# Patient Record
Sex: MALE | Race: WHITE | NOT HISPANIC OR LATINO | Employment: OTHER | URBAN - METROPOLITAN AREA
[De-identification: names, ages, dates, MRNs, and addresses within clinical notes are randomized per-mention and may not be internally consistent; named-entity substitution may affect disease eponyms.]

---

## 2017-01-12 ENCOUNTER — GENERIC CONVERSION - ENCOUNTER (OUTPATIENT)
Dept: OTHER | Facility: OTHER | Age: 72
End: 2017-01-12

## 2017-02-02 ENCOUNTER — GENERIC CONVERSION - ENCOUNTER (OUTPATIENT)
Dept: OTHER | Facility: OTHER | Age: 72
End: 2017-02-02

## 2017-03-23 ENCOUNTER — GENERIC CONVERSION - ENCOUNTER (OUTPATIENT)
Dept: OTHER | Facility: OTHER | Age: 72
End: 2017-03-23

## 2017-04-13 ENCOUNTER — GENERIC CONVERSION - ENCOUNTER (OUTPATIENT)
Dept: OTHER | Facility: OTHER | Age: 72
End: 2017-04-13

## 2017-05-04 ENCOUNTER — GENERIC CONVERSION - ENCOUNTER (OUTPATIENT)
Dept: OTHER | Facility: OTHER | Age: 72
End: 2017-05-04

## 2017-05-08 ENCOUNTER — GENERIC CONVERSION - ENCOUNTER (OUTPATIENT)
Dept: OTHER | Facility: OTHER | Age: 72
End: 2017-05-08

## 2017-06-06 ENCOUNTER — ALLSCRIPTS OFFICE VISIT (OUTPATIENT)
Dept: OTHER | Facility: OTHER | Age: 72
End: 2017-06-06

## 2017-07-12 ENCOUNTER — GENERIC CONVERSION - ENCOUNTER (OUTPATIENT)
Dept: OTHER | Facility: OTHER | Age: 72
End: 2017-07-12

## 2017-07-12 LAB — HM COLONOSCOPY: NORMAL

## 2017-08-03 ENCOUNTER — GENERIC CONVERSION - ENCOUNTER (OUTPATIENT)
Dept: OTHER | Facility: OTHER | Age: 72
End: 2017-08-03

## 2017-09-06 ENCOUNTER — GENERIC CONVERSION - ENCOUNTER (OUTPATIENT)
Dept: OTHER | Facility: OTHER | Age: 72
End: 2017-09-06

## 2017-11-24 ENCOUNTER — GENERIC CONVERSION - ENCOUNTER (OUTPATIENT)
Dept: OTHER | Facility: OTHER | Age: 72
End: 2017-11-24

## 2017-12-04 ENCOUNTER — ALLSCRIPTS OFFICE VISIT (OUTPATIENT)
Dept: OTHER | Facility: OTHER | Age: 72
End: 2017-12-04

## 2017-12-11 NOTE — PROGRESS NOTES
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)   3  Prostate cancer (185) (C61)   4  Acute respiratory infection (519 8) (J22)   5  Erectile dysfunction of non-organic origin (302 72) (F52 21)   6  Flu vaccine need (V04 81) (Z23)    Plan  Acute respiratory infection    · Amoxicillin 500 MG Oral Capsule; Take 2 capsules twice a day  Erectile dysfunction of non-organic origin    · Viagra 100 MG Oral Tablet; Take 1/2 or one tablet an hour before activity  Flu vaccine need    · Fluzone High-Dose 0 5 ML Intramuscular Suspension Prefilled Syringe    Discussion/Summary  Possible side effects of new medications were reviewed with the patient/guardian today  The treatment plan was reviewed with the patient/guardian  The patient/guardian understands and agrees with the treatment plan     Self Referrals: No      Chief Complaint  Patient is here today for follow up of chronic conditions described in HPI  History of Present Illness  6 month followupsore throat which went into the head since 11/21/17  No chest congestioncolonoscopy in June and doing well with diet change and on Linzess and Miralax      Review of Systems   Constitutional: not feeling tired  Cardiovascular: no chest pain-- and-- no palpitations  Gastrointestinal: no abdominal pain,-- no nausea-- and-- no diarrhea  Genitourinary: no dysuria  Musculoskeletal: no arthralgias  Psychiatric: anxiety-- and-- depression  Active Problems  1  Posadas esophagus (530 85) (K22 70)   2  Blood pressure elevated without history of HTN (796 2) (R03 0)   3  Constipation (564 00) (K59 00)   4  Degeneration, intervertebral disc, thoracic (722 51) (M51 34)   5  Erectile dysfunction of non-organic origin (302 72) (F52 21)   6  Esophagitis, reflux (530 11) (K21 0)   7  Hyperlipidemia (272 4) (E78 5)   8  Lumbar radiculopathy (724 4) (M54 16)   9   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)   10  Osteopenia (733 90) (M85 80)   11  Phlebectasis (228 01)   12  Prostate cancer (185) (C61)   13  Urinary frequency (788 41) (R35 0)   14  Venous stasis dermatitis of left lower extremity (454 1) (I87 2)    Past Medical History  1  Denied: History of Alcohol abuse   2  Denied: History of substance abuse    The active problems and past medical history were reviewed and updated today  Family History  Mother    1  Family history of Hypertension (V17 49)  Family History    2  Denied: Family history of Alcohol abuse   3  Denied: Family history of substance abuse   4  Denied: Family history of Mental health problem    The family history was reviewed and updated today  Social History     · Denied: History of Always uses seat belt   · Former smoker (V15 82) (I51 875)   · Marital History - Single   · Never Drank Alcohol  The social history was reviewed and updated today  The social history was reviewed and is unchanged  Current Meds   1  Casodex 50 MG Oral Tablet; TAKE 1 TABLET DAILY AT DINNER; Therapy: 81Cxs0967 to Recorded   2  Famotidine 20 MG Oral Tablet; take 1 tablet by mouth once daily for ACID REFLUX; Therapy: 28MDM5262 to (Evaluate:06Mar2017) Recorded   3  FLUoxetine HCl - 20 MG Oral Capsule; TAKE (1) CAPSULE DAILY; Therapy: 27Nov2012 to Recorded   4  Imipramine HCl - 50 MG Oral Tablet; TAKE 1 TABLET DAILY AS DIRECTED; Therapy: 27Nov2012 to Recorded   5  Perphenazine 4 MG Oral Tablet; TAKE 1 TABLET AT BEDTIME; Therapy: 41Dyr5645 to Recorded   6  TraZODone HCl - 150 MG Oral Tablet; TAKE 1 TABLET AT BEDTIME; Therapy: 71HII9690 to (Evaluate:47Iuh1123) Recorded   7  Viagra 100 MG Oral Tablet; TAKE AS DIRECTED; Therapy: 96HNJ7658 to (Last Rx:49Mny9181)  Requested for: 37Cew9376 Ordered   8  Zolpidem Tartrate 10 MG Oral Tablet; TAKE 1/2 TO 1 TABLET AT BEDTIME AS NEEDED FOR SLEEP;  Therapy: 62CFL4908 to (Last Rx:27Nov2012) Ordered    The medication list was reviewed and updated today  Allergies  1  No Known Drug Allergies    Vitals  Vital Signs    Recorded: 62KNU6394 10:22AM   Temperature 98 5 F, Tympanic   Heart Rate 100, L Radial   Pulse Quality Regular, L Radial   Systolic 627, LUE, Sitting   Diastolic 76, LUE, Sitting   Height 5 ft 9 in   Weight 154 lb 9 6 oz   BMI Calculated 22 83   BSA Calculated 1 85       Physical Exam   Constitutional  General appearance: No acute distress, well appearing and well nourished  Ears, Nose, Mouth, and Throat  Otoscopic examination: Tympanic membrance translucent with normal light reflex  Canals patent without erythema  Nasal mucosa, septum, and turbinates: Abnormal  -- congested nares  Oropharynx: Normal with no erythema, edema, exudate or lesions  Pulmonary  Respiratory effort: No increased work of breathing or signs of respiratory distress  Auscultation of lungs: Abnormal  -- scattered rhonchi  Cardiovascular  Auscultation of heart: Normal rate and rhythm, normal S1 and S2, without murmurs  Examination of extremities for edema and/or varicosities: Normal    Carotid pulses: Normal    Musculoskeletal  Gait and station: Normal    Skin  Skin and subcutaneous tissue: Normal without rashes or lesions     Psychiatric  Orientation to person, place and time: Normal    Mood and affect: Normal          Future Appointments    Date/Time Provider Specialty Site   06/04/2018 09:40 AM Jarod Somers  [facility] MD       Signatures   Electronically signed by : Jeremiah Dockery MD; Dec 10 2017  1:38PM EST                       (Author)

## 2018-01-11 NOTE — PROGRESS NOTES
Assessment    1  Encounter for preventive health examination (V70 0) (Z00 00)   2  Hyperlipidemia (272 4) (E78 5)   3  Elevated prostate specific antigen (PSA) (790 93) (R97 2)   4  Osteopenia (733 90) (M85 80)   5  Venous stasis dermatitis of left lower extremity (454 1) (I83 12)   6  Elevated blood pressure reading without diagnosis of hypertension (796 2) (R03 0)    Plan  Health Maintenance    · *VB - Fall Risk Assessment  (Dx V80 09 Screen for Neurologic Disorder);  Status:Complete;   Done: 86JNP3535   · *VB-Urinary Incontinence Screen (Dx V81 6 Screen for UI); Status:Complete;   Done:  66AEQ6351   · Eat a low fat and low cholesterol diet ; Status:Complete;   Done: 81QSI6226   · There ways to avoid falling ; Status:Complete;   Done: 02OAL9373   · These are things you can do to prevent falls in and around the home ; Status:Complete;    Done: 34EIQ9187  Hyperlipidemia    · (1) CBC/PLT/DIFF; Status:Hold For - Exact Date; Requested for:Approx 71ZKE8767;    · (1) COMPREHENSIVE METABOLIC PANEL; Status:Hold For - Exact Date; Requested  for:Approx 67IXF3223;    · (1) LIPID PANEL, FASTING; Status:Hold For - Exact Date; Requested for:Approx  35YXV5737;    · (1) TSH WITH FT4 REFLEX; Status:Hold For - Exact Date; Requested for:Approx  30LJF6775;   Osteopenia    · (1) VITAMIN D 25-HYDROXY; Status:Hold For - Exact Date; Requested for:Approx  43KTK0565;   Venous stasis dermatitis of left lower extremity    · Follow-up visit in 3 months Evaluation and Treatment  Follow-up  Status: Complete   Done: 71ERA7280    Discussion/Summary  Impression: Subsequent Annual Wellness Visit, with preventive exam as well as age and risk appropriate counseling completed  Cardiovascular screening and counseling: screening is current  Diabetes screening and counseling: screening is current  Colorectal cancer screening and counseling: screening is current  Prostate cancer screening and counseling: screening is current     Osteoporosis screening and counseling: screening is current  Abdominal aortic aneurysm screening and counseling: screening not indicated  Glaucoma screening and counseling: screening is current  HIV screening and counseling: screening not indicated  Immunizations: influenza vaccine is due today, the lifetime pneumococcal vaccine has been completed, hepatitis B vaccination series is not indicated at this time due to the patient's low risk of sofia the disease, Zostavax vaccination up to date, the patient declines the Td vaccine and the patient declines the Tdap vaccine  Advance Directive Planning: not complete, paperwork and instructions were given to the patient, he was encouraged to follow-up with me to discuss his questions and/or decisions  Advice and education were given regarding fall risk reduction  He was referred to none  Medical Equipment/Suppliers: none  Patient Discussion: plan discussed with the patient, follow-up visit needed in 6 months  Chief Complaint  HM      History of Present Illness  HPI: Seeing Dr Jones Kennedy for abnormal prostate and had biopsy end of May  To see him today for followup  Follows with [facility] 3 times a month  Has open area on calf that he was scratching at  Welcome to [employer] and Wellness Visits: The patient is being seen for the subsequent annual wellness visit  Medicare Screening and Risk Factors   Hospitalizations: no previous hospitalizations  Once per lifetime medicare screening tests: ECG has not been done and AAA screening US has not yet been done  Medicare Screening Tests Risk Questions   Abdominal aortic aneurysm risk assessment: none indicated  Osteoporosis risk assessment: , over 48years of age and past medical history of fracture(s), but none indicated  HIV risk assessment: none indicated  Drug and Alcohol Use:  The patient is a former cigarette smoker, quit smoking 1970's and has never used smokeless tobacco  The patient reports never drinking alcohol  He has never used illicit drugs  Diet and Physical Activity: Current diet includes well balanced meals, 2 servings of fruit per day, 1 servings of vegetables per day, 1 servings of meat per day, 1 servings of whole grains per day, 3 servings of dairy products per day and 3 cups of tea per day  He exercises infrequently  Exercise: walking  Mood Disorder and Cognitive Impairment Screening: PHQ-9 Depression Scale   Depression screening  no significant symptoms  He reports feeling down, depressed, or hopeless over the past two weeks  He reports feeling little interest or pleasure in doing things over the past two weeks  Further Evaluation: Patients mother recently passed away  Cognitive impairment screening: denies difficulty learning/retaining new information, denies difficulty handling complex tasks, denies difficulty with reasoning, denies difficulty with spatial ability and orientation, denies difficulty with language and denies difficulty with behavior  Functional Ability/Level of Safety: Hearing is normal bilaterally, normal in the right ear, normal in the left ear and a hearing aid is not used  The patient is currently able to do activities of daily living with limitations, unable to participate in social activities and able to drive with limitations, but able to do instrumental activities of daily living without limitations  Activities of daily living details: does not need help using the phone, no transportation help needed, does not need help shopping, no meal preparation help needed, does not need help doing housework, does not need help doing laundry, does not need help managing medications and does not need help managing money  Fall risk factors:  antidepressant use and follows with [facility], but no deconditioning and no postural hypotension  Home safety risk factors:  household clutter, but no unfamiliar surroundings, no loose rugs and no poor household lighting  Advance Directives: Advance directives: no living will, no durable power of  for health care directives and no advance directives  end of life decisions were not reviewed with the patient  Co-Managers and Medical Equipment/Suppliers: See Patient Care Team   Reviewed Updated [facility]:   Last Medicare Wellness Visit Information was reviewed, patient interviewed and updates made to the record today  Preventive Quality Program 65 and Older: Falls Risk: The patient fell 0 times in the past 12 months  Associated symptoms:  No associated symptoms are reported  The patient currently has no urinary incontinence symptoms  Patient Care Team    Care Team Member Role Specialty Office Number   Lawsonlucas Humberto DIXON  Family Medicine [phone]   Becky Ferrara MD  Urology [phone]     Review of Systems    Constitutional: no malaise  ENT: sore throat, nasal congestion and nasal discharge  Cardiovascular: no chest pain  Respiratory: no cough and no dry cough  Over the past 2 weeks, how often have you been bothered by the following problems? 1 ) Little interest or pleasure in doing things? Half the days or more  2 ) Feeling down, depressed or hopeless? Half the days or more  3 ) Trouble falling asleep or sleeping too much? Not at all    4 ) Feeling tired or having little energy? Not at all    5 ) Poor appetite or overeating? Not at all    6 ) Feeling bad about yourself, or that you are a failure, or have let yourself or your family down? Several days  7 ) Trouble concentrating on things, such as reading a newspaper or watching television? Half the days or more  8 ) Moving or speaking so slowly that other people could have noticed, or the opposite, moving or speaking faster than usual? Not at all  How difficult have these problems made it for you to do your work, take care of things at home, or get along with people? Somewhat difficult  Score 7      Active Problems    1  Ankle edema (782 3) (M25 473)   2  Posadas esophagus (530 85) (K22 70)   3  Constipation (564 00) (K59 00)   4  Degeneration, intervertebral disc, thoracic (722 51) (M51 34)   5  Elevated blood pressure reading without diagnosis of hypertension (796 2) (R03 0)   6  Elevated prostate specific antigen (PSA) (790 93) (R97 2)   7  Encounter for screening colonoscopy (V76 51) (Z12 11)   8  Erectile dysfunction of non-organic origin (302 72) (F52 21)   9  Esophagitis, reflux (530 11) (K21 0)   10  Flu vaccine need (V04 81) (Z23)   11  Hyperlipidemia (272 4) (E78 5)   12  Lumbar radiculopathy (724 4) (M54 16)   13  Need for vaccination with 13-polyvalent pneumococcal conjugate vaccine (V03 82) (Z23)   14  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)   15  Osteopenia (733 90) (M85 80)   16  Phlebectasis (228 01)   17  Special screening examination for neoplasm of prostate (V76 44) (Z12 5)    Family History  Mother    · Family history of Hypertension (V17 49)    The family history was reviewed and updated today  Social History    · Marital History - Single   · Never A Smoker   · Never Drank Alcohol   · Non-smoker (V49 89) (Z78 9)  The social history was reviewed and updated today  The social history was reviewed and is unchanged  Current Meds   1  Alendronate Sodium 70 MG Oral Tablet; TAKE 1 TABLET ONCE A WEEK (THE SAME   DAY OF WEEK) ON EMPTY STOMACH-BIGGWEQI40 -60MIN BEFORE LYING DOWN;   Therapy: 41SYS3874 to (Evaluate:97Siy6232)  Requested for: 22BSO3318; Last   Rx:30Nov2015 Ordered   2  FLUoxetine HCl - 20 MG Oral Capsule; TAKE (1) CAPSULE DAILY; Therapy: 27Nov2012 to Recorded   3  Imipramine HCl - 50 MG Oral Tablet; TAKE 1 TABLET DAILY AS DIRECTED; Therapy: 27Nov2012 to Recorded   4  Perphenazine 4 MG Oral Tablet; TAKE 1 TABLET AT BEDTIME; Therapy: 48Tgx2209 to Recorded   5  TraZODone HCl - 150 MG Oral Tablet; TAKE 1 TABLET AT BEDTIME;    Therapy: 94KTO8749 to (Evaluate:84Uwg1863) Recorded 6  Viagra 100 MG Oral Tablet; TAKE AS DIRECTED; Therapy: 89IIE1170 to (Last Rx:02Dec2013)  Requested for: 92Xxq1790 Ordered   7  Zolpidem Tartrate 10 MG Oral Tablet; TAKE 1/2 TO 1 TABLET AT BEDTIME AS NEEDED   FOR SLEEP; Therapy: 32SCK9420 to (Last Rx:27Nov2012) Ordered    The medication list was reviewed and updated today  Allergies    1  No Known Drug Allergies    Immunizations   1 2    Fluzone High-Dose 0 5 ML Intramuscular Suspension Prefilled Syringe  66QMT9427     Influenza  00VEF5540 34LBQ5673    PNEUMO (POLY)  04HSN3951     Prevnar 13 Intramuscular Suspension  43NGD7970     Zoster  60OPO5590      Vitals  Signs [Data Includes: Current Encounter]    Systolic: 055  Diastolic: 88   Temperature: 97 5 F, Tympanic  Heart Rate: 72, L Radial  Pulse Quality: Regular  Systolic: 524, LUE, Sitting  Diastolic: 80, LUE, Sitting  Height: 5 ft 9 in  Weight: 157 lb 6 4 oz  BMI Calculated: 23 24  BSA Calculated: 1 86    Physical Exam    Constitutional   General appearance: No acute distress, well appearing and well nourished  Ears, Nose, Mouth, and Throat   External inspection of ears and nose: Normal     Otoscopic examination: Tympanic membranes translucent with normal light reflex  Canals patent without erythema  Hearing: Normal     Nasal mucosa, septum, and turbinates: Normal without edema or erythema  Neck   Neck: Supple, symmetric, trachea midline, no masses  Thyroid: Normal, no thyromegaly  Pulmonary   Respiratory effort: No increased work of breathing or signs of respiratory distress  Auscultation of lungs: Clear to auscultation  Cardiovascular   Auscultation of heart: Normal rate and rhythm, normal S1 and S2, no murmurs  Peripheral vascular exam: Normal     Examination of extremities for edema and/or varicosities: Normal     Lymphatic   Palpation of lymph nodes in neck: No lymphadenopathy  Skin   Skin and subcutaneous tissue: Normal without rashes or lesions      Psychiatric Judgment and insight: Normal     Orientation to person, place and time: Normal     Recent and remote memory: Intact  Mood and affect: Normal        Procedure    Procedure: Visual Acuity Test    Indication: routine screening  Inforrmation supplied by a Snellen chart     Results: 20/30 in the right eye without corrective device, 20/40 in the left eye without corrective device      Future Appointments    Date/Time Provider Specialty Site   09/07/2016 09:40 AM Lu Matos MD Family Medicine Ruben Wild MD   11/30/2016 09:00 AM Lu Matos MD [address] [place] [address]     Signatures   Electronically signed by : Edmund Erwin MD; Jun 23 2016  8:41PM EST                       (Author)

## 2018-01-13 VITALS
TEMPERATURE: 99 F | DIASTOLIC BLOOD PRESSURE: 82 MMHG | HEART RATE: 100 BPM | WEIGHT: 155 LBS | BODY MASS INDEX: 22.96 KG/M2 | HEIGHT: 69 IN | SYSTOLIC BLOOD PRESSURE: 164 MMHG

## 2018-01-16 NOTE — PROGRESS NOTES
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)   4  Prostate cancer (185) (C61)   5  Constipation (564 00) (K59 00)    Plan  Health Maintenance    · *VB - Fall Risk Assessment  (Dx Z13 89 Screen for Neurologic Disorder);  Status:Complete;   Done: 26LAW1131 07:43AM   · *VB - Urinary Incontinence Screen (Dx Z13 89 Screen for UI); Status:Complete;   Done:  83IQT8945 07:43AM   · Eat a low fat and low cholesterol diet ; Status:Complete;   Done: 49TQJ2067   · Stretch and warm up your muscles during the first 10 minutes , then cool down your  muscles for the last 10 minutes of exercise ; Status:Complete;   Done: 34MJA7528   · The plan of care for urinary incontinence is detailed in the plan and/or discussion section  of today's note ; Status:Complete;   Done: 72VFY9330   · These are things you can do to prevent falls in and around the home ; Status:Complete;    Done: 70VFN8721   · We recommend that you create an advance directive ; Status:Complete;   Done:  44SFP2933   · Follow-up visit in 1 year Evaluation and Treatment  Follow-up  Status: Complete  Done:  78NZH0902  Hyperlipidemia    · (1) CBC/PLT/DIFF; Status:Hold For - Exact Date; Requested for:Approx 92VCZ0062;    · (1) COMPREHENSIVE METABOLIC PANEL; Status:Hold For - Exact Date; Requested  for:Approx [id number];    · (1) LIPID PANEL, FASTING; Status:Hold For - Exact Date; Requested for:Approx  68ACP2760;    · (1) TSH WITH FT4 REFLEX; Status:Hold For - Exact Date; Requested for:Approx  [id number];    · Follow-up visit in 6 months Evaluation and Treatment  Follow-up  Status: Hold For -  Scheduling  Requested for: 64IYS0020    Discussion/Summary    Progressing well through prostate cancer management  Return in 6 months with regular fasting labs         Impression: Subsequent Annual Wellness Visit, with preventive exam as well as age and risk appropriate counseling completed  Cardiovascular screening and counseling: screening is current  Diabetes screening and counseling: screening is current  Colorectal cancer screening and counseling: screening is current  Prostate cancer screening and counseling: screening is current  Osteoporosis screening and counseling: screening is current  Abdominal aortic aneurysm screening and counseling: screening not indicated  Glaucoma screening and counseling: screening is current  HIV screening and counseling: screening not indicated  Immunizations: influenza vaccine is due today, the lifetime pneumococcal vaccine has been completed, hepatitis B vaccination series is not indicated at this time due to the patient's low risk of sofia the disease, Zostavax vaccination up to date, the patient declines the Td vaccine and the patient declines the Tdap vaccine  Advance Directive Planning: not complete, paperwork and instructions were given to the patient, he was encouraged to follow-up with me to discuss his questions and/or decisions  Advice and education were given regarding fall risk reduction  He was referred to none  Medical Equipment/Suppliers: none  Patient Discussion: plan discussed with the patient, follow-up visit needed in 6 months  Possible side effects of new medications were reviewed with the patient/guardian today  The treatment plan was reviewed with the patient/guardian  The patient/guardian understands and agrees with the treatment plan         Self Referrals: No      Chief Complaint  HM      Advance Directives  Advance Directive [facility]:   YES - Patient has an advance health care directive  The patient has a living will located  in patient's home  Capacity/Competence:  This patient has full decision making capacity for discussion of advance care planning and This patient has full decision making competency for discussion of advance care planning  History of Present Illness  HPI: completed chemotherapy for prostate cancer in May and is in process of hair regrowth  To have PORT removal next week  Seeing Dr Arnaldo Lopez for abdominal issues and constipation, using Linzess and MiraLax       Welcome to [employer] and Wellness Visits: The patient is being seen for the subsequent annual wellness visit  Medicare Screening and Risk Factors   Hospitalizations: no previous hospitalizations  Once per lifetime medicare screening tests: ECG has not been done and AAA screening US has not yet been done  Medicare Screening Tests Risk Questions   Abdominal aortic aneurysm risk assessment: none indicated  Osteoporosis risk assessment: , over 48years of age and past medical history of fracture(s), but none indicated  HIV risk assessment: none indicated  Drug and Alcohol Use: The patient is a former cigarette smoker, quit smoking 1970's and has never used smokeless tobacco  The patient reports never drinking alcohol  He has never used illicit drugs  Diet and Physical Activity: Current diet includes well balanced meals, 2 servings of fruit per day, 1 servings of vegetables per day, 1 servings of meat per day, 1 servings of whole grains per day, 3 servings of dairy products per day and 3 cups of tea per day  He exercises infrequently  Exercise: walking  Mood Disorder and Cognitive Impairment Screening: PHQ-9 Depression Scale   Depression screening  no significant symptoms  He reports feeling down, depressed, or hopeless over the past two weeks  He reports feeling little interest or pleasure in doing things over the past two weeks  Further Evaluation: Patients mother recently passed away     Cognitive impairment screening: denies difficulty learning/retaining new information, denies difficulty handling complex tasks, denies difficulty with reasoning, denies difficulty with spatial ability and orientation, denies difficulty with language and denies difficulty with behavior  Functional Ability/Level of Safety: Hearing is normal bilaterally, normal in the right ear, normal in the left ear and a hearing aid is not used  The patient is currently able to do activities of daily living with limitations, unable to participate in social activities and able to drive with limitations, but able to do instrumental activities of daily living without limitations  Activities of daily living details: does not need help using the phone, no transportation help needed, does not need help shopping, no meal preparation help needed, does not need help doing housework, does not need help doing laundry, does not need help managing medications and does not need help managing money  Fall risk factors:  antidepressant use and follows with [facility], but no deconditioning and no postural hypotension  Home safety risk factors:  household clutter, but no unfamiliar surroundings, no loose rugs and no poor household lighting  Advance Directives: Advance directives: no living will, no durable power of  for health care directives and no advance directives  end of life decisions were not reviewed with the patient  Co-Managers and Medical Equipment/Suppliers: See Patient Care Team       Reviewed Updated Gordo Galvez:   Last Medicare Wellness Visit Information was reviewed, patient interviewed and updates made to the record today  Preventive Quality Program 65 and Older: Falls Risk: The patient fell 0 times in the past 12 months  Associated symptoms:  No associated symptoms are reported  The patient currently has no urinary incontinence symptoms  Patient Care Team    Care Team Member Role Specialty Office Number   Brian Cohen MD  Family Medicine [phone]   Meme Navarrete MD  Urology [phone]     Review of Systems    Constitutional: no malaise  ENT: sore throat, nasal congestion and nasal discharge  Cardiovascular: no chest pain     Respiratory: no cough and no dry cough  Over the past 2 weeks, how often have you been bothered by the following problems? 1 ) Little interest or pleasure in doing things? Half the days or more  2 ) Feeling down, depressed or hopeless? Half the days or more  3 ) Trouble falling asleep or sleeping too much? Not at all    4 ) Feeling tired or having little energy? Not at all    5 ) Poor appetite or overeating? Not at all    6 ) Feeling bad about yourself, or that you are a failure, or have let yourself or your family down? Several days  7 ) Trouble concentrating on things, such as reading a newspaper or watching television? Half the days or more  8 ) Moving or speaking so slowly that other people could have noticed, or the opposite, moving or speaking faster than usual? Not at all  How difficult have these problems made it for you to do your work, take care of things at home, or get along with people? 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)   10  Osteopenia (733 90) (M85 80)   11  Phlebectasis (228 01)   12  Prostate cancer (185) (C61)   13  Urinary frequency (788 41) (R35 0)   14  Venous stasis dermatitis of left lower extremity (454 1) (I87 2)    Past Medical History    · Denied: History of Alcohol abuse   · Denied: History of substance abuse    The active problems and past medical history were reviewed and updated today        Family History  Mother    · Family history of Hypertension (V17 49)    The family history was reviewed and updated today  Social History    · Marital History - Single   · Never Drank Alcohol  The social history was reviewed and updated today  The social history was reviewed and is unchanged  Current Meds   1  Casodex 50 MG Oral Tablet; TAKE 1 TABLET DAILY AT DINNER; Therapy: 52Ifp1373 to Recorded   2  Famotidine 20 MG Oral Tablet; take 1 tablet by mouth once daily for ACID REFLUX; Therapy: 48UXX7337 to (Evaluate:06Mar2017) Recorded   3  FLUoxetine HCl - 20 MG Oral Capsule; TAKE (1) CAPSULE DAILY; Therapy: 27Nov2012 to Recorded   4  Imipramine HCl - 50 MG Oral Tablet; TAKE 1 TABLET DAILY AS DIRECTED; Therapy: 27Nov2012 to Recorded   5  Perphenazine 4 MG Oral Tablet; TAKE 1 TABLET AT BEDTIME; Therapy: 77Duv3322 to Recorded   6  TraZODone HCl - 150 MG Oral Tablet; TAKE 1 TABLET AT BEDTIME; Therapy: 05DHV1662 to (Evaluate:25Feb2013) Recorded   7  Viagra 100 MG Oral Tablet; TAKE AS DIRECTED; Therapy: 84UAM0941 to (Last Rx:35Crw2306)  Requested for: 73Ngi3233 Ordered   8  Zolpidem Tartrate 10 MG Oral Tablet; TAKE 1/2 TO 1 TABLET AT BEDTIME AS NEEDED   FOR SLEEP; Therapy: 62IPZ6329 to (Last Rx:27Nov2012) Ordered    The medication list was reviewed and updated today  Allergies    1  No Known Drug Allergies    Immunizations   1 2 3 4    Influenza  02-Dec-2013 01-Dec-2014 30-Nov-2015 07-Sep-2016    PCV  30-Nov-2015       PPSV  04-Jun-2014       Zoster  29-May-2013        Vitals  Signs    Systolic: 640  Diastolic: 82   Temperature: 99 F, Tympanic  Heart Rate: 100, L Radial  Pulse Quality: Regular, L Radial  Systolic: 618, LUE, Sitting  Diastolic: 88, LUE, Sitting  Height: 5 ft 9 in  Weight: 155 lb   BMI Calculated: 22 89  BSA Calculated: 1 85    Physical Exam    Constitutional   General appearance: No acute distress, well appearing and well nourished      Ears, Nose, Mouth, and Throat   External inspection of ears and nose: Normal     Otoscopic examination: Tympanic membranes translucent with normal light reflex  Canals patent without erythema  Hearing: Normal     Nasal mucosa, septum, and turbinates: Normal without edema or erythema  Neck   Neck: Supple, symmetric, trachea midline, no masses  Thyroid: Normal, no thyromegaly  Pulmonary   Respiratory effort: No increased work of breathing or signs of respiratory distress  Auscultation of lungs: Clear to auscultation  Cardiovascular   Auscultation of heart: Normal rate and rhythm, normal S1 and S2, no murmurs  Peripheral vascular exam: Normal     Examination of extremities for edema and/or varicosities: Normal     Lymphatic   Palpation of lymph nodes in neck: No lymphadenopathy  Skin   Skin and subcutaneous tissue: Normal without rashes or lesions  Psychiatric   Judgment and insight: Normal     Orientation to person, place and time: Normal     Recent and remote memory: Intact  Mood and affect: Normal        Results/Data  *VB - Fall Risk Assessment  (Dx Z13 89 Screen for Neurologic Disorder) 60SXR5681 07:43AM [employer]     Test Name Result Flag Reference   Falls Risk      No falls in the past year     *VB - Urinary Incontinence Screen (Dx Z13 89 Screen for UI) 07KII3698 07:43AM [employer]     Test Name Result Flag Reference   Urinary Incontinence Assessment 55GMB6638         Procedure    Procedure: Visual Acuity Test    Indication: routine screening  Inforrmation supplied by a Snellen chart     Results: 20/30 in the right eye without corrective device, 20/40 in the left eye without corrective device      Signatures   Electronically signed by : Jose C Aaron MD; Jun 6 2017  9:42AM EST                       (Author)

## 2018-01-22 VITALS
TEMPERATURE: 98.5 F | BODY MASS INDEX: 22.9 KG/M2 | SYSTOLIC BLOOD PRESSURE: 134 MMHG | HEART RATE: 100 BPM | WEIGHT: 154.6 LBS | HEIGHT: 69 IN | DIASTOLIC BLOOD PRESSURE: 76 MMHG

## 2018-04-05 ENCOUNTER — TELEPHONE (OUTPATIENT)
Dept: FAMILY MEDICINE CLINIC | Facility: HOSPITAL | Age: 73
End: 2018-04-05

## 2018-04-05 RX ORDER — LINACLOTIDE 290 UG/1
CAPSULE, GELATIN COATED ORAL
COMMUNITY
Start: 2018-02-27 | End: 2019-02-20 | Stop reason: SDUPTHER

## 2018-04-05 RX ORDER — ZOLPIDEM TARTRATE 10 MG/1
TABLET ORAL
COMMUNITY
Start: 2012-11-27

## 2018-04-05 RX ORDER — SILDENAFIL 100 MG/1
TABLET, FILM COATED ORAL
COMMUNITY
Start: 2013-01-02 | End: 2022-02-07 | Stop reason: ALTCHOICE

## 2018-04-05 RX ORDER — PERPHENAZINE 4 MG/1
4 TABLET, FILM COATED ORAL
Refills: 4 | COMMUNITY
Start: 2018-03-20

## 2018-04-05 RX ORDER — IMIPRAMINE HCL 50 MG
50 TABLET ORAL
Refills: 4 | COMMUNITY
Start: 2018-03-20

## 2018-04-05 RX ORDER — FAMOTIDINE 20 MG/1
TABLET, FILM COATED ORAL
COMMUNITY
Start: 2016-09-07 | End: 2022-02-07 | Stop reason: ALTCHOICE

## 2018-04-05 RX ORDER — TRAZODONE HYDROCHLORIDE 150 MG/1
150 TABLET ORAL
Refills: 4 | COMMUNITY
Start: 2018-03-20

## 2018-04-05 RX ORDER — BICALUTAMIDE 50 MG/1
1 TABLET ORAL DAILY
COMMUNITY
Start: 2016-09-07 | End: 2021-12-12 | Stop reason: ALTCHOICE

## 2018-04-05 RX ORDER — FLUOXETINE HYDROCHLORIDE 20 MG/1
20 CAPSULE ORAL DAILY
Refills: 4 | COMMUNITY
Start: 2018-03-20

## 2018-04-05 NOTE — TELEPHONE ENCOUNTER
Patient called from [address] [place] [address]  The nurse at that office told him to call and sched an appointment with Dr Angie Sanders for tomorrow due to his high Bp, 231/136  He was advised he should be seen today, the nurse told him tomorrow would be fine   Dr Berto Torre felt he should go to the ER

## 2018-04-06 ENCOUNTER — OFFICE VISIT (OUTPATIENT)
Dept: FAMILY MEDICINE CLINIC | Facility: HOSPITAL | Age: 73
End: 2018-04-06
Payer: MEDICARE

## 2018-04-06 VITALS
HEIGHT: 68 IN | WEIGHT: 149.6 LBS | BODY MASS INDEX: 22.67 KG/M2 | DIASTOLIC BLOOD PRESSURE: 62 MMHG | TEMPERATURE: 97.6 F | HEART RATE: 84 BPM | SYSTOLIC BLOOD PRESSURE: 114 MMHG

## 2018-04-06 DIAGNOSIS — I10 ESSENTIAL HYPERTENSION: Primary | ICD-10-CM

## 2018-04-06 DIAGNOSIS — C61 PROSTATE CANCER (HCC): ICD-10-CM

## 2018-04-06 DIAGNOSIS — F60.5 OBSESSIVE COMPULSIVE PERSONALITY DISORDER (HCC): ICD-10-CM

## 2018-04-06 PROCEDURE — 99214 OFFICE O/P EST MOD 30 MIN: CPT | Performed by: FAMILY MEDICINE

## 2018-04-06 RX ORDER — AMLODIPINE BESYLATE 5 MG/1
5 TABLET ORAL DAILY
Refills: 0
Start: 2018-04-06 | End: 2018-05-01 | Stop reason: SDUPTHER

## 2018-04-06 NOTE — PROGRESS NOTES
Assessment/Plan:         Diagnoses and all orders for this visit:    Essential hypertension  Comments:  Accelerated hypertension stabilized with Clonidine  Hopefully can achieve consistent control of BP with samples of Amlodipine 5 mg daily  Try to get a home BP cuff to monitor self  Orders:  -     AmLODIPine (NORVASC) 5 mg tablet; Take 1 tablet (5 mg total) by mouth daily    Prostate cancer ([facility] [address] )  Comments:  Stable, Lupron completed  Follow up with Dr Gagandeep Martin compulsive personality disorder  Comments:  Clinically stable on current medication combination          Subjective:      Patient ID: Eliseo Cortez is a 71 y o  male  Had event of very elevated BP in the office of Dr Patricia Stanley for Lupron injection  BP up to 198/111 and after injection was up to 231/136 with pulse 106  Sent to [facility] and was given Clonidine 0 1 mg twice during the stay  Feeling well this morning  No recent illness or injury  His Lupron therapy is completed as of yesterday  Had EKG done which was reported to be normal         The following portions of the patient's history were reviewed and updated as appropriate: allergies, current medications, past family history, past medical history, past social history, past surgical history and problem list     Review of Systems   Constitutional: Negative for diaphoresis, fatigue and unexpected weight change  HENT: Negative for congestion, facial swelling, sinus pressure and sore throat  Eyes: Negative for pain  Respiratory: Negative for cough, chest tightness, shortness of breath and wheezing  Cardiovascular: Negative  Gastrointestinal: Negative for abdominal pain  Genitourinary: Positive for frequency  Negative for difficulty urinating  Musculoskeletal: Negative for arthralgias  Neurological: Negative for dizziness, weakness, numbness and headaches  Hematological: Negative      Psychiatric/Behavioral: Negative for confusion, decreased concentration and sleep disturbance  The patient is not nervous/anxious  Objective:      /62 (BP Location: Left arm, Patient Position: Sitting, Cuff Size: Standard)   Pulse 84   Temp 97 6 °F (36 4 °C) (Tympanic)   Ht 5' 8" (1 727 m)   Wt 67 9 kg (149 lb 9 6 oz)   BMI 22 75 kg/m²          Physical Exam   Constitutional: He is oriented to person, place, and time  He appears well-developed and well-nourished  Cardiovascular: Normal rate, regular rhythm, normal heart sounds and intact distal pulses  Neurological: He is oriented to person, place, and time  Psychiatric: He has a normal mood and affect  His behavior is normal  Judgment and thought content normal    Nursing note and vitals reviewed

## 2018-05-01 ENCOUNTER — OFFICE VISIT (OUTPATIENT)
Dept: FAMILY MEDICINE CLINIC | Facility: HOSPITAL | Age: 73
End: 2018-05-01
Payer: MEDICARE

## 2018-05-01 VITALS
TEMPERATURE: 98.9 F | SYSTOLIC BLOOD PRESSURE: 150 MMHG | DIASTOLIC BLOOD PRESSURE: 88 MMHG | HEIGHT: 68 IN | BODY MASS INDEX: 23.46 KG/M2 | HEART RATE: 88 BPM | WEIGHT: 154.8 LBS

## 2018-05-01 DIAGNOSIS — I10 ESSENTIAL HYPERTENSION: Primary | ICD-10-CM

## 2018-05-01 PROCEDURE — 99213 OFFICE O/P EST LOW 20 MIN: CPT | Performed by: NURSE PRACTITIONER

## 2018-05-01 RX ORDER — AMLODIPINE BESYLATE 5 MG/1
5 TABLET ORAL DAILY
Refills: 0
Start: 2018-05-01 | End: 2018-05-28 | Stop reason: SDUPTHER

## 2018-05-01 NOTE — ASSESSMENT & PLAN NOTE
BP elevated initially but improving to 148/86 through visit  Will continue same amlodipine and return in 1 month for routine OV scheduled w/Dr Alma Delia Gross  May need titration if not further improved by that time  Advise he consider purchase of home BP cuff (Omron), monitor few times/week and bring readings to next appt

## 2018-05-01 NOTE — PROGRESS NOTES
Assessment/Plan:    Essential hypertension  BP elevated initially but improving to 148/86 through visit  Will continue same amlodipine and return in 1 month for routine OV scheduled w/Dr Jonathan Burkett  May need titration if not further improved by that time  Advise he consider purchase of home BP cuff (Omron), monitor few times/week and bring readings to next appt  Diagnoses and all orders for this visit:    Essential hypertension  Comments:  Accelerated hypertension stabilized with Clonidine  Hopefully can achieve consistent control of BP with samples of Amlodipine 5 mg daily  Try to get a home BP  Orders:  -     amLODIPine (NORVASC) 5 mg tablet; Take 1 tablet (5 mg total) by mouth daily          Subjective:      Patient ID: Sallie  is a 71 y o  male here for BP follow up  Was started on amlodipine about a month ago after being in [facility] ER night before for high BP  BP was high that day at time of chemo infusion  On April 12th he had felt a fluttering in heart when he was reading in bed  Was on/off for a few hours  Did not feel dizzy, lightheaded or SOB at the time  Hasn't happened since  Does not follow BP at home  The following portions of the patient's history were reviewed and updated as appropriate: allergies, current medications, past medical history, past social history and problem list     Review of Systems   Respiratory: Negative for shortness of breath  Cardiovascular: Negative for chest pain and palpitations  Neurological: Negative for dizziness and light-headedness  Objective:      /88 (Patient Position: Sitting, Cuff Size: Standard)   Pulse 88   Temp 98 9 °F (37 2 °C) (Tympanic)   Ht 5' 8" (1 727 m)   Wt 70 2 kg (154 lb 12 8 oz)   BMI 23 54 kg/m²          Physical Exam   Constitutional: He appears well-developed and well-nourished  No distress  Eyes: Conjunctivae are normal    Neck: No thyromegaly present     Cardiovascular: Normal rate and regular rhythm  No murmur heard  Pulmonary/Chest: Breath sounds normal  No respiratory distress  Lymphadenopathy:     He has no cervical adenopathy  Vitals reviewed

## 2018-05-28 DIAGNOSIS — I10 ESSENTIAL HYPERTENSION: ICD-10-CM

## 2018-05-28 RX ORDER — AMLODIPINE BESYLATE 5 MG/1
TABLET ORAL
Qty: 30 TABLET | Refills: 0 | Status: SHIPPED | OUTPATIENT
Start: 2018-05-28 | End: 2018-06-04 | Stop reason: SDUPTHER

## 2018-06-04 ENCOUNTER — OFFICE VISIT (OUTPATIENT)
Dept: FAMILY MEDICINE CLINIC | Facility: HOSPITAL | Age: 73
End: 2018-06-04
Payer: MEDICARE

## 2018-06-04 VITALS
SYSTOLIC BLOOD PRESSURE: 132 MMHG | DIASTOLIC BLOOD PRESSURE: 72 MMHG | BODY MASS INDEX: 22.91 KG/M2 | TEMPERATURE: 98.5 F | HEART RATE: 88 BPM | HEIGHT: 68 IN | WEIGHT: 151.2 LBS

## 2018-06-04 DIAGNOSIS — E78.2 MIXED HYPERLIPIDEMIA: ICD-10-CM

## 2018-06-04 DIAGNOSIS — R03.0 BLOOD PRESSURE ELEVATED WITHOUT HISTORY OF HTN: ICD-10-CM

## 2018-06-04 DIAGNOSIS — C61 PROSTATE CANCER (HCC): ICD-10-CM

## 2018-06-04 DIAGNOSIS — Z00.00 MEDICARE ANNUAL WELLNESS VISIT, SUBSEQUENT: Primary | ICD-10-CM

## 2018-06-04 DIAGNOSIS — I10 ESSENTIAL HYPERTENSION: ICD-10-CM

## 2018-06-04 PROCEDURE — G0439 PPPS, SUBSEQ VISIT: HCPCS | Performed by: FAMILY MEDICINE

## 2018-06-04 RX ORDER — AMLODIPINE BESYLATE 5 MG/1
5 TABLET ORAL DAILY
Qty: 30 TABLET | Refills: 5 | Status: SHIPPED | OUTPATIENT
Start: 2018-06-04 | End: 2018-10-11 | Stop reason: SDUPTHER

## 2018-06-04 NOTE — PROGRESS NOTES
Assessment/Plan:         Diagnoses and all orders for this visit:    Medicare annual wellness visit, subsequent    Essential hypertension  Comments:  Accelerated hypertension stabilized with Clonidine  Hopefully can achieve consistent control of BP with samples of Amlodipine 5 mg daily  Try to get a home BP  Orders:  -     amLODIPine (NORVASC) 5 mg tablet; Take 1 tablet (5 mg total) by mouth daily    Blood pressure elevated without history of HTN  Comments:  Excellent BP control on Amlodipine  Good idea to get home BP monitor  Orders:  -     CBC and differential; Future  -     Comprehensive metabolic panel; Future  -     TSH, 3rd generation with T4 reflex; Future    Prostate cancer [facility])  Comments:  Continue follow and PSA management through Dr Shazia Warner    Mixed hyperlipidemia  -     Lipid Panel with Direct LDL reflex; Future          Subjective:      Patient ID: Joie Castle is a 71 y o  male  No new illness or injury    Had very elevated BP in April  Completed Lupron and will see Dr Shazia Warner in October with PSA  Seeing Dr Sapna Pal in September for GI followup  Doing well on Linzess and Miralax        The following portions of the patient's history were reviewed and updated as appropriate: allergies, current medications, past family history, past medical history, past social history, past surgical history and problem list     Review of Systems   Constitutional: Negative  HENT: Negative for congestion and tinnitus  Eyes: Negative for visual disturbance  Respiratory: Negative  Cardiovascular: Negative  Gastrointestinal: Positive for constipation  Endocrine: Negative  Genitourinary: Negative for testicular pain  Musculoskeletal: Negative  Neurological: Negative for dizziness  Hematological: Negative  Psychiatric/Behavioral: Positive for dysphoric mood  The patient is nervous/anxious          AWV Clinical     ISAR:   Previous hospitalizations?:  No       Once in a Lifetime Medicare Screening:   EKG performed?:  Yes    AAA screening performed? (if performed, please add date to Health Maintenance):  No       Medicare Screening Tests and Risk Assessment:   AAA Risk Assessment    Age over 72 (males only):  Yes    Osteoporosis Risk Assessment    :  Yes    Age over 48:  Yes    HIV Risk Assessment    None indicated:  Yes        Drug and Alcohol Use:   Tobacco use    Cigarettes:  former smoker    Smokeless:  never used smokeless tobacco    Tobacco use duration    Tobacco Cessation Readiness    Alcohol use    Alcohol use:  rare use    Alcohol Treatment Readiness   Illicit Drug Use    Drug use:  never        Diet & Exercise:   Diet   What is your diet?:  Regular   How many servings a day of the following:   Fruits and Vegetables:  3-4 Meat:  1-2   Whole Grains:  0 Simple Carbs:  1   Dairy:  3 Soda:  0   Coffee:  0 Tea:  4   Exercise    Do you currently exercise?:  currently not exercising       Cognitive Impairment Screening:   Cognitive Impairment Screening    Do you have difficulty learning or retaining new information?:  No Do you have difficulty handling new tasks?:  No   Do you have difficulty with reasoning?:  No Do you have difficulty with spatial ability and orientation?:  No   Do you have difficulty with language?:  No Do you have difficulty with behavior?:  No       Functional Ability/Level of Safety:   Hearing    Hearing difficulties:  No Bilateral:  normal   Left:  normal Right:  normal   Hearing aid:  No    Hearing Impairment Assessment    Hearing status:  No impairment   Current Activities    Status:  unlimited ADL's, unlimited driving, unlimited IADL's, unlimited social activities   Help needed with the folllowing:    Using the phone:  No Transportation:  No   Shopping:  No Preparing Meals:  No   Doing Housework:  No Doing Laundry:  No   Managing Medications:  No Managing Money:  No   ADL    Feeding:  Independant   Oral hygiene and Facial grooming:  Independant   Bathing: Independant   Upper Body Dressing:  Independant   Lower Body Dressing:  Independant   Toileting:  Independant   Bed Mobility:  Independant   Fall Risk   Have you fallen in the last 12 months?:  Yes    How many times?:  1    Injury History       Home Safety:   Home Safety Risk Factors   Unfamilar with surroundings:  No Uneven floors:  No   Stairs or handrail saftey risk:  No Loose rugs:  No   Household clutter:  No Poor household lighting:  No   No grab bars in bathroom:  No Further evaluation needed:  No       Advanced Directives:   Advanced Directives    Living Will:  No Durable POA for healthcare:  No   Advanced directive:  No    Patient's End of Life Decisions        Urinary Incontinence:       Glaucoma:           Provider Screening     Preventative Screening/Counseling:   Cardiovascular Screening/Counseling:   (Labs Q5 years, EKG optional one-time)   General:  Screening Current           Diabetes Screening/Counseling:   (2 tests/year if Pre-Diabetes or 1 test/year if no Diabetes)   General:  Screening Current           Colorectal Cancer Screening/Counseling:   (FOBT Q1 yr; Flex Sig Q4 yrs or Q10 yrs after Screening Colonoscopy; Screening Colonoscpy Q2 yrs High Risk or Q10 yrs Low Risk; Barium Enema Q2 yrs High Risk or Q4 yrs Low Risk)   General:  Screening Current           Prostate Cancer Screening/Counseling:   (Annual)    General:  Screening Current          Breast Cancer Screening/Counseling:   (Baseline Age 28 - 43; Annual Age 36+)         Cervical Cancer Screening/Counseling:   (Annual for High Risk or Childbearing Age with Abnormal Pap in Last 3 yrs;  Every 2 all others)         Osteoporosis Screening/Counseling:   (Every 2 Yrs if at risk or more if medically necessary)   General:  Screening Current           AAA Screening/Counseling:   (Once per Lifetime with risk factors)     Age over 72 (males only):  Yes    General:  Screening Not Indicated           Glaucoma Screening/Counseling:   (Annual)   General: Screening Current          HIV Screening/Counseling:   (Voluntary; Once annually for high risk OR 3 times for Pregnancy at diagnosis of IUP; 3rd trimester; and at Labor   General:  Screening Not Indicated           Hepatitis C Screening:             Immunizations:   Influenza (annual): Influenza UTD This Year   Pneumococcal (Once in a Lifetime):  Lifetime Vaccine Completed   Hepatitis B Series (low risk patients):  Series Not Indicated   Zostavax (Medicare D Coverage, Pt >66 yo):  Zostavax Vaccine UTD   TD (Non-Medicare Wellness  Visit required):  Patient Declines   Tdap (Non-Medicare Wellness Visit required):  Patient Declines       Other Preventative Couseling (Non-Medicare Wellness Visit Required):   fall prevention education provided       Referrals (Non-Medicare Wellness Visit Required):       Medical Equipment/Suppliers:   none           Objective:      /72 (BP Location: Left arm, Patient Position: Sitting, Cuff Size: Standard)   Pulse 88   Temp 98 5 °F (36 9 °C) (Tympanic)   Ht 5' 8" (1 727 m)   Wt 68 6 kg (151 lb 3 2 oz)   BMI 22 99 kg/m²          Physical Exam   Constitutional: He is oriented to person, place, and time  He appears well-developed and well-nourished  Eyes: Conjunctivae are normal    Cardiovascular: Normal rate, regular rhythm, normal heart sounds and intact distal pulses  Musculoskeletal: Normal range of motion  Neurological: He is alert and oriented to person, place, and time  Skin: No rash noted  Psychiatric: He has a normal mood and affect  His behavior is normal  Judgment and thought content normal    Nursing note and vitals reviewed

## 2018-06-04 NOTE — PATIENT INSTRUCTIONS

## 2018-10-08 ENCOUNTER — TELEPHONE (OUTPATIENT)
Dept: FAMILY MEDICINE CLINIC | Facility: HOSPITAL | Age: 73
End: 2018-10-08

## 2018-10-08 NOTE — TELEPHONE ENCOUNTER
He saw Dr Cherelle Dsouza last week and his blood pressure was 177/91  He was wondering if he should increase his Amlodipine?   PCB

## 2018-10-08 NOTE — TELEPHONE ENCOUNTER
Patient aware  Can we send an updated prescription to [facility] in [place] [address] please?

## 2018-10-11 DIAGNOSIS — I10 ESSENTIAL HYPERTENSION: ICD-10-CM

## 2018-10-11 RX ORDER — AMLODIPINE BESYLATE 5 MG/1
5 TABLET ORAL 2 TIMES DAILY
Qty: 60 TABLET | Refills: 3 | Status: SHIPPED | OUTPATIENT
Start: 2018-10-11 | End: 2019-02-20 | Stop reason: SDUPTHER

## 2018-10-23 ENCOUNTER — TELEPHONE (OUTPATIENT)
Dept: FAMILY MEDICINE CLINIC | Facility: HOSPITAL | Age: 73
End: 2018-10-23

## 2018-10-23 NOTE — TELEPHONE ENCOUNTER
Patient's BP med has been recently increased - he is noticing facial flushing in the evening after he eats dinner - is this related to the increase of meds or might this be elevated BP?   Patient does not have a BP monitor for home use - please call back

## 2018-10-23 NOTE — TELEPHONE ENCOUNTER
Pt called back again, wants Dr Lanny Hayes to know that this has happened 3 times total in the past 2 weeks  Pt feels it is when he is stressed or worried about something  Has no other symptoms, just the feeling of a rush to his head and facial flushing  No dizzyness or anything else  His Amlodipine was recently increased as a result of a telephone call that his BP was up at an appt with Dr Nicolasa Goldstein  He was offered an appointment for tomorrow morning, he says he doesn't feel an appointment is necessary

## 2018-10-24 NOTE — TELEPHONE ENCOUNTER
It sounds like a symptom relating to anxiety and as long as his BP is fairly stable I dont think it is a worrisome issue  He is welcome to make a sooner appointment than the December visit if it is getting more frequent

## 2018-11-02 ENCOUNTER — OFFICE VISIT (OUTPATIENT)
Dept: FAMILY MEDICINE CLINIC | Facility: HOSPITAL | Age: 73
End: 2018-11-02
Payer: MEDICARE

## 2018-11-02 ENCOUNTER — APPOINTMENT (OUTPATIENT)
Dept: LAB | Facility: HOSPITAL | Age: 73
End: 2018-11-02
Payer: MEDICARE

## 2018-11-02 VITALS
DIASTOLIC BLOOD PRESSURE: 72 MMHG | SYSTOLIC BLOOD PRESSURE: 132 MMHG | TEMPERATURE: 97.7 F | HEIGHT: 68 IN | BODY MASS INDEX: 22.85 KG/M2 | WEIGHT: 150.8 LBS | HEART RATE: 80 BPM

## 2018-11-02 DIAGNOSIS — E78.2 MIXED HYPERLIPIDEMIA: ICD-10-CM

## 2018-11-02 DIAGNOSIS — H60.329: ICD-10-CM

## 2018-11-02 DIAGNOSIS — R03.0 BLOOD PRESSURE ELEVATED WITHOUT HISTORY OF HTN: ICD-10-CM

## 2018-11-02 DIAGNOSIS — I10 ESSENTIAL HYPERTENSION: Primary | ICD-10-CM

## 2018-11-02 DIAGNOSIS — Z23 NEED FOR INFLUENZA VACCINATION: ICD-10-CM

## 2018-11-02 LAB
ALBUMIN SERPL BCP-MCNC: 3.8 G/DL (ref 3.5–5)
ALP SERPL-CCNC: 116 U/L (ref 46–116)
ALT SERPL W P-5'-P-CCNC: 20 U/L (ref 12–78)
ANION GAP SERPL CALCULATED.3IONS-SCNC: 9 MMOL/L (ref 4–13)
AST SERPL W P-5'-P-CCNC: 14 U/L (ref 5–45)
BASOPHILS # BLD AUTO: 0.03 THOUSANDS/ΜL (ref 0–0.1)
BASOPHILS NFR BLD AUTO: 1 % (ref 0–1)
BILIRUB SERPL-MCNC: 0.5 MG/DL (ref 0.2–1)
BUN SERPL-MCNC: 15 MG/DL (ref 5–25)
CALCIUM SERPL-MCNC: 8.9 MG/DL (ref 8.3–10.1)
CHLORIDE SERPL-SCNC: 103 MMOL/L (ref 100–108)
CHOLEST SERPL-MCNC: 189 MG/DL (ref 50–200)
CO2 SERPL-SCNC: 29 MMOL/L (ref 21–32)
CREAT SERPL-MCNC: 1.14 MG/DL (ref 0.6–1.3)
EOSINOPHIL # BLD AUTO: 0.32 THOUSAND/ΜL (ref 0–0.61)
EOSINOPHIL NFR BLD AUTO: 5 % (ref 0–6)
ERYTHROCYTE [DISTWIDTH] IN BLOOD BY AUTOMATED COUNT: 12.3 % (ref 11.6–15.1)
GFR SERPL CREATININE-BSD FRML MDRD: 64 ML/MIN/1.73SQ M
GLUCOSE P FAST SERPL-MCNC: 106 MG/DL (ref 65–99)
HCT VFR BLD AUTO: 37.9 % (ref 36.5–49.3)
HDLC SERPL-MCNC: 60 MG/DL (ref 40–60)
HGB BLD-MCNC: 12.9 G/DL (ref 12–17)
IMM GRANULOCYTES # BLD AUTO: 0.02 THOUSAND/UL (ref 0–0.2)
IMM GRANULOCYTES NFR BLD AUTO: 0 % (ref 0–2)
LDLC SERPL CALC-MCNC: 120 MG/DL (ref 0–100)
LYMPHOCYTES # BLD AUTO: 0.8 THOUSANDS/ΜL (ref 0.6–4.47)
LYMPHOCYTES NFR BLD AUTO: 12 % (ref 14–44)
MCH RBC QN AUTO: 31.2 PG (ref 26.8–34.3)
MCHC RBC AUTO-ENTMCNC: 34 G/DL (ref 31.4–37.4)
MCV RBC AUTO: 92 FL (ref 82–98)
MONOCYTES # BLD AUTO: 0.44 THOUSAND/ΜL (ref 0.17–1.22)
MONOCYTES NFR BLD AUTO: 7 % (ref 4–12)
NEUTROPHILS # BLD AUTO: 5.02 THOUSANDS/ΜL (ref 1.85–7.62)
NEUTS SEG NFR BLD AUTO: 75 % (ref 43–75)
NRBC BLD AUTO-RTO: 0 /100 WBCS
PLATELET # BLD AUTO: 215 THOUSANDS/UL (ref 149–390)
PMV BLD AUTO: 9.9 FL (ref 8.9–12.7)
POTASSIUM SERPL-SCNC: 4.1 MMOL/L (ref 3.5–5.3)
PROT SERPL-MCNC: 7.3 G/DL (ref 6.4–8.2)
RBC # BLD AUTO: 4.13 MILLION/UL (ref 3.88–5.62)
SODIUM SERPL-SCNC: 141 MMOL/L (ref 136–145)
TRIGL SERPL-MCNC: 45 MG/DL
TSH SERPL DL<=0.05 MIU/L-ACNC: 0.93 UIU/ML (ref 0.36–3.74)
WBC # BLD AUTO: 6.63 THOUSAND/UL (ref 4.31–10.16)

## 2018-11-02 PROCEDURE — 99213 OFFICE O/P EST LOW 20 MIN: CPT | Performed by: FAMILY MEDICINE

## 2018-11-02 PROCEDURE — 84443 ASSAY THYROID STIM HORMONE: CPT

## 2018-11-02 PROCEDURE — 36415 COLL VENOUS BLD VENIPUNCTURE: CPT

## 2018-11-02 PROCEDURE — G0008 ADMIN INFLUENZA VIRUS VAC: HCPCS | Performed by: FAMILY MEDICINE

## 2018-11-02 PROCEDURE — 80061 LIPID PANEL: CPT

## 2018-11-02 PROCEDURE — 90662 IIV NO PRSV INCREASED AG IM: CPT | Performed by: FAMILY MEDICINE

## 2018-11-02 PROCEDURE — 85025 COMPLETE CBC W/AUTO DIFF WBC: CPT

## 2018-11-02 PROCEDURE — 80053 COMPREHEN METABOLIC PANEL: CPT

## 2018-11-02 NOTE — PROGRESS NOTES
Assessment/Plan:         Diagnoses and all orders for this visit:    Essential hypertension  Comments:  Very good control of BP on increased dose  Flushing symptoms likely adrenergic  If worse, needs metanephrines    Acute hemorrhagic otitis externa, unspecified laterality  -     neomycin-polymyxin-hydrocortisone (CORTISPORIN) otic solution; Administer 4 drops into the left ear every 6 (six) hours    Need for influenza vaccination  -     influenza vaccine, [id number], high-dose, PF 0 5 mL, for patients 65 yr+ (FLUZONE HIGH-DOSE)          Subjective:      Patient ID: Todd Chairez is a 68 y o  male  Having elevation of BP's with facial flushing when seeing Dr Knia Frazier for his prostate cancer    Increased his BP med Amlodipine 5mg to 10mg on our recommendation    Noted bleeding from his L ear for a day or two  He does use Qtips for cleaning  No purulence and no hearing loss        The following portions of the patient's history were reviewed and updated as appropriate: allergies, current medications, past family history, past medical history, past social history, past surgical history and problem list     Review of Systems   Constitutional: Negative for fatigue, fever and unexpected weight change  HENT: Positive for ear discharge and ear pain  Negative for congestion and sinus pressure  Respiratory: Negative  Cardiovascular: Negative  Musculoskeletal: Negative  Neurological: Negative for headaches  Hematological: Negative  Psychiatric/Behavioral: Positive for dysphoric mood  The patient is nervous/anxious  Objective:      /72   Pulse 80   Temp 97 7 °F (36 5 °C)   Ht 5' 8" (1 727 m)   Wt 68 4 kg (150 lb 12 8 oz)   BMI 22 93 kg/m²          Physical Exam   Constitutional: He appears well-nourished  HENT:   Dried blood at outlet from ear canal   Some granulomatous tissue in the area   Cardiovascular: Normal rate, regular rhythm, normal heart sounds and intact distal pulses  Pulmonary/Chest: Effort normal and breath sounds normal    Psychiatric: He has a normal mood and affect  His behavior is normal  Judgment and thought content normal    Nursing note and vitals reviewed

## 2018-11-12 DIAGNOSIS — H60.329: ICD-10-CM

## 2018-12-03 DIAGNOSIS — H60.329: ICD-10-CM

## 2018-12-04 ENCOUNTER — OFFICE VISIT (OUTPATIENT)
Dept: FAMILY MEDICINE CLINIC | Facility: HOSPITAL | Age: 73
End: 2018-12-04
Payer: MEDICARE

## 2018-12-04 VITALS
HEART RATE: 84 BPM | WEIGHT: 152 LBS | BODY MASS INDEX: 23.04 KG/M2 | SYSTOLIC BLOOD PRESSURE: 132 MMHG | DIASTOLIC BLOOD PRESSURE: 72 MMHG | HEIGHT: 68 IN | TEMPERATURE: 97.2 F

## 2018-12-04 DIAGNOSIS — I10 ESSENTIAL HYPERTENSION: Primary | ICD-10-CM

## 2018-12-04 DIAGNOSIS — H60.552: ICD-10-CM

## 2018-12-04 DIAGNOSIS — B00.9 HERPES SIMPLEX: ICD-10-CM

## 2018-12-04 DIAGNOSIS — E78.2 MIXED HYPERLIPIDEMIA: ICD-10-CM

## 2018-12-04 DIAGNOSIS — F60.5 OBSESSIVE COMPULSIVE PERSONALITY DISORDER (HCC): ICD-10-CM

## 2018-12-04 PROCEDURE — 99213 OFFICE O/P EST LOW 20 MIN: CPT | Performed by: FAMILY MEDICINE

## 2018-12-04 RX ORDER — VALACYCLOVIR HYDROCHLORIDE 500 MG/1
500 TABLET, FILM COATED ORAL 2 TIMES DAILY
Qty: 10 TABLET | Refills: 0 | Status: SHIPPED | OUTPATIENT
Start: 2018-12-04 | End: 2020-08-25

## 2018-12-04 NOTE — PROGRESS NOTES
Assessment/Plan:         Diagnoses and all orders for this visit:    Essential hypertension  Comments:  Excellent BP control    Mixed hyperlipidemia    Herpes simplex  Comments:  Rx Valtrex for subsequent event  Orders:  -     valACYclovir (VALTREX) 500 mg tablet; Take 1 tablet (500 mg total) by mouth 2 (two) times a day for 5 days    Reactive otitis externa of left ear, unspecified chronicity  Comments:  Resolving episode    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HCC)          Subjective:      Patient ID: Zane Moody is a 68 y o  male  Follow up    Developed a cold sore on upper lip 2 weeks ago- started with Abreva  Is wondering about AS infection from last visit    Good tolerance of current medications        The following portions of the patient's history were reviewed and updated as appropriate: allergies, current medications, past family history, past medical history, past social history, past surgical history and problem list     Review of Systems   Constitutional: Negative  Respiratory: Negative  Cardiovascular: Negative  Genitourinary: Negative  Musculoskeletal: Negative  Neurological: Negative  Hematological: Negative  Psychiatric/Behavioral: Positive for dysphoric mood  The patient is nervous/anxious  All other systems reviewed and are negative  Objective:      /72   Pulse 84   Temp (!) 97 2 °F (36 2 °C)   Ht 5' 8" (1 727 m)   Wt 68 9 kg (152 lb)   BMI 23 11 kg/m²          Physical Exam   Constitutional: He is oriented to person, place, and time  He appears well-developed and well-nourished  HENT:   Left Ear: External ear normal    Musculoskeletal: He exhibits no edema  Neurological: He is oriented to person, place, and time  Psychiatric: He has a normal mood and affect  His behavior is normal  Judgment and thought content normal    Nursing note and vitals reviewed

## 2019-02-20 DIAGNOSIS — K58.9 IRRITABLE BOWEL SYNDROME, UNSPECIFIED TYPE: Primary | ICD-10-CM

## 2019-02-20 DIAGNOSIS — I10 ESSENTIAL HYPERTENSION: ICD-10-CM

## 2019-02-20 RX ORDER — AMLODIPINE BESYLATE 5 MG/1
TABLET ORAL
Qty: 60 TABLET | Refills: 3 | Status: SHIPPED | OUTPATIENT
Start: 2019-02-20 | End: 2019-06-11 | Stop reason: SDUPTHER

## 2019-02-20 RX ORDER — LINACLOTIDE 290 UG/1
CAPSULE, GELATIN COATED ORAL
Qty: 30 CAPSULE | Refills: 2 | Status: SHIPPED | OUTPATIENT
Start: 2019-02-20 | End: 2019-05-14 | Stop reason: SDUPTHER

## 2019-05-14 DIAGNOSIS — K58.9 IRRITABLE BOWEL SYNDROME, UNSPECIFIED TYPE: ICD-10-CM

## 2019-05-14 RX ORDER — LINACLOTIDE 290 UG/1
CAPSULE, GELATIN COATED ORAL
Qty: 30 CAPSULE | Refills: 2 | Status: SHIPPED | OUTPATIENT
Start: 2019-05-14 | End: 2019-09-02 | Stop reason: SDUPTHER

## 2019-06-03 ENCOUNTER — OFFICE VISIT (OUTPATIENT)
Dept: FAMILY MEDICINE CLINIC | Facility: HOSPITAL | Age: 74
End: 2019-06-03
Payer: MEDICARE

## 2019-06-03 VITALS
WEIGHT: 160 LBS | BODY MASS INDEX: 22.4 KG/M2 | HEART RATE: 82 BPM | SYSTOLIC BLOOD PRESSURE: 140 MMHG | DIASTOLIC BLOOD PRESSURE: 70 MMHG | TEMPERATURE: 96.7 F | HEIGHT: 71 IN

## 2019-06-03 DIAGNOSIS — M85.80 SAPHO SYNDROME (HCC): ICD-10-CM

## 2019-06-03 DIAGNOSIS — C61 PROSTATE CANCER (HCC): ICD-10-CM

## 2019-06-03 DIAGNOSIS — L40.3 SAPHO SYNDROME (HCC): ICD-10-CM

## 2019-06-03 DIAGNOSIS — Z00.00 MEDICARE ANNUAL WELLNESS VISIT, SUBSEQUENT: Primary | ICD-10-CM

## 2019-06-03 DIAGNOSIS — K59.01 SLOW TRANSIT CONSTIPATION: ICD-10-CM

## 2019-06-03 DIAGNOSIS — L70.9 SAPHO SYNDROME (HCC): ICD-10-CM

## 2019-06-03 DIAGNOSIS — M86.9 SAPHO SYNDROME (HCC): ICD-10-CM

## 2019-06-03 DIAGNOSIS — E78.2 MIXED HYPERLIPIDEMIA: ICD-10-CM

## 2019-06-03 DIAGNOSIS — F60.5 OBSESSIVE COMPULSIVE PERSONALITY DISORDER (HCC): ICD-10-CM

## 2019-06-03 DIAGNOSIS — I10 ESSENTIAL HYPERTENSION: ICD-10-CM

## 2019-06-03 DIAGNOSIS — F52.21 ERECTILE DYSFUNCTION OF NON-ORGANIC ORIGIN: ICD-10-CM

## 2019-06-03 DIAGNOSIS — M65.9 SAPHO SYNDROME (HCC): ICD-10-CM

## 2019-06-03 PROBLEM — M65.90 SAPHO SYNDROME (HCC): Status: ACTIVE | Noted: 2019-06-03

## 2019-06-03 PROCEDURE — G0439 PPPS, SUBSEQ VISIT: HCPCS | Performed by: FAMILY MEDICINE

## 2019-06-03 PROCEDURE — 99214 OFFICE O/P EST MOD 30 MIN: CPT | Performed by: FAMILY MEDICINE

## 2019-06-11 DIAGNOSIS — I10 ESSENTIAL HYPERTENSION: ICD-10-CM

## 2019-06-11 RX ORDER — AMLODIPINE BESYLATE 5 MG/1
TABLET ORAL
Qty: 60 TABLET | Refills: 3 | Status: SHIPPED | OUTPATIENT
Start: 2019-06-11 | End: 2019-10-09 | Stop reason: SDUPTHER

## 2019-09-02 DIAGNOSIS — K58.9 IRRITABLE BOWEL SYNDROME, UNSPECIFIED TYPE: ICD-10-CM

## 2019-09-03 RX ORDER — LINACLOTIDE 290 UG/1
CAPSULE, GELATIN COATED ORAL
Qty: 30 CAPSULE | Refills: 2 | Status: SHIPPED | OUTPATIENT
Start: 2019-09-03 | End: 2019-12-02 | Stop reason: SDUPTHER

## 2019-09-10 ENCOUNTER — TRANSITIONAL CARE MANAGEMENT (OUTPATIENT)
Dept: FAMILY MEDICINE CLINIC | Facility: HOSPITAL | Age: 74
End: 2019-09-10

## 2019-10-09 DIAGNOSIS — I10 ESSENTIAL HYPERTENSION: ICD-10-CM

## 2019-10-09 RX ORDER — AMLODIPINE BESYLATE 5 MG/1
TABLET ORAL
Qty: 60 TABLET | Refills: 3 | Status: SHIPPED | OUTPATIENT
Start: 2019-10-09 | End: 2020-02-08

## 2019-10-26 DIAGNOSIS — H60.329: ICD-10-CM

## 2019-12-02 DIAGNOSIS — K58.9 IRRITABLE BOWEL SYNDROME, UNSPECIFIED TYPE: ICD-10-CM

## 2019-12-02 RX ORDER — LINACLOTIDE 290 UG/1
CAPSULE, GELATIN COATED ORAL
Qty: 30 CAPSULE | Refills: 2 | Status: SHIPPED | OUTPATIENT
Start: 2019-12-02 | End: 2020-04-06 | Stop reason: SDUPTHER

## 2019-12-03 ENCOUNTER — OFFICE VISIT (OUTPATIENT)
Dept: FAMILY MEDICINE CLINIC | Facility: HOSPITAL | Age: 74
End: 2019-12-03
Payer: MEDICARE

## 2019-12-03 VITALS
WEIGHT: 158 LBS | HEART RATE: 80 BPM | SYSTOLIC BLOOD PRESSURE: 146 MMHG | DIASTOLIC BLOOD PRESSURE: 72 MMHG | TEMPERATURE: 96.6 F | BODY MASS INDEX: 22.12 KG/M2 | HEIGHT: 71 IN

## 2019-12-03 DIAGNOSIS — E78.2 MIXED HYPERLIPIDEMIA: ICD-10-CM

## 2019-12-03 DIAGNOSIS — I10 ESSENTIAL HYPERTENSION: Primary | ICD-10-CM

## 2019-12-03 DIAGNOSIS — F60.5 OBSESSIVE COMPULSIVE PERSONALITY DISORDER (HCC): ICD-10-CM

## 2019-12-03 DIAGNOSIS — C61 PROSTATE CANCER (HCC): ICD-10-CM

## 2019-12-03 DIAGNOSIS — H60.8X2 CHRONIC REACTIVE OTITIS EXTERNA OF LEFT EAR: ICD-10-CM

## 2019-12-03 DIAGNOSIS — Z23 ENCOUNTER FOR IMMUNIZATION: ICD-10-CM

## 2019-12-03 PROCEDURE — 90662 IIV NO PRSV INCREASED AG IM: CPT | Performed by: FAMILY MEDICINE

## 2019-12-03 PROCEDURE — G0008 ADMIN INFLUENZA VIRUS VAC: HCPCS | Performed by: FAMILY MEDICINE

## 2019-12-03 PROCEDURE — 99214 OFFICE O/P EST MOD 30 MIN: CPT | Performed by: FAMILY MEDICINE

## 2019-12-03 NOTE — PROGRESS NOTES
Assessment/Plan:         Diagnoses and all orders for this visit:    Essential hypertension  Comments:  Excellent control  Orders:  -     CBC and differential; Future  -     Comprehensive metabolic panel; Future  -     TSH, 3rd generation with Free T4 reflex; Future    Prostate cancer [facility])  Comments:  ELIOT  Orders:  -     PSA Total, Diagnostic; Future    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[facility] [address] )  Comments:  Continue current medications through [facility]    Mixed hyperlipidemia  -     Lipid Panel with Direct LDL reflex; Future    Chronic reactive otitis externa of left ear    Encounter for immunization  -     influenza vaccine, [id number], high-dose, PF 0 5 mL (FLUZONE HIGH-DOSE)          Subjective:      Patient ID: Mateo Castano is a 69 y o  male  6 month follow up  Feeling well overall  Recently got a new apartment, in process of moving  It is assisted living  Seen this year by Dr Lauren Mckenzie and is doing well with PSA monitoring every 6 months  Also seen by Dr Phillip Johnson are bothersome, using Cortisporin drops with benefit    OCD under adequate control    Bowel function stable obstipation      The following portions of the patient's history were reviewed and updated as appropriate: allergies, current medications, past family history, past medical history, past social history, past surgical history and problem list     Review of Systems   Constitutional: Negative for unexpected weight change  HENT: Negative  Respiratory: Negative  Cardiovascular: Negative  Gastrointestinal: Negative  Genitourinary: Negative  Musculoskeletal: Negative  Neurological: Negative  Hematological: Negative  Psychiatric/Behavioral: Negative  All other systems reviewed and are negative          Objective:      /72   Pulse 80   Temp (!) 96 6 °F (35 9 °C)   Ht 5' 10 75" (1 797 m)   Wt 71 7 kg (158 lb)   BMI 22 19 kg/m²          Physical Exam   Constitutional: He is oriented to person, place, and time  He appears well-developed and well-nourished  HENT:   Head: Normocephalic  Right Ear: Hearing normal  There is swelling  Left Ear: Hearing normal  No swelling  Eyes: Pupils are equal, round, and reactive to light  EOM are normal    Neck: No thyromegaly present  Cardiovascular: Normal rate, regular rhythm, normal heart sounds and intact distal pulses  Pulmonary/Chest: Effort normal and breath sounds normal    Musculoskeletal: Normal range of motion  Neurological: He is alert and oriented to person, place, and time  Psychiatric: He has a normal mood and affect  His behavior is normal  Judgment and thought content normal    Nursing note and vitals reviewed

## 2020-02-08 DIAGNOSIS — I10 ESSENTIAL HYPERTENSION: ICD-10-CM

## 2020-02-08 RX ORDER — AMLODIPINE BESYLATE 5 MG/1
TABLET ORAL
Qty: 60 TABLET | Refills: 3 | Status: SHIPPED | OUTPATIENT
Start: 2020-02-08 | End: 2020-06-09

## 2020-04-06 DIAGNOSIS — K58.9 IRRITABLE BOWEL SYNDROME, UNSPECIFIED TYPE: ICD-10-CM

## 2020-05-26 ENCOUNTER — APPOINTMENT (OUTPATIENT)
Dept: LAB | Facility: HOSPITAL | Age: 75
End: 2020-05-26
Payer: MEDICARE

## 2020-05-26 DIAGNOSIS — C61 PROSTATE CANCER (HCC): ICD-10-CM

## 2020-05-26 DIAGNOSIS — E78.2 MIXED HYPERLIPIDEMIA: ICD-10-CM

## 2020-05-26 DIAGNOSIS — I10 ESSENTIAL HYPERTENSION: ICD-10-CM

## 2020-05-26 LAB
ALBUMIN SERPL BCP-MCNC: 3.9 G/DL (ref 3.5–5)
ALP SERPL-CCNC: 114 U/L (ref 46–116)
ALT SERPL W P-5'-P-CCNC: 23 U/L (ref 12–78)
ANION GAP SERPL CALCULATED.3IONS-SCNC: 6 MMOL/L (ref 4–13)
AST SERPL W P-5'-P-CCNC: 14 U/L (ref 5–45)
BASOPHILS # BLD AUTO: 0.05 THOUSANDS/ΜL (ref 0–0.1)
BASOPHILS NFR BLD AUTO: 1 % (ref 0–1)
BILIRUB SERPL-MCNC: 0.32 MG/DL (ref 0.2–1)
BUN SERPL-MCNC: 16 MG/DL (ref 5–25)
CALCIUM SERPL-MCNC: 8.5 MG/DL (ref 8.3–10.1)
CHLORIDE SERPL-SCNC: 104 MMOL/L (ref 100–108)
CHOLEST SERPL-MCNC: 186 MG/DL (ref 50–200)
CO2 SERPL-SCNC: 27 MMOL/L (ref 21–32)
CREAT SERPL-MCNC: 1.23 MG/DL (ref 0.6–1.3)
EOSINOPHIL # BLD AUTO: 0.49 THOUSAND/ΜL (ref 0–0.61)
EOSINOPHIL NFR BLD AUTO: 6 % (ref 0–6)
ERYTHROCYTE [DISTWIDTH] IN BLOOD BY AUTOMATED COUNT: 14 % (ref 11.6–15.1)
GFR SERPL CREATININE-BSD FRML MDRD: 58 ML/MIN/1.73SQ M
GLUCOSE P FAST SERPL-MCNC: 89 MG/DL (ref 65–99)
HCT VFR BLD AUTO: 33.9 % (ref 36.5–49.3)
HDLC SERPL-MCNC: 65 MG/DL
HGB BLD-MCNC: 10.2 G/DL (ref 12–17)
IMM GRANULOCYTES # BLD AUTO: 0.02 THOUSAND/UL (ref 0–0.2)
IMM GRANULOCYTES NFR BLD AUTO: 0 % (ref 0–2)
LDLC SERPL CALC-MCNC: 107 MG/DL (ref 0–100)
LYMPHOCYTES # BLD AUTO: 1.35 THOUSANDS/ΜL (ref 0.6–4.47)
LYMPHOCYTES NFR BLD AUTO: 17 % (ref 14–44)
MCH RBC QN AUTO: 25.7 PG (ref 26.8–34.3)
MCHC RBC AUTO-ENTMCNC: 30.1 G/DL (ref 31.4–37.4)
MCV RBC AUTO: 85 FL (ref 82–98)
MONOCYTES # BLD AUTO: 0.7 THOUSAND/ΜL (ref 0.17–1.22)
MONOCYTES NFR BLD AUTO: 9 % (ref 4–12)
NEUTROPHILS # BLD AUTO: 5.56 THOUSANDS/ΜL (ref 1.85–7.62)
NEUTS SEG NFR BLD AUTO: 67 % (ref 43–75)
NRBC BLD AUTO-RTO: 0 /100 WBCS
PLATELET # BLD AUTO: 262 THOUSANDS/UL (ref 149–390)
PMV BLD AUTO: 12 FL (ref 8.9–12.7)
POTASSIUM SERPL-SCNC: 4 MMOL/L (ref 3.5–5.3)
PROT SERPL-MCNC: 7.8 G/DL (ref 6.4–8.2)
PSA SERPL-MCNC: <0.1 NG/ML (ref 0–4)
RBC # BLD AUTO: 3.97 MILLION/UL (ref 3.88–5.62)
SODIUM SERPL-SCNC: 137 MMOL/L (ref 136–145)
TRIGL SERPL-MCNC: 72 MG/DL
TSH SERPL DL<=0.05 MIU/L-ACNC: 2.14 UIU/ML (ref 0.36–3.74)
WBC # BLD AUTO: 8.17 THOUSAND/UL (ref 4.31–10.16)

## 2020-05-26 PROCEDURE — 85025 COMPLETE CBC W/AUTO DIFF WBC: CPT

## 2020-05-26 PROCEDURE — 80053 COMPREHEN METABOLIC PANEL: CPT

## 2020-05-26 PROCEDURE — 84443 ASSAY THYROID STIM HORMONE: CPT

## 2020-05-26 PROCEDURE — 84153 ASSAY OF PSA TOTAL: CPT

## 2020-05-26 PROCEDURE — 36415 COLL VENOUS BLD VENIPUNCTURE: CPT

## 2020-05-26 PROCEDURE — 80061 LIPID PANEL: CPT

## 2020-06-02 ENCOUNTER — OFFICE VISIT (OUTPATIENT)
Dept: FAMILY MEDICINE CLINIC | Facility: HOSPITAL | Age: 75
End: 2020-06-02
Payer: MEDICARE

## 2020-06-02 VITALS
BODY MASS INDEX: 23.99 KG/M2 | TEMPERATURE: 97.7 F | SYSTOLIC BLOOD PRESSURE: 134 MMHG | HEIGHT: 69 IN | DIASTOLIC BLOOD PRESSURE: 70 MMHG | HEART RATE: 83 BPM | WEIGHT: 162 LBS

## 2020-06-02 DIAGNOSIS — F60.5 OBSESSIVE COMPULSIVE PERSONALITY DISORDER (HCC): ICD-10-CM

## 2020-06-02 DIAGNOSIS — Z00.00 MEDICARE ANNUAL WELLNESS VISIT, SUBSEQUENT: Primary | ICD-10-CM

## 2020-06-02 DIAGNOSIS — K21.00 ESOPHAGITIS, REFLUX: ICD-10-CM

## 2020-06-02 DIAGNOSIS — D50.0 IRON DEFICIENCY ANEMIA DUE TO CHRONIC BLOOD LOSS: ICD-10-CM

## 2020-06-02 DIAGNOSIS — I10 ESSENTIAL HYPERTENSION: ICD-10-CM

## 2020-06-02 DIAGNOSIS — C61 PROSTATE CANCER (HCC): ICD-10-CM

## 2020-06-02 PROCEDURE — G0439 PPPS, SUBSEQ VISIT: HCPCS | Performed by: FAMILY MEDICINE

## 2020-06-02 PROCEDURE — 4040F PNEUMOC VAC/ADMIN/RCVD: CPT | Performed by: FAMILY MEDICINE

## 2020-06-02 PROCEDURE — 3075F SYST BP GE 130 - 139MM HG: CPT | Performed by: FAMILY MEDICINE

## 2020-06-02 PROCEDURE — 3008F BODY MASS INDEX DOCD: CPT | Performed by: FAMILY MEDICINE

## 2020-06-02 PROCEDURE — 1125F AMNT PAIN NOTED PAIN PRSNT: CPT | Performed by: FAMILY MEDICINE

## 2020-06-02 PROCEDURE — 1160F RVW MEDS BY RX/DR IN RCRD: CPT | Performed by: FAMILY MEDICINE

## 2020-06-02 PROCEDURE — 1036F TOBACCO NON-USER: CPT | Performed by: FAMILY MEDICINE

## 2020-06-02 PROCEDURE — 1170F FXNL STATUS ASSESSED: CPT | Performed by: FAMILY MEDICINE

## 2020-06-02 PROCEDURE — 3078F DIAST BP <80 MM HG: CPT | Performed by: FAMILY MEDICINE

## 2020-06-02 PROCEDURE — 99214 OFFICE O/P EST MOD 30 MIN: CPT | Performed by: FAMILY MEDICINE

## 2020-06-02 RX ORDER — FERROUS SULFATE TAB EC 324 MG (65 MG FE EQUIVALENT) 324 (65 FE) MG
324 TABLET DELAYED RESPONSE ORAL
Start: 2020-06-02 | End: 2020-06-24 | Stop reason: SDUPTHER

## 2020-06-09 DIAGNOSIS — I10 ESSENTIAL HYPERTENSION: ICD-10-CM

## 2020-06-09 RX ORDER — AMLODIPINE BESYLATE 5 MG/1
TABLET ORAL
Qty: 60 TABLET | Refills: 5 | Status: SHIPPED | OUTPATIENT
Start: 2020-06-09 | End: 2020-12-02

## 2020-06-24 DIAGNOSIS — D50.0 IRON DEFICIENCY ANEMIA DUE TO CHRONIC BLOOD LOSS: ICD-10-CM

## 2020-06-24 RX ORDER — FERROUS SULFATE TAB EC 324 MG (65 MG FE EQUIVALENT) 324 (65 FE) MG
324 TABLET DELAYED RESPONSE ORAL
Qty: 60 TABLET | Refills: 5 | Status: SHIPPED | OUTPATIENT
Start: 2020-06-24 | End: 2021-01-03

## 2020-07-02 DIAGNOSIS — K58.9 IRRITABLE BOWEL SYNDROME, UNSPECIFIED TYPE: ICD-10-CM

## 2020-07-06 RX ORDER — LINACLOTIDE 290 UG/1
CAPSULE, GELATIN COATED ORAL
Qty: 30 CAPSULE | Refills: 2 | Status: SHIPPED | OUTPATIENT
Start: 2020-07-06 | End: 2020-08-25 | Stop reason: SDUPTHER

## 2020-08-03 ENCOUNTER — APPOINTMENT (OUTPATIENT)
Dept: LAB | Facility: HOSPITAL | Age: 75
End: 2020-08-03
Payer: MEDICARE

## 2020-08-03 ENCOUNTER — TELEPHONE (OUTPATIENT)
Dept: GASTROENTEROLOGY | Facility: CLINIC | Age: 75
End: 2020-08-03

## 2020-08-03 DIAGNOSIS — D50.0 IRON DEFICIENCY ANEMIA DUE TO CHRONIC BLOOD LOSS: ICD-10-CM

## 2020-08-03 LAB
BASOPHILS # BLD AUTO: 0.04 THOUSANDS/ΜL (ref 0–0.1)
BASOPHILS NFR BLD AUTO: 1 % (ref 0–1)
EOSINOPHIL # BLD AUTO: 0.48 THOUSAND/ΜL (ref 0–0.61)
EOSINOPHIL NFR BLD AUTO: 7 % (ref 0–6)
ERYTHROCYTE [DISTWIDTH] IN BLOOD BY AUTOMATED COUNT: 15.8 % (ref 11.6–15.1)
FERRITIN SERPL-MCNC: 13 NG/ML (ref 8–388)
HCT VFR BLD AUTO: 39.8 % (ref 36.5–49.3)
HGB BLD-MCNC: 12.6 G/DL (ref 12–17)
IMM GRANULOCYTES # BLD AUTO: 0.02 THOUSAND/UL (ref 0–0.2)
IMM GRANULOCYTES NFR BLD AUTO: 0 % (ref 0–2)
LYMPHOCYTES # BLD AUTO: 1.43 THOUSANDS/ΜL (ref 0.6–4.47)
LYMPHOCYTES NFR BLD AUTO: 19 % (ref 14–44)
MCH RBC QN AUTO: 27.8 PG (ref 26.8–34.3)
MCHC RBC AUTO-ENTMCNC: 31.7 G/DL (ref 31.4–37.4)
MCV RBC AUTO: 88 FL (ref 82–98)
MONOCYTES # BLD AUTO: 0.58 THOUSAND/ΜL (ref 0.17–1.22)
MONOCYTES NFR BLD AUTO: 8 % (ref 4–12)
NEUTROPHILS # BLD AUTO: 4.87 THOUSANDS/ΜL (ref 1.85–7.62)
NEUTS SEG NFR BLD AUTO: 65 % (ref 43–75)
NRBC BLD AUTO-RTO: 0 /100 WBCS
PLATELET # BLD AUTO: 225 THOUSANDS/UL (ref 149–390)
PMV BLD AUTO: 11.5 FL (ref 8.9–12.7)
RBC # BLD AUTO: 4.53 MILLION/UL (ref 3.88–5.62)
VIT B12 SERPL-MCNC: 570 PG/ML (ref 100–900)
WBC # BLD AUTO: 7.42 THOUSAND/UL (ref 4.31–10.16)

## 2020-08-03 PROCEDURE — 36415 COLL VENOUS BLD VENIPUNCTURE: CPT

## 2020-08-03 PROCEDURE — 85025 COMPLETE CBC W/AUTO DIFF WBC: CPT

## 2020-08-03 PROCEDURE — 82728 ASSAY OF FERRITIN: CPT

## 2020-08-03 PROCEDURE — 82607 VITAMIN B-12: CPT

## 2020-08-03 NOTE — TELEPHONE ENCOUNTER
[facility] faxed form requesting formulary drug therapy change from [address]  I left message for patient to call in  He has not been seen since 2017 so we cannot do a prior authorization without current visit  Formulary covers Amitiza or Lactulose  Also confirm pt address, [facility] [id number]

## 2020-08-12 ENCOUNTER — TELEPHONE (OUTPATIENT)
Dept: FAMILY MEDICINE CLINIC | Facility: HOSPITAL | Age: 75
End: 2020-08-12

## 2020-08-12 NOTE — TELEPHONE ENCOUNTER
His anemia is gone and iron is back to normal   I would suggest continuing iron twice a week ongoing

## 2020-08-18 NOTE — TELEPHONE ENCOUNTER
Called third time requesting patient call in to discuss medications and scheduling a follow up appointment  Office phone number left

## 2020-08-25 ENCOUNTER — OFFICE VISIT (OUTPATIENT)
Dept: GASTROENTEROLOGY | Facility: CLINIC | Age: 75
End: 2020-08-25
Payer: MEDICARE

## 2020-08-25 VITALS
WEIGHT: 162 LBS | DIASTOLIC BLOOD PRESSURE: 68 MMHG | SYSTOLIC BLOOD PRESSURE: 144 MMHG | BODY MASS INDEX: 23.99 KG/M2 | TEMPERATURE: 97.5 F | HEART RATE: 89 BPM | HEIGHT: 69 IN

## 2020-08-25 DIAGNOSIS — K59.01 SLOW TRANSIT CONSTIPATION: Primary | ICD-10-CM

## 2020-08-25 DIAGNOSIS — K58.9 IRRITABLE BOWEL SYNDROME, UNSPECIFIED TYPE: ICD-10-CM

## 2020-08-25 DIAGNOSIS — Z12.11 ENCOUNTER FOR SCREENING COLONOSCOPY: ICD-10-CM

## 2020-08-25 PROCEDURE — 3008F BODY MASS INDEX DOCD: CPT | Performed by: NURSE PRACTITIONER

## 2020-08-25 PROCEDURE — 4040F PNEUMOC VAC/ADMIN/RCVD: CPT | Performed by: NURSE PRACTITIONER

## 2020-08-25 PROCEDURE — 1160F RVW MEDS BY RX/DR IN RCRD: CPT | Performed by: NURSE PRACTITIONER

## 2020-08-25 PROCEDURE — 3077F SYST BP >= 140 MM HG: CPT | Performed by: NURSE PRACTITIONER

## 2020-08-25 PROCEDURE — 3078F DIAST BP <80 MM HG: CPT | Performed by: NURSE PRACTITIONER

## 2020-08-25 PROCEDURE — 1036F TOBACCO NON-USER: CPT | Performed by: NURSE PRACTITIONER

## 2020-08-25 PROCEDURE — 99214 OFFICE O/P EST MOD 30 MIN: CPT | Performed by: NURSE PRACTITIONER

## 2020-08-25 RX ORDER — LINACLOTIDE 290 UG/1
1 CAPSULE, GELATIN COATED ORAL DAILY
Qty: 30 CAPSULE | Refills: 11 | Status: SHIPPED | OUTPATIENT
Start: 2020-08-25 | End: 2021-03-08

## 2020-11-04 ENCOUNTER — TELEPHONE (OUTPATIENT)
Dept: FAMILY MEDICINE CLINIC | Facility: HOSPITAL | Age: 75
End: 2020-11-04

## 2020-11-22 DIAGNOSIS — Z11.59 ENCOUNTER FOR HEPATITIS C SCREENING TEST FOR LOW RISK PATIENT: ICD-10-CM

## 2020-11-22 DIAGNOSIS — I10 ESSENTIAL HYPERTENSION: Primary | ICD-10-CM

## 2020-11-22 DIAGNOSIS — E78.2 MIXED HYPERLIPIDEMIA: ICD-10-CM

## 2020-11-22 DIAGNOSIS — C61 PROSTATE CANCER (HCC): ICD-10-CM

## 2020-11-22 DIAGNOSIS — D50.0 IRON DEFICIENCY ANEMIA DUE TO CHRONIC BLOOD LOSS: ICD-10-CM

## 2020-11-23 ENCOUNTER — LAB (OUTPATIENT)
Dept: LAB | Facility: HOSPITAL | Age: 75
End: 2020-11-23
Payer: MEDICARE

## 2020-11-23 DIAGNOSIS — I10 ESSENTIAL HYPERTENSION: ICD-10-CM

## 2020-11-23 DIAGNOSIS — Z11.59 ENCOUNTER FOR HEPATITIS C SCREENING TEST FOR LOW RISK PATIENT: ICD-10-CM

## 2020-11-23 DIAGNOSIS — C61 PROSTATE CANCER (HCC): ICD-10-CM

## 2020-11-23 DIAGNOSIS — E78.2 MIXED HYPERLIPIDEMIA: ICD-10-CM

## 2020-11-23 DIAGNOSIS — D50.0 IRON DEFICIENCY ANEMIA DUE TO CHRONIC BLOOD LOSS: ICD-10-CM

## 2020-11-23 LAB
ALBUMIN SERPL BCP-MCNC: 3.8 G/DL (ref 3.5–5)
ALP SERPL-CCNC: 131 U/L (ref 46–116)
ALT SERPL W P-5'-P-CCNC: 17 U/L (ref 12–78)
ANION GAP SERPL CALCULATED.3IONS-SCNC: 3 MMOL/L (ref 4–13)
AST SERPL W P-5'-P-CCNC: 10 U/L (ref 5–45)
BILIRUB SERPL-MCNC: 0.42 MG/DL (ref 0.2–1)
BUN SERPL-MCNC: 18 MG/DL (ref 5–25)
CALCIUM SERPL-MCNC: 8.6 MG/DL (ref 8.3–10.1)
CHLORIDE SERPL-SCNC: 109 MMOL/L (ref 100–108)
CHOLEST SERPL-MCNC: 193 MG/DL (ref 50–200)
CO2 SERPL-SCNC: 29 MMOL/L (ref 21–32)
CREAT SERPL-MCNC: 1.24 MG/DL (ref 0.6–1.3)
ERYTHROCYTE [DISTWIDTH] IN BLOOD BY AUTOMATED COUNT: 12.9 % (ref 11.6–15.1)
GFR SERPL CREATININE-BSD FRML MDRD: 57 ML/MIN/1.73SQ M
GLUCOSE P FAST SERPL-MCNC: 91 MG/DL (ref 65–99)
HCT VFR BLD AUTO: 39.5 % (ref 36.5–49.3)
HCV AB SER QL: NORMAL
HDLC SERPL-MCNC: 58 MG/DL
HGB BLD-MCNC: 12.9 G/DL (ref 12–17)
LDLC SERPL CALC-MCNC: 115 MG/DL (ref 0–100)
MCH RBC QN AUTO: 30.6 PG (ref 26.8–34.3)
MCHC RBC AUTO-ENTMCNC: 32.7 G/DL (ref 31.4–37.4)
MCV RBC AUTO: 94 FL (ref 82–98)
PLATELET # BLD AUTO: 215 THOUSANDS/UL (ref 149–390)
PMV BLD AUTO: 11.4 FL (ref 8.9–12.7)
POTASSIUM SERPL-SCNC: 4 MMOL/L (ref 3.5–5.3)
PROT SERPL-MCNC: 7.2 G/DL (ref 6.4–8.2)
PSA SERPL-MCNC: <0.1 NG/ML (ref 0–4)
RBC # BLD AUTO: 4.22 MILLION/UL (ref 3.88–5.62)
SODIUM SERPL-SCNC: 141 MMOL/L (ref 136–145)
TRIGL SERPL-MCNC: 98 MG/DL
TSH SERPL DL<=0.05 MIU/L-ACNC: 2.05 UIU/ML (ref 0.36–3.74)
WBC # BLD AUTO: 7.68 THOUSAND/UL (ref 4.31–10.16)

## 2020-11-23 PROCEDURE — 85027 COMPLETE CBC AUTOMATED: CPT

## 2020-11-23 PROCEDURE — 84443 ASSAY THYROID STIM HORMONE: CPT

## 2020-11-23 PROCEDURE — 80061 LIPID PANEL: CPT

## 2020-11-23 PROCEDURE — 86803 HEPATITIS C AB TEST: CPT

## 2020-11-23 PROCEDURE — 80053 COMPREHEN METABOLIC PANEL: CPT

## 2020-11-23 PROCEDURE — 84153 ASSAY OF PSA TOTAL: CPT

## 2020-11-23 PROCEDURE — 36415 COLL VENOUS BLD VENIPUNCTURE: CPT

## 2020-12-01 ENCOUNTER — OFFICE VISIT (OUTPATIENT)
Dept: FAMILY MEDICINE CLINIC | Facility: HOSPITAL | Age: 75
End: 2020-12-01
Payer: MEDICARE

## 2020-12-01 VITALS
TEMPERATURE: 97.5 F | HEART RATE: 79 BPM | WEIGHT: 167 LBS | HEIGHT: 69 IN | DIASTOLIC BLOOD PRESSURE: 80 MMHG | SYSTOLIC BLOOD PRESSURE: 150 MMHG | BODY MASS INDEX: 24.73 KG/M2

## 2020-12-01 DIAGNOSIS — C61 PROSTATE CANCER (HCC): ICD-10-CM

## 2020-12-01 DIAGNOSIS — I83.023 STASIS ULCER OF ANKLE, LEFT (HCC): ICD-10-CM

## 2020-12-01 DIAGNOSIS — F60.5 OBSESSIVE COMPULSIVE PERSONALITY DISORDER (HCC): ICD-10-CM

## 2020-12-01 DIAGNOSIS — I10 ESSENTIAL HYPERTENSION: Primary | ICD-10-CM

## 2020-12-01 DIAGNOSIS — E78.2 MIXED HYPERLIPIDEMIA: ICD-10-CM

## 2020-12-01 DIAGNOSIS — Z23 ENCOUNTER FOR IMMUNIZATION: ICD-10-CM

## 2020-12-01 DIAGNOSIS — H60.329: ICD-10-CM

## 2020-12-01 DIAGNOSIS — L97.329 STASIS ULCER OF ANKLE, LEFT (HCC): ICD-10-CM

## 2020-12-01 PROCEDURE — 90662 IIV NO PRSV INCREASED AG IM: CPT | Performed by: FAMILY MEDICINE

## 2020-12-01 PROCEDURE — 99214 OFFICE O/P EST MOD 30 MIN: CPT | Performed by: FAMILY MEDICINE

## 2020-12-01 PROCEDURE — G0008 ADMIN INFLUENZA VIRUS VAC: HCPCS | Performed by: FAMILY MEDICINE

## 2020-12-02 DIAGNOSIS — I10 ESSENTIAL HYPERTENSION: ICD-10-CM

## 2020-12-02 RX ORDER — AMLODIPINE BESYLATE 5 MG/1
TABLET ORAL
Qty: 60 TABLET | Refills: 5 | Status: SHIPPED | OUTPATIENT
Start: 2020-12-02 | End: 2021-05-05

## 2021-01-02 DIAGNOSIS — D50.0 IRON DEFICIENCY ANEMIA DUE TO CHRONIC BLOOD LOSS: ICD-10-CM

## 2021-01-03 RX ORDER — FERROUS SULFATE TAB EC 324 MG (65 MG FE EQUIVALENT) 324 (65 FE) MG
TABLET DELAYED RESPONSE ORAL
Qty: 60 TABLET | Refills: 5 | Status: SHIPPED | OUTPATIENT
Start: 2021-01-03 | End: 2021-06-01 | Stop reason: ALTCHOICE

## 2021-03-06 DIAGNOSIS — K58.9 IRRITABLE BOWEL SYNDROME, UNSPECIFIED TYPE: ICD-10-CM

## 2021-03-08 RX ORDER — LINACLOTIDE 290 UG/1
CAPSULE, GELATIN COATED ORAL
Qty: 30 CAPSULE | Refills: 11 | Status: SHIPPED | OUTPATIENT
Start: 2021-03-08 | End: 2021-09-13

## 2021-05-05 DIAGNOSIS — I10 ESSENTIAL HYPERTENSION: ICD-10-CM

## 2021-05-05 RX ORDER — AMLODIPINE BESYLATE 5 MG/1
TABLET ORAL
Qty: 180 TABLET | Refills: 1 | Status: SHIPPED | OUTPATIENT
Start: 2021-05-05 | End: 2021-11-10

## 2021-05-27 ENCOUNTER — LAB (OUTPATIENT)
Dept: LAB | Facility: HOSPITAL | Age: 76
End: 2021-05-27
Payer: MEDICARE

## 2021-05-27 DIAGNOSIS — C61 PROSTATE CANCER (HCC): ICD-10-CM

## 2021-05-27 LAB — PSA SERPL-MCNC: 0.1 NG/ML (ref 0–4)

## 2021-05-27 PROCEDURE — 84153 ASSAY OF PSA TOTAL: CPT

## 2021-06-01 ENCOUNTER — OFFICE VISIT (OUTPATIENT)
Dept: FAMILY MEDICINE CLINIC | Facility: HOSPITAL | Age: 76
End: 2021-06-01
Payer: MEDICARE

## 2021-06-01 VITALS
HEART RATE: 70 BPM | TEMPERATURE: 96.9 F | DIASTOLIC BLOOD PRESSURE: 72 MMHG | WEIGHT: 171 LBS | BODY MASS INDEX: 24.48 KG/M2 | HEIGHT: 70 IN | SYSTOLIC BLOOD PRESSURE: 136 MMHG

## 2021-06-01 DIAGNOSIS — I10 ESSENTIAL HYPERTENSION: ICD-10-CM

## 2021-06-01 DIAGNOSIS — F60.5 OBSESSIVE COMPULSIVE PERSONALITY DISORDER (HCC): ICD-10-CM

## 2021-06-01 DIAGNOSIS — G21.19 PARKINSONISM DUE TO DRUGS (HCC): ICD-10-CM

## 2021-06-01 DIAGNOSIS — E78.2 MIXED HYPERLIPIDEMIA: ICD-10-CM

## 2021-06-01 DIAGNOSIS — K22.70 BARRETT'S ESOPHAGUS WITHOUT DYSPLASIA: ICD-10-CM

## 2021-06-01 DIAGNOSIS — C61 PROSTATE CANCER (HCC): ICD-10-CM

## 2021-06-01 DIAGNOSIS — Z00.00 MEDICARE ANNUAL WELLNESS VISIT, SUBSEQUENT: Primary | ICD-10-CM

## 2021-06-01 PROCEDURE — G0439 PPPS, SUBSEQ VISIT: HCPCS | Performed by: FAMILY MEDICINE

## 2021-06-01 PROCEDURE — 1123F ACP DISCUSS/DSCN MKR DOCD: CPT | Performed by: FAMILY MEDICINE

## 2021-06-01 PROCEDURE — 99214 OFFICE O/P EST MOD 30 MIN: CPT | Performed by: FAMILY MEDICINE

## 2021-06-01 RX ORDER — FLUOXETINE HYDROCHLORIDE 40 MG/1
CAPSULE ORAL
COMMUNITY
Start: 2021-05-27 | End: 2022-02-07 | Stop reason: DRUGHIGH

## 2021-06-01 NOTE — PATIENT INSTRUCTIONS
Infant discharged to home with parents per MD orders. Discharge instructions reviewed with parents. Questions encouraged and answered. mother verbalizes understanding. Infant identification band removed and verified with identification sheet and mother. HUGS band discharged and removed from infant ankle. Mother to call out when ready to be escorted out Medicare Preventive Visit Patient Instructions  Thank you for completing your Welcome to Medicare Visit or Medicare Annual Wellness Visit today  Your next wellness visit will be due in one year (6/2/2022)  The screening/preventive services that you may require over the next 5-10 years are detailed below  Some tests may not apply to you based off risk factors and/or age  Screening tests ordered at today's visit but not completed yet may show as past due  Also, please note that scanned in results may not display below  Preventive Screenings:  Service Recommendations Previous Testing/Comments   Colorectal Cancer Screening  · Colonoscopy    · Fecal Occult Blood Test (FOBT)/Fecal Immunochemical Test (FIT)  · Fecal DNA/Cologuard Test  · Flexible Sigmoidoscopy Age: 54-65 years old   Colonoscopy: every 10 years (May be performed more frequently if at higher risk)  OR  FOBT/FIT: every 1 year  OR  Cologuard: every 3 years  OR  Sigmoidoscopy: every 5 years  Screening may be recommended earlier than age 48 if at higher risk for colorectal cancer  Also, an individualized decision between you and your healthcare provider will decide whether screening between the ages of 74-80 would be appropriate   Colonoscopy: 07/12/2017  FOBT/FIT: Not on file  Cologuard: Not on file  Sigmoidoscopy: Not on file    Screening Current     Prostate Cancer Screening Individualized decision between patient and health care provider in men between ages of 53-78   Medicare will cover every 12 months beginning on the day after your 50th birthday PSA: 0 1 ng/mL     History Prostate Cancer     Hepatitis C Screening Once for adults born between 1945 and 1965  More frequently in patients at high risk for Hepatitis C Hep C Antibody: 11/23/2020    Screening Current   Diabetes Screening 1-2 times per year if you're at risk for diabetes or have pre-diabetes Fasting glucose: 91 mg/dL   A1C: No results in last 5 years    Screening Current   Cholesterol Screening Once every 5 years if you don't have a lipid disorder  May order more often based on risk factors  Lipid panel: 11/23/2020    Screening Not Indicated  History Lipid Disorder      Other Preventive Screenings Covered by Medicare:  1  Abdominal Aortic Aneurysm (AAA) Screening: covered once if your at risk  You're considered to be at risk if you have a family history of AAA or a male between the age of 73-68 who smoking at least 100 cigarettes in your lifetime  2  Lung Cancer Screening: covers low dose CT scan once per year if you meet all of the following conditions: (1) Age 50-69; (2) No signs or symptoms of lung cancer; (3) Current smoker or have quit smoking within the last 15 years; (4) You have a tobacco smoking history of at least 30 pack years (packs per day x number of years you smoked); (5) You get a written order from a healthcare provider  3  Glaucoma Screening: covered annually if you're considered high risk: (1) You have diabetes OR (2) Family history of glaucoma OR (3)  aged 48 and older OR (3)  American aged 72 and older  3  Osteoporosis Screening: covered every 2 years if you meet one of the following conditions: (1) Have a vertebral abnormality; (2) On glucocorticoid therapy for more than 3 months; (3) Have primary hyperparathyroidism; (4) On osteoporosis medications and need to assess response to drug therapy  5  HIV Screening: covered annually if you're between the age of 12-76  Also covered annually if you are younger than 13 and older than 72 with risk factors for HIV infection  For pregnant patients, it is covered up to 3 times per pregnancy      Immunizations:  Immunization Recommendations   Influenza Vaccine Annual influenza vaccination during flu season is recommended for all persons aged >= 6 months who do not have contraindications   Pneumococcal Vaccine (Prevnar and Pneumovax)  * Prevnar = PCV13  * Pneumovax = PPSV23 Adults 25-60 years old: 1-3 doses may be recommended based on certain risk factors  Adults 72 years old: Prevnar (PCV13) vaccine recommended followed by Pneumovax (PPSV23) vaccine  If already received PPSV23 since turning 65, then PCV13 recommended at least one year after PPSV23 dose  Hepatitis B Vaccine 3 dose series if at intermediate or high risk (ex: diabetes, end stage renal disease, liver disease)   Tetanus (Td) Vaccine - COST NOT COVERED BY MEDICARE PART B Following completion of primary series, a booster dose should be given every 10 years to maintain immunity against tetanus  Td may also be given as tetanus wound prophylaxis  Tdap Vaccine - COST NOT COVERED BY MEDICARE PART B Recommended at least once for all adults  For pregnant patients, recommended with each pregnancy  Shingles Vaccine (Shingrix) - COST NOT COVERED BY MEDICARE PART B  2 shot series recommended in those aged 48 and above     Health Maintenance Due:      Topic Date Due    Colorectal Cancer Screening  07/12/2027    Hepatitis C Screening  Completed     Immunizations Due:      Topic Date Due    COVID-19 Vaccine (1) Never done     Advance Directives   What are advance directives? Advance directives are legal documents that state your wishes and plans for medical care  These plans are made ahead of time in case you lose your ability to make decisions for yourself  Advance directives can apply to any medical decision, such as the treatments you want, and if you want to donate organs  What are the types of advance directives? There are many types of advance directives, and each state has rules about how to use them  You may choose a combination of any of the following:  · Living will: This is a written record of the treatment you want  You can also choose which treatments you do not want, which to limit, and which to stop at a certain time  This includes surgery, medicine, IV fluid, and tube feedings  · Durable power of  for healthcare [facility]):   This is a written record that states who you want to make healthcare choices for you when you are unable to make them for yourself  This person, called a proxy, is usually a family member or a friend  You may choose more than 1 proxy  · Do not resuscitate (DNR) order:  A DNR order is used in case your heart stops beating or you stop breathing  It is a request not to have certain forms of treatment, such as CPR  A DNR order may be included in other types of advance directives  · Medical directive: This covers the care that you want if you are in a coma, near death, or unable to make decisions for yourself  You can list the treatments you want for each condition  Treatment may include pain medicine, surgery, blood transfusions, dialysis, IV or tube feedings, and a ventilator (breathing machine)  · Values history: This document has questions about your views, beliefs, and how you feel and think about life  This information can help others choose the care that you would choose  Why are advance directives important? An advance directive helps you control your care  Although spoken wishes may be used, it is better to have your wishes written down  Spoken wishes can be misunderstood, or not followed  Treatments may be given even if you do not want them  An advance directive may make it easier for your family to make difficult choices about your care  © Copyright [address] 2018 Information is for End User's use only and may not be sold, redistributed or otherwise used for commercial purposes  All illustrations and images included in CareNotes® are the copyrighted property of [employer]  or [address] [place] [address] for Adults   AMBULATORY CARE:   A wellness visit  is when you see your healthcare provider to get screened for health problems  Your healthcare provider will also give you advice on how to stay healthy  Write down your questions so you remember to ask them   Ask your healthcare provider how often you should have a wellness visit  What happens at a wellness visit:  Your healthcare provider will ask about your health, and your family history of health problems  This includes high blood pressure, heart disease, and cancer  He or she will ask if you have symptoms that concern you, if you smoke, and about your mood  You may also be asked about your intake of medicines, supplements, food, and alcohol  Any of the following may be done:  · Your weight  will be checked  Your height may also be checked so your body mass index (BMI) can be calculated  Your BMI shows if you are at a healthy weight  · Your blood pressure  and heart rate will be checked  Your temperature may also be checked  · Blood and urine tests  may be done  Blood tests may be done to check your cholesterol levels  Abnormal cholesterol levels increase your risk for heart disease and stroke  You may also need a blood or urine test to check for diabetes if you are at increased risk  Urine tests may be done to look for signs of an infection or kidney disease  · A physical exam  includes checking your heartbeat and lungs with a stethoscope  Your healthcare provider may also check your skin to look for sun damage  · Screening tests  may be recommended  A screening test is done to check for diseases that may not cause symptoms  The screening tests you may need depend on your age, gender, family history, and lifestyle habits  For example, colorectal screening may be recommended if you are 48years old or older  Screening tests you need if you are a woman:   · A Pap smear  is used to screen for cervical cancer  Pap smears are usually done every 3 to 5 years depending on your age  You may need them more often if you have had abnormal Pap smear test results in the past  Ask your healthcare provider how often you should have a Pap smear  · A mammogram  is an x-ray of your breasts to screen for breast cancer   Experts recommend mammograms every 2 years starting at age 48 years  You may need a mammogram at age 52 years or younger if you have an increased risk for breast cancer  Talk to your healthcare provider about when you should start having mammograms and how often you need them  Vaccines you may need:   · Get an influenza vaccine  every year  The influenza vaccine protects you from the flu  Several types of viruses cause the flu  The viruses change over time, so new vaccines are made each year  · Get a tetanus-diphtheria (Td) booster vaccine  every 10 years  This vaccine protects you against tetanus and diphtheria  Tetanus is a severe infection that may cause painful muscle spasms and lockjaw  Diphtheria is a severe bacterial infection that causes a thick covering in the back of your mouth and throat  · Get a human papillomavirus (HPV) vaccine  if you are female and aged 23 to 32 or male 23 to 24 and never received it  This vaccine protects you from HPV infection  HPV is the most common infection spread by sexual contact  HPV may also cause vaginal, penile, and anal cancers  · Get a pneumococcal vaccine  if you are aged 72 years or older  The pneumococcal vaccine is an injection given to protect you from pneumococcal disease  Pneumococcal disease is an infection caused by pneumococcal bacteria  The infection may cause pneumonia, meningitis, or an ear infection  · Get a shingles vaccine  if you are 60 or older, even if you have had shingles before  The shingles vaccine is an injection to protect you from the varicella-zoster virus  This is the same virus that causes chickenpox  Shingles is a painful rash that develops in people who had chickenpox or have been exposed to the virus  How to eat healthy:  My Plate is a model for planning healthy meals  It shows the types and amounts of foods that should go on your plate  Fruits and vegetables make up about half of your plate, and grains and protein make up the other half   A serving of dairy is included on the side of your plate  The amount of calories and serving sizes you need depends on your age, gender, weight, and height  Examples of healthy foods are listed below:  · Eat a variety of vegetables  such as dark green, red, and orange vegetables  You can also include canned vegetables low in sodium (salt) and frozen vegetables without added butter or sauces  · Eat a variety of fresh fruits , canned fruit in 100% juice, frozen fruit, and dried fruit  · Include whole grains  At least half of the grains you eat should be whole grains  Examples include whole-wheat bread, wheat pasta, brown rice, and whole-grain cereals such as oatmeal     · Eat a variety of protein foods such as seafood (fish and shellfish), lean meat, and poultry without skin (turkey and chicken)  Examples of lean meats include pork leg, shoulder, or tenderloin, and beef round, sirloin, tenderloin, and extra lean ground beef  Other protein foods include eggs and egg substitutes, beans, peas, soy products, nuts, and seeds  · Choose low-fat dairy products such as skim or 1% milk or low-fat yogurt, cheese, and cottage cheese  · Limit unhealthy fats  such as butter, hard margarine, and shortening  Exercise:  Exercise at least 30 minutes per day on most days of the week  Some examples of exercise include walking, biking, dancing, and swimming  You can also fit in more physical activity by taking the stairs instead of the elevator or parking farther away from stores  Include muscle strengthening activities 2 days each week  Regular exercise provides many health benefits  It helps you manage your weight, and decreases your risk for type 2 diabetes, heart disease, stroke, and high blood pressure  Exercise can also help improve your mood  Ask your healthcare provider about the best exercise plan for you  General health and safety guidelines:   · Do not smoke    Nicotine and other chemicals in cigarettes and cigars can cause lung damage  Ask your healthcare provider for information if you currently smoke and need help to quit  E-cigarettes or smokeless tobacco still contain nicotine  Talk to your healthcare provider before you use these products  · Limit alcohol  A drink of alcohol is 12 ounces of beer, 5 ounces of wine, or 1½ ounces of liquor  · Lose weight, if needed  Being overweight increases your risk of certain health conditions  These include heart disease, high blood pressure, type 2 diabetes, and certain types of cancer  · Protect your skin  Do not sunbathe or use tanning beds  Use sunscreen with a SPF 15 or higher  Apply sunscreen at least 15 minutes before you go outside  Reapply sunscreen every 2 hours  Wear protective clothing, hats, and sunglasses when you are outside  · Drive safely  Always wear your seatbelt  Make sure everyone in your car wears a seatbelt  A seatbelt can save your life if you are in an accident  Do not use your cell phone when you are driving  This could distract you and cause an accident  Pull over if you need to make a call or send a text message  · Practice safe sex  Use latex condoms if are sexually active and have more than one partner  Your healthcare provider may recommend screening tests for sexually transmitted infections (STIs)  · Wear helmets, lifejackets, and protective gear  Always wear a helmet when you ride a bike or motorcycle, go skiing, or play sports that could cause a head injury  Wear protective equipment when you play sports  Wear a lifejacket when you are on a boat or doing water sports  © Copyright [address] Information is for End User's use only and may not be sold, redistributed or otherwise used for commercial purposes  All illustrations and images included in CareNotes® are the copyrighted property of [employer]  or [facility] Kiesha Márquez   The above information is an  only   It is not intended as medical advice for individual conditions or treatments  Talk to your doctor, nurse or pharmacist before following any medical regimen to see if it is safe and effective for you

## 2021-06-01 NOTE — PROGRESS NOTES
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[facility] )    Relevant Medications    FLUoxetine (PROzac) 40 MG capsule    Medicare annual wellness visit, subsequent - Primary           Preventive health issues were discussed with patient, and age appropriate screening tests were ordered as noted in patient's After Visit Summary  Personalized health advice and appropriate referrals for health education or preventive services given if needed, as noted in patient's After Visit Summary       History of Present Illness:     Patient presents for Medicare Annual Wellness visit    Patient Care Team:  Gardenia Tadeo MD as PCP - General  Gardenia Tadeo MD     Problem List:     Patient Active Problem List   Diagnosis    Posadas esophagus    Constipation    Degeneration, intervertebral disc, thoracic    Erectile dysfunction of non-organic origin    Esophagitis, reflux    Mixed hyperlipidemia    Lumbar radiculopathy    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[facility])      Past Medical and Surgical History:     Past Medical History:   Diagnosis Date    Anxiety     Cancer ([address] )     Depression     ED (erectile dysfunction)     GERD (gastroesophageal reflux disease)     OCD (obsessive compulsive disorder)      Past Surgical History:   Procedure Laterality Date    COLONOSCOPY  10/12/2017     unremarkable  A 10 year recall advised   HERNIA REPAIR      TONSILECTOMY AND ADNOIDECTOMY        Family History:     Family History   Problem Relation Age of Onset   [employer] Depression Father     Hypertension Mother     Colon cancer Neg Hx     Colon polyps Neg Hx       Social History:     E-Cigarette/Vaping    E-Cigarette Use Never User      E-Cigarette/Vaping Substances    Nicotine No     THC No     CBD No     Flavoring No     Other No     Unknown No      Social History     Socioeconomic History    Marital status: Single     Spouse name: None    Number of children: None    Years of education: None    Highest education level: None   Occupational History    None   Tobacco Use    Smoking status: Former Smoker    Smokeless tobacco: Never Used   Vaping Use    Vaping Use: Never used   Substance and Sexual Activity    Alcohol use: Yes     Comment: Rarely    Drug use: No    Sexual activity: None   Other Topics Concern    None   Social History Narrative    Always uses seat belt - Denied     Social Determinants of Health     Financial Resource Strain:     Difficulty of Paying Living Expenses:    Food Insecurity:     Worried About Running Out of Food in the Last Year:     Ran Out of Food in the Last Year:    Transportation Needs:     Lack of Transportation (Medical):      Lack of Transportation (Non-Medical):    Physical Activity:     Days of Exercise per Week:     Minutes of Exercise per Session:    Stress:     Feeling of Stress :    Social Connections:     Frequency of Communication with Friends and Family:     Frequency of Social Gatherings with Friends and Family:     Attends Restoration[employer] Services:     Active Member of Clubs or Organizations:     Attends Club or Organization Meetings:     Marital Status:    Intimate Partner Violence:     Fear of Current or Ex-Partner:     Emotionally Abused:     Physically Abused:     Sexually Abused:       Medications and Allergies:     Current Outpatient Medications   Medication Sig Dispense Refill    amLODIPine (NORVASC) 5 mg tablet TAKE 1 TABLET BY MOUTH TWICE A  tablet 1    FLUoxetine (PROzac) 20 mg capsule Take 20 mg by mouth daily  4    imipramine (TOFRANIL) 50 mg tablet Take 50 mg by mouth daily at bedtime as needed  4    Linzess 290 MCG CAPS TAKE 1 CAPSULE BY MOUTH EVERY DAY 30 capsule 11    perphenazine 4 mg tablet Take 4 mg by mouth daily at bedtime  4    traZODone (DESYREL) 150 mg tablet Take 150 mg by mouth daily at bedtime as needed  4    zolpidem (AMBIEN) 10 mg tablet Take by mouth      bicalutamide (CASODEX) 50 mg tablet Take 1 tablet by mouth Daily       famotidine (PEPCID) 20 mg tablet Take by mouth      FLUoxetine (PROzac) 40 MG capsule       sildenafil (VIAGRA) 100 mg tablet Take by mouth      valACYclovir (VALTREX) 500 mg tablet Take 1 tablet (500 mg total) by mouth 2 (two) times a day for 5 days 10 tablet 0     No current facility-administered medications for this visit       No Known Allergies   Immunizations:     Immunization History   Administered Date(s) Administered    INFLUENZA 12/04/2017    Influenza Split High Dose Preservative Free IM 12/02/2013, 12/01/2014, 11/30/2015, 09/07/2016, 12/04/2017    Influenza, high dose seasonal 0 7 mL 11/02/2018, 12/03/2019, 12/01/2020    Pneumococcal Conjugate 13-Valent 11/30/2015    Pneumococcal Polysaccharide PPV23 06/04/2014    Zoster 05/29/2013      Health Maintenance:         Topic Date Due    Colorectal Cancer Screening  07/12/2027    Hepatitis C Screening  Completed         Topic Date Due    COVID-19 Vaccine (1) Never done Medicare Health Risk Assessment:     /72   Pulse 70   Temp (!) 96 9 °F (36 1 °C)   Ht 5' 9 5" (1 765 m)   Wt 77 6 kg (171 lb)   BMI 24 89 kg/m²      Blanca Gore is here for his Subsequent Wellness visit  Last Medicare Wellness visit information reviewed, patient interviewed and updates made to the record today  Health Risk Assessment:   Patient rates overall health as very good  Patient feels that their physical health rating is same  Patient is satisfied with their life  Eyesight was rated as same  Hearing was rated as same  Patient feels that their emotional and mental health rating is same  Patients states they are sometimes angry  Patient states they are sometimes unusually tired/fatigued  Pain experienced in the last 7 days has been none  Patient states that he has experienced no weight loss or gain in last 6 months  Depression Screening:   PHQ-2 Score: 0      Fall Risk Screening: In the past year, patient has experienced: no history of falling in past year      Home Safety:  Patient does not have trouble with stairs inside or outside of their home  Patient has working smoke alarms and has working carbon monoxide detector  Home safety hazards include: none  Nutrition:   Current diet is Regular  Medications:   Patient is currently taking over-the-counter supplements  OTC medications include: see medication list  Patient is able to manage medications  Activities of Daily Living (ADLs)/Instrumental Activities of Daily Living (IADLs):   Walk and transfer into and out of bed and chair?: Yes  Dress and groom yourself?: Yes    Bathe or shower yourself?: Yes    Feed yourself?  Yes  Do your laundry/housekeeping?: Yes  Manage your money, pay your bills and track your expenses?: Yes  Make your own meals?: Yes    Do your own shopping?: Yes    Previous Hospitalizations:   Any hospitalizations or ED visits within the last 12 months?: No      Advance Care Planning:   Living will: No    Durable POA for healthcare: No    Advanced directive: No    Advanced directive counseling given: No    Five wishes given: No    Patient declined ACP directive: No    End of Life Decisions reviewed with patient: Yes    Provider agrees with end of life decisions: Yes      Cognitive Screening:   Provider or family/friend/caregiver concerned regarding cognition?: No    PREVENTIVE SCREENINGS      Cardiovascular Screening:    General: Screening Not Indicated and History Lipid Disorder      Diabetes Screening:     General: Screening Current      Colorectal Cancer Screening:     General: Screening Current      Prostate Cancer Screening:    General: History Prostate Cancer      Osteoporosis Screening:    General: Screening Not Indicated      Abdominal Aortic Aneurysm (AAA) Screening:    Risk factors include: age between 73-67 yo and tobacco use        General: Screening Not Indicated      Lung Cancer Screening:     General: Screening Not Indicated      Hepatitis C Screening:    General: Screening Current    Screening, Brief Intervention, and Referral to Treatment (SBIRT)    Screening  Typical number of drinks in a day: 0  Typical number of drinks in a week: 0  Interpretation: Low risk drinking behavior      Single Item Drug Screening:  How often have you used an illegal drug (including marijuana) or a prescription medication for non-medical reasons in the past year? never    Single Item Drug Screen Score: 0  Interpretation: Negative screen for possible drug use disorder      Garry Cobian MD

## 2021-06-01 NOTE — PROGRESS NOTES
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[facility] )  Comments:  Stable per psychiatry medications    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[facility] )    Other orders  -     FLUoxetine (PROzac) 40 MG capsule          Subjective:      Patient ID: Jake Swenson is a 77 y o  male  6 month follow up  No recent illness or injury    Still slowly clearing out the home he shared with his mother  His brother has purchased it  No COVID concern  Feeling generally well  Had COVID vaccine at [facility] mid June  Working with Nohemi Wilkins from [employer] through virtual visits    GERD symptoms well controlled on Famotidine    Medication list reviewed and revised      The following portions of the patient's history were reviewed and updated as appropriate: allergies, current medications, past family history, past medical history, past social history, past surgical history and problem list     Review of Systems   Constitutional: Negative for fatigue and unexpected weight change  HENT: Negative  Eyes: Negative for visual disturbance  Respiratory: Negative  Cardiovascular: Negative  Gastrointestinal: Negative  Genitourinary: Negative  Musculoskeletal: Negative  Skin: Negative  Neurological: Negative  Hematological: Negative  Psychiatric/Behavioral: Positive for dysphoric mood and sleep disturbance  The patient is nervous/anxious  All other systems reviewed and are negative  Objective:      /72   Pulse 70   Temp (!) 96 9 °F (36 1 °C)   Ht 5' 9 5" (1 765 m)   Wt 77 6 kg (171 lb)   BMI 24 89 kg/m²          Physical Exam  Vitals and nursing note reviewed  Constitutional:       Appearance: Normal appearance  HENT:      Head: Normocephalic and atraumatic  Neck:      Vascular: No carotid bruit  Cardiovascular:      Rate and Rhythm: Normal rate and regular rhythm  Pulses: Normal pulses  Heart sounds: Normal heart sounds  Pulmonary:      Effort: Pulmonary effort is normal       Breath sounds: Normal breath sounds  Musculoskeletal:      Right lower leg: No edema  Left lower leg: No edema  Lymphadenopathy:      Cervical: No cervical adenopathy  Skin:     Findings: No rash  Neurological:      General: No focal deficit present  Mental Status: He is alert and oriented to person, place, and time  Psychiatric:         Mood and Affect: Mood normal          Behavior: Behavior normal          Thought Content:  Thought content normal          Judgment: Judgment normal

## 2021-09-13 DIAGNOSIS — K58.9 IRRITABLE BOWEL SYNDROME, UNSPECIFIED TYPE: ICD-10-CM

## 2021-09-13 RX ORDER — LINACLOTIDE 290 UG/1
CAPSULE, GELATIN COATED ORAL
Qty: 30 CAPSULE | Refills: 11 | Status: SHIPPED | OUTPATIENT
Start: 2021-09-13

## 2021-11-10 DIAGNOSIS — I10 ESSENTIAL HYPERTENSION: ICD-10-CM

## 2021-11-10 RX ORDER — AMLODIPINE BESYLATE 5 MG/1
TABLET ORAL
Qty: 180 TABLET | Refills: 1 | Status: SHIPPED | OUTPATIENT
Start: 2021-11-10 | End: 2022-03-04

## 2021-12-01 ENCOUNTER — RA CDI HCC (OUTPATIENT)
Dept: OTHER | Facility: HOSPITAL | Age: 76
End: 2021-12-01

## 2021-12-03 ENCOUNTER — LAB (OUTPATIENT)
Dept: LAB | Facility: HOSPITAL | Age: 76
End: 2021-12-03
Payer: MEDICARE

## 2021-12-03 DIAGNOSIS — E78.2 MIXED HYPERLIPIDEMIA: ICD-10-CM

## 2021-12-03 DIAGNOSIS — I10 ESSENTIAL HYPERTENSION: ICD-10-CM

## 2021-12-03 DIAGNOSIS — C61 PROSTATE CANCER (HCC): ICD-10-CM

## 2021-12-03 LAB
ALBUMIN SERPL BCP-MCNC: 3.3 G/DL (ref 3.5–5)
ALP SERPL-CCNC: 148 U/L (ref 46–116)
ALT SERPL W P-5'-P-CCNC: 23 U/L (ref 12–78)
ANION GAP SERPL CALCULATED.3IONS-SCNC: 7 MMOL/L (ref 4–13)
AST SERPL W P-5'-P-CCNC: 14 U/L (ref 5–45)
BILIRUB SERPL-MCNC: 0.51 MG/DL (ref 0.2–1)
BUN SERPL-MCNC: 16 MG/DL (ref 5–25)
CALCIUM ALBUM COR SERPL-MCNC: 9.1 MG/DL (ref 8.3–10.1)
CALCIUM SERPL-MCNC: 8.5 MG/DL (ref 8.3–10.1)
CHLORIDE SERPL-SCNC: 107 MMOL/L (ref 100–108)
CHOLEST SERPL-MCNC: 180 MG/DL
CO2 SERPL-SCNC: 26 MMOL/L (ref 21–32)
CREAT SERPL-MCNC: 1.17 MG/DL (ref 0.6–1.3)
ERYTHROCYTE [DISTWIDTH] IN BLOOD BY AUTOMATED COUNT: 12.6 % (ref 11.6–15.1)
GFR SERPL CREATININE-BSD FRML MDRD: 61 ML/MIN/1.73SQ M
GLUCOSE P FAST SERPL-MCNC: 91 MG/DL (ref 65–99)
HCT VFR BLD AUTO: 35.1 % (ref 36.5–49.3)
HDLC SERPL-MCNC: 49 MG/DL
HGB BLD-MCNC: 11.3 G/DL (ref 12–17)
LDLC SERPL CALC-MCNC: 113 MG/DL (ref 0–100)
MCH RBC QN AUTO: 28.7 PG (ref 26.8–34.3)
MCHC RBC AUTO-ENTMCNC: 32.2 G/DL (ref 31.4–37.4)
MCV RBC AUTO: 89 FL (ref 82–98)
PLATELET # BLD AUTO: 254 THOUSANDS/UL (ref 149–390)
PMV BLD AUTO: 11.5 FL (ref 8.9–12.7)
POTASSIUM SERPL-SCNC: 3.5 MMOL/L (ref 3.5–5.3)
PROT SERPL-MCNC: 7.1 G/DL (ref 6.4–8.2)
PSA SERPL-MCNC: 0.1 NG/ML (ref 0–4)
RBC # BLD AUTO: 3.94 MILLION/UL (ref 3.88–5.62)
SODIUM SERPL-SCNC: 140 MMOL/L (ref 136–145)
TRIGL SERPL-MCNC: 92 MG/DL
WBC # BLD AUTO: 7.86 THOUSAND/UL (ref 4.31–10.16)

## 2021-12-03 PROCEDURE — 85027 COMPLETE CBC AUTOMATED: CPT

## 2021-12-03 PROCEDURE — 84153 ASSAY OF PSA TOTAL: CPT

## 2021-12-03 PROCEDURE — 36415 COLL VENOUS BLD VENIPUNCTURE: CPT

## 2021-12-03 PROCEDURE — 80053 COMPREHEN METABOLIC PANEL: CPT

## 2021-12-03 PROCEDURE — 80061 LIPID PANEL: CPT

## 2021-12-07 ENCOUNTER — OFFICE VISIT (OUTPATIENT)
Dept: FAMILY MEDICINE CLINIC | Facility: HOSPITAL | Age: 76
End: 2021-12-07
Payer: MEDICARE

## 2021-12-07 VITALS
OXYGEN SATURATION: 99 % | DIASTOLIC BLOOD PRESSURE: 80 MMHG | HEART RATE: 77 BPM | HEIGHT: 70 IN | SYSTOLIC BLOOD PRESSURE: 140 MMHG | BODY MASS INDEX: 24.48 KG/M2 | WEIGHT: 171 LBS

## 2021-12-07 DIAGNOSIS — G21.19 PARKINSONISM DUE TO DRUGS (HCC): ICD-10-CM

## 2021-12-07 DIAGNOSIS — Z23 ENCOUNTER FOR IMMUNIZATION: ICD-10-CM

## 2021-12-07 DIAGNOSIS — I10 ESSENTIAL HYPERTENSION: Primary | ICD-10-CM

## 2021-12-07 DIAGNOSIS — F60.5 OBSESSIVE COMPULSIVE PERSONALITY DISORDER (HCC): ICD-10-CM

## 2021-12-07 DIAGNOSIS — C61 PROSTATE CANCER (HCC): ICD-10-CM

## 2021-12-07 DIAGNOSIS — F80.0 SPEECH ARTICULATION DISORDER: ICD-10-CM

## 2021-12-07 DIAGNOSIS — R47.1 DYSARTHRIA: ICD-10-CM

## 2021-12-07 PROCEDURE — 90662 IIV NO PRSV INCREASED AG IM: CPT

## 2021-12-07 PROCEDURE — 99215 OFFICE O/P EST HI 40 MIN: CPT | Performed by: FAMILY MEDICINE

## 2021-12-07 PROCEDURE — G0008 ADMIN INFLUENZA VIRUS VAC: HCPCS

## 2022-01-03 ENCOUNTER — TELEPHONE (OUTPATIENT)
Dept: FAMILY MEDICINE CLINIC | Facility: HOSPITAL | Age: 77
End: 2022-01-03

## 2022-01-03 NOTE — TELEPHONE ENCOUNTER
Pt switched ins to [place] [id number]  Pre encounter center was informed of this late and are still waiting on approval of head ct   If they dont hear back from ins co by 4pm they will let the pt know

## 2022-01-12 ENCOUNTER — TELEPHONE (OUTPATIENT)
Dept: FAMILY MEDICINE CLINIC | Facility: HOSPITAL | Age: 77
End: 2022-01-12

## 2022-01-12 ENCOUNTER — HOSPITAL ENCOUNTER (OUTPATIENT)
Dept: CT IMAGING | Facility: HOSPITAL | Age: 77
Discharge: HOME | End: 2022-01-12
Payer: COMMERCIAL

## 2022-01-12 DIAGNOSIS — R47.1 DYSARTHRIA: Primary | ICD-10-CM

## 2022-01-12 DIAGNOSIS — R47.1 DYSARTHRIA: ICD-10-CM

## 2022-01-12 PROCEDURE — 70498 CT ANGIOGRAPHY NECK: CPT

## 2022-01-12 PROCEDURE — 70496 CT ANGIOGRAPHY HEAD: CPT

## 2022-01-12 PROCEDURE — G1004 CDSM NDSC: HCPCS

## 2022-01-12 RX ADMIN — IOHEXOL 85 ML: 350 INJECTION, SOLUTION INTRAVENOUS at 12:45

## 2022-01-12 NOTE — TELEPHONE ENCOUNTER
Ask office to please call patient and find out if  if patient is coming back in for CTA head/neck today? Asking that office return call to [facility] in Cat Scan at [address] option 0   Test was changed from CT head/neck to CTA head/neck

## 2022-01-24 ENCOUNTER — TELEPHONE (OUTPATIENT)
Dept: GASTROENTEROLOGY | Facility: CLINIC | Age: 77
End: 2022-01-24

## 2022-01-24 NOTE — TELEPHONE ENCOUNTER
[facility] required update on patient's [employer] to continue coverage  I faxed August 2020 ov noting patient due for 2 yr follow up August 2022  Gila Loja contacted office stating they require yearly notes  I reviewed he is due in August, she checked with her supervisor and they will extend coverage  They will require visit note faxed to her [phone] after August visit  Her phone #[phone]

## 2022-01-27 NOTE — TELEPHONE ENCOUNTER
[employer] approved [employer] coverage until 2/19/22  We must submit current office visit note - patient due in August for follow up

## 2022-02-07 ENCOUNTER — OFFICE VISIT (OUTPATIENT)
Dept: FAMILY MEDICINE CLINIC | Facility: HOSPITAL | Age: 77
End: 2022-02-07
Payer: COMMERCIAL

## 2022-02-07 VITALS
DIASTOLIC BLOOD PRESSURE: 70 MMHG | TEMPERATURE: 96.2 F | HEART RATE: 86 BPM | BODY MASS INDEX: 23.96 KG/M2 | SYSTOLIC BLOOD PRESSURE: 130 MMHG | WEIGHT: 167.4 LBS | OXYGEN SATURATION: 100 % | HEIGHT: 70 IN

## 2022-02-07 DIAGNOSIS — U07.1 COVID-19: Primary | ICD-10-CM

## 2022-02-07 PROCEDURE — 1036F TOBACCO NON-USER: CPT | Performed by: FAMILY MEDICINE

## 2022-02-07 PROCEDURE — 1160F RVW MEDS BY RX/DR IN RCRD: CPT | Performed by: FAMILY MEDICINE

## 2022-02-07 PROCEDURE — 99213 OFFICE O/P EST LOW 20 MIN: CPT | Performed by: FAMILY MEDICINE

## 2022-02-07 PROCEDURE — 3075F SYST BP GE 130 - 139MM HG: CPT | Performed by: FAMILY MEDICINE

## 2022-02-07 PROCEDURE — 3078F DIAST BP <80 MM HG: CPT | Performed by: FAMILY MEDICINE

## 2022-02-07 NOTE — PROGRESS NOTES
Assessment/Plan:         Diagnoses and all orders for this visit:    COVID-19  Comments:  Continue mask wearing for another 5 days  He is already beyond 5 days from testing and is asymptomatic          Subjective:      Patient ID: Esperanza Tinoco is a 77 y o  male  F/U on positive COVID test    Had a test given to him through the [employer] last week, took home test 1/31/2022 because he had several days of cold symptoms and congestion  Feels better today  Sneezing regularly  No fever  He had full immunization but not the booster  We did discuss waiting 90 days to get the booster      The following portions of the patient's history were reviewed and updated as appropriate: allergies, current medications, past family history, past medical history, past social history, past surgical history and problem list     Review of Systems   Constitutional: Negative for fever and unexpected weight change  HENT: Negative  Respiratory: Negative  Cardiovascular: Negative  Gastrointestinal: Negative  Musculoskeletal: Negative  All other systems reviewed and are negative  Objective:      /70 (BP Location: Left arm, Patient Position: Sitting, Cuff Size: Standard)   Pulse 86   Temp (!) 96 2 °F (35 7 °C) (Tympanic)   Ht 5' 9 5" (1 765 m)   Wt 75 9 kg (167 lb 6 4 oz)   SpO2 100%   BMI 24 37 kg/m²          Physical Exam  Vitals and nursing note reviewed  Constitutional:       Appearance: Normal appearance  Cardiovascular:      Rate and Rhythm: Normal rate and regular rhythm  Pulses: Normal pulses  Heart sounds: Normal heart sounds  Pulmonary:      Effort: Pulmonary effort is normal       Breath sounds: Normal breath sounds  Musculoskeletal:      Right lower leg: No edema  Left lower leg: No edema  Neurological:      Mental Status: He is alert

## 2022-03-04 DIAGNOSIS — I10 ESSENTIAL HYPERTENSION: ICD-10-CM

## 2022-03-04 RX ORDER — AMLODIPINE BESYLATE 5 MG/1
TABLET ORAL
Qty: 180 TABLET | Refills: 1 | Status: SHIPPED | OUTPATIENT
Start: 2022-03-04 | End: 2022-06-07 | Stop reason: SDUPTHER

## 2022-05-31 ENCOUNTER — RA CDI HCC (OUTPATIENT)
Dept: OTHER | Facility: HOSPITAL | Age: 77
End: 2022-05-31

## 2022-05-31 NOTE — PROGRESS NOTES
[facility]  coding opportunities       Chart reviewed, no opportunity found:   [address]        Patients Insurance     [place]ic[place] [facility]

## 2022-06-01 ENCOUNTER — LAB (OUTPATIENT)
Dept: LAB | Facility: HOSPITAL | Age: 77
End: 2022-06-01
Payer: COMMERCIAL

## 2022-06-01 DIAGNOSIS — C61 PROSTATE CANCER (HCC): ICD-10-CM

## 2022-06-01 LAB — PSA SERPL-MCNC: 0.1 NG/ML (ref 0–4)

## 2022-06-01 PROCEDURE — 84153 ASSAY OF PSA TOTAL: CPT

## 2022-06-07 ENCOUNTER — OFFICE VISIT (OUTPATIENT)
Dept: FAMILY MEDICINE CLINIC | Facility: HOSPITAL | Age: 77
End: 2022-06-07
Payer: COMMERCIAL

## 2022-06-07 VITALS
SYSTOLIC BLOOD PRESSURE: 118 MMHG | HEART RATE: 83 BPM | BODY MASS INDEX: 23.77 KG/M2 | WEIGHT: 166 LBS | DIASTOLIC BLOOD PRESSURE: 80 MMHG | TEMPERATURE: 96.4 F | OXYGEN SATURATION: 98 % | HEIGHT: 70 IN

## 2022-06-07 DIAGNOSIS — R42 VERTIGO: ICD-10-CM

## 2022-06-07 DIAGNOSIS — I10 ESSENTIAL HYPERTENSION: ICD-10-CM

## 2022-06-07 DIAGNOSIS — K22.70 BARRETT'S ESOPHAGUS WITHOUT DYSPLASIA: ICD-10-CM

## 2022-06-07 DIAGNOSIS — E78.2 MIXED HYPERLIPIDEMIA: ICD-10-CM

## 2022-06-07 DIAGNOSIS — G21.19 PARKINSONISM DUE TO DRUGS (HCC): ICD-10-CM

## 2022-06-07 DIAGNOSIS — Z00.00 MEDICARE ANNUAL WELLNESS VISIT, SUBSEQUENT: Primary | ICD-10-CM

## 2022-06-07 DIAGNOSIS — C61 PROSTATE CANCER (HCC): ICD-10-CM

## 2022-06-07 DIAGNOSIS — F60.5 OBSESSIVE COMPULSIVE PERSONALITY DISORDER (HCC): ICD-10-CM

## 2022-06-07 DIAGNOSIS — D50.0 IRON DEFICIENCY ANEMIA DUE TO CHRONIC BLOOD LOSS: ICD-10-CM

## 2022-06-07 PROBLEM — U07.1 COVID-19: Status: RESOLVED | Noted: 2022-02-07 | Resolved: 2022-06-07

## 2022-06-07 PROBLEM — Z23 NEED FOR INFLUENZA VACCINATION: Status: RESOLVED | Noted: 2018-11-02 | Resolved: 2022-06-07

## 2022-06-07 PROCEDURE — 1036F TOBACCO NON-USER: CPT | Performed by: FAMILY MEDICINE

## 2022-06-07 PROCEDURE — 1125F AMNT PAIN NOTED PAIN PRSNT: CPT | Performed by: FAMILY MEDICINE

## 2022-06-07 PROCEDURE — 3288F FALL RISK ASSESSMENT DOCD: CPT | Performed by: FAMILY MEDICINE

## 2022-06-07 PROCEDURE — 1160F RVW MEDS BY RX/DR IN RCRD: CPT | Performed by: FAMILY MEDICINE

## 2022-06-07 PROCEDURE — 3725F SCREEN DEPRESSION PERFORMED: CPT | Performed by: FAMILY MEDICINE

## 2022-06-07 PROCEDURE — 3079F DIAST BP 80-89 MM HG: CPT | Performed by: FAMILY MEDICINE

## 2022-06-07 PROCEDURE — G0439 PPPS, SUBSEQ VISIT: HCPCS | Performed by: FAMILY MEDICINE

## 2022-06-07 PROCEDURE — 3074F SYST BP LT 130 MM HG: CPT | Performed by: FAMILY MEDICINE

## 2022-06-07 PROCEDURE — 1170F FXNL STATUS ASSESSED: CPT | Performed by: FAMILY MEDICINE

## 2022-06-07 PROCEDURE — 99214 OFFICE O/P EST MOD 30 MIN: CPT | Performed by: FAMILY MEDICINE

## 2022-06-07 RX ORDER — AMLODIPINE BESYLATE 5 MG/1
5 TABLET ORAL 2 TIMES DAILY
Qty: 180 TABLET | Refills: 1 | Status: SHIPPED | OUTPATIENT
Start: 2022-06-07

## 2022-06-07 RX ORDER — AMLODIPINE BESYLATE 5 MG/1
5 TABLET ORAL 2 TIMES DAILY
Qty: 180 TABLET | Refills: 1 | Status: CANCELLED | OUTPATIENT
Start: 2022-06-07

## 2022-06-07 NOTE — PROGRESS NOTES
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[facility])       Genitourinary    Prostate cancer ([facility] )    Relevant Orders    PSA Total, Diagnostic       Other    Mixed hyperlipidemia    Relevant Orders    Lipid Panel with Direct LDL reflex    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[address] )    Medicare annual wellness visit, subsequent - Primary    Iron deficiency anemia due to chronic blood loss    Relevant Orders    CBC and differential    Vertigo    Relevant Orders    Ambulatory Referral to Physical Therapy          Depression Screening and Follow-up Plan: Patient was screened for depression during today's encounter  They screened negative with a PHQ-2 score of 1  Falls Plan of Care: balance, strength, and gait training instructions were provided and referral to physical therapy  Preventive health issues were discussed with patient, and age appropriate screening tests were ordered as noted in patient's After Visit Summary  Personalized health advice and appropriate referrals for health education or preventive services given if needed, as noted in patient's After Visit Summary       History of Present Illness:     Patient presents for Medicare Annual Wellness visit    Patient Care Team:  Christy Allen MD as PCP - General  Christy Allen MD     Problem List:     Patient Active Problem List   Diagnosis    Posadas esophagus    Constipation    Degeneration, intervertebral disc, thoracic    Erectile dysfunction of non-organic origin    Esophagitis, reflux    Mixed hyperlipidemia    Lumbar radiculopathy    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[address] )    Osteopenia    Hemangioma of skin and subcutaneous tissue    Prostate cancer ([facility] )    Increased frequency of urination    Venous stasis dermatitis of left lower extremity    Essential hypertension    Medicare annual wellness visit, subsequent    Acute hemorrhagic otitis externa    Herpes simplex    Reactive otitis externa of left ear    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HCC)    Iron deficiency anemia due to chronic blood loss    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[facility] )    Speech articulation disorder    Vertigo      Past Medical and Surgical History:     Past Medical History:   Diagnosis Date    Anxiety     Cancer ([place] [address] )     Depression     ED (erectile dysfunction)     GERD (gastroesophageal reflux disease)     OCD (obsessive compulsive disorder)      Past Surgical History:   Procedure Laterality Date    COLONOSCOPY  10/12/2017     unremarkable  A 10 year recall advised      HERNIA REPAIR      TONSILECTOMY AND ADNOIDECTOMY        Family History:     Family History   Problem Relation Age of Onset   Ethelyn [place] Depression Father     Hypertension Mother     Colon cancer Neg Hx     Colon polyps Neg Hx       Social History:     Social History     Socioeconomic History    Marital status: Single     Spouse name: None    Number of children: None    Years of education: None    Highest education level: None   Occupational History    None   Tobacco Use    Smoking status: Former Smoker    Smokeless tobacco: Never Used   Vaping Use    Vaping Use: Never used   Substance and Sexual Activity    Alcohol use: Yes     Comment: Rarely    Drug use: No    Sexual activity: None   Other Topics Concern    None   Social History Narrative    Always uses seat belt - Denied     Social Determinants of Health     Financial Resource Strain: Not on file   Food Insecurity: Not on file   Transportation Needs: Not on file   Physical Activity: Not on file   Stress: Not on file   Social Connections: Not on file   Intimate Partner Violence: Not on file   Housing Stability: Not on file      Medications and Allergies:     Current Outpatient Medications   Medication Sig Dispense Refill    amLODIPine (NORVASC) 5 mg tablet Take 1 tablet (5 mg total) by mouth 2 (two) times a day 180 tablet 1    FLUoxetine (PROzac) 20 mg capsule Take 20 mg by mouth daily  4    imipramine (TOFRANIL) 50 mg tablet Take 50 mg by mouth daily at bedtime as needed  4    Linzess 290 MCG CAPS TAKE 1 CAPSULE BY MOUTH EVERY DAY 30 capsule 11    perphenazine 4 mg tablet Take 4 mg by mouth daily at bedtime  4    traZODone (DESYREL) 150 mg tablet Take 150 mg by mouth daily at bedtime as needed  4    zolpidem (AMBIEN) 10 mg tablet Take by mouth      valACYclovir (VALTREX) 500 mg tablet Take 1 tablet (500 mg total) by mouth 2 (two) times a day for 5 days 10 tablet 0     No current facility-administered medications for this visit  No Known Allergies   Immunizations:     Immunization History   Administered Date(s) Administered    INFLUENZA 12/04/2017    Influenza Split High Dose Preservative Free IM 12/02/2013, 12/01/2014, 11/30/2015, 09/07/2016, 12/04/2017    Influenza, high dose seasonal 0 7 mL 11/02/2018, 12/03/2019, 12/01/2020, 12/07/2021    Pneumococcal Conjugate 13-Valent 11/30/2015    Pneumococcal Polysaccharide PPV23 06/04/2014    Zoster 05/29/2013      Health Maintenance:         Topic Date Due    Colorectal Cancer Screening  07/12/2027    Hepatitis C Screening  Completed         Topic Date Due    COVID-19 Vaccine (1) Never done    DTaP,Tdap,and Td Vaccines (1 - Tdap) Never done      Medicare Health Risk Assessment:     /80 (BP Location: Left arm, Patient Position: Sitting, Cuff Size: Standard)   Pulse 83   Temp (!) 96 4 °F (35 8 °C) (Tympanic)   Ht 5' 9 5" (1 765 m)   Wt 75 3 kg (166 lb)   SpO2 98%   BMI 24 16 kg/m²      Todd House is here for his Subsequent Wellness visit   Last Medicare Wellness visit information reviewed, patient interviewed and updates made to the record today       Health Risk Assessment:   Patient rates overall health as fair  Patient feels that their physical health rating is same  Patient is satisfied with their life  Eyesight was rated as same  Hearing was rated as same  Patient feels that their emotional and mental health rating is same  Patients states they are never, rarely angry  Patient states they are sometimes unusually tired/fatigued  Pain experienced in the last 7 days has been some  Patient's pain rating has been 3/10  Patient states that he has experienced no weight loss or gain in last 6 months  Right shoulder blade(fell April 12, injured ribs and shoulder blade)    Depression Screening:   PHQ-2 Score: 1      Fall Risk Screening: In the past year, patient has experienced: history of falling in past year    Number of falls: 1  Injured during fall?: Yes    Feels unsteady when standing or walking?: Yes    Worried about falling?: Yes      Home Safety:  Patient does not have trouble with stairs inside or outside of their home  Patient has working smoke alarms and has no working carbon monoxide detector  Home safety hazards include: none  Nutrition:   Current diet is Regular  Medications:   Patient is not currently taking any over-the-counter supplements  Patient is able to manage medications  Activities of Daily Living (ADLs)/Instrumental Activities of Daily Living (IADLs):   Walk and transfer into and out of bed and chair?: Yes  Dress and groom yourself?: Yes    Bathe or shower yourself?: Yes    Feed yourself? Yes  Do your laundry/housekeeping?: Yes  Manage your money, pay your bills and track your expenses?: Yes  Make your own meals?: Yes    Do your own shopping?: Yes    Previous Hospitalizations:   Any hospitalizations or ED visits within the last 12 months?: No      Advance Care Planning:   Living will: No    Durable POA for healthcare: No    Advanced directive: No    Advanced directive counseling given: Yes    Five wishes given:  Yes Patient declined ACP directive: No      Cognitive Screening:   Provider or family/friend/caregiver concerned regarding cognition?: No    PREVENTIVE SCREENINGS      Cardiovascular Screening:    General: Screening Not Indicated and History Lipid Disorder      Diabetes Screening:     General: Screening Current      Colorectal Cancer Screening:     General: Screening Current      Prostate Cancer Screening:    General: History Prostate Cancer and Screening Not Indicated      Osteoporosis Screening:    General: Risks and Benefits Discussed    Due for: DXA Axial      Abdominal Aortic Aneurysm (AAA) Screening:    Risk factors include: age between 73-67 yo and tobacco use        General: Screening Not Indicated      Lung Cancer Screening:     General: Screening Not Indicated      Hepatitis C Screening:    General: Screening Current    Screening, Brief Intervention, and Referral to Treatment (SBIRT)    Screening  Typical number of drinks in a day: 0  Typical number of drinks in a week: 0  Interpretation: Low risk drinking behavior      Single Item Drug Screening:  How often have you used an illegal drug (including marijuana) or a prescription medication for non-medical reasons in the past year? never    Single Item Drug Screen Score: 0  Interpretation: Negative screen for possible drug use disorder      Naheed Norman MD

## 2022-06-07 NOTE — PROGRESS NOTES
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[facility] )    Posadas's esophagus without dysplasia    Prostate cancer (HCC)  -     PSA Total, Diagnostic; Future    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[facility] )    Mixed hyperlipidemia  -     Lipid Panel with Direct LDL reflex; Future    Iron deficiency anemia due to chronic blood loss  -     CBC and differential; Future    Vertigo  -     Ambulatory Referral to Physical Therapy; Future    Essential hypertension  Comments:  Accelerated hypertension stabilized with Clonidine  Hopefully can achieve consistent control of BP with samples of Amlodipine 5 mg daily  Try to get a home BP  Orders:  -     amLODIPine (NORVASC) 5 mg tablet; Take 1 tablet (5 mg total) by mouth 2 (two) times a day  -     Comprehensive metabolic panel; Future  -     TSH, 3rd generation with Free T4 reflex; Future          Subjective:      Patient ID: Natan Ashley is a 77 y o  male  6 month follow up  Notes some difficulty with vertigo- had a fall and injured ribs and shoulder blade  Happened 4 weeks ago, just now doing better    Had another vertigo episode recently but not prolonged  Willing to have vestibular rehab    He is concerned about internal damage from the fall- I reassured he did not suffer such      Medications reviewed and list revised    PSA stable as follow up for prostate cancer      The following portions of the patient's history were reviewed and updated as appropriate: allergies, current medications, past family history, past medical history, past social history, past surgical history and problem list     Review of Systems      Objective:      /80 (BP Location: Left arm, Patient Position: Sitting, Cuff Size: Standard)   Pulse 83   Temp (!) 96 4 °F (35 8 °C) (Tympanic)   Ht 5' 9 5" (1 765 m)   Wt 75 3 kg (166 lb)   SpO2 98%   BMI 24 16 kg/m²          Physical Exam  Vitals reviewed  Neck:      Vascular: No carotid bruit  Cardiovascular:      Rate and Rhythm: Normal rate and regular rhythm  Pulses: Normal pulses  Heart sounds: Normal heart sounds  Pulmonary:      Effort: Pulmonary effort is normal       Breath sounds: Normal breath sounds  Musculoskeletal:      Right lower leg: No edema  Left lower leg: No edema  Skin:     Findings: No rash  Neurological:      General: No focal deficit present  Mental Status: He is alert and oriented to person, place, and time        Gait: Gait normal    Psychiatric:         Mood and Affect: Mood normal

## 2022-07-27 ENCOUNTER — EVALUATION (OUTPATIENT)
Dept: PHYSICAL THERAPY | Facility: CLINIC | Age: 77
End: 2022-07-27
Payer: COMMERCIAL

## 2022-07-27 DIAGNOSIS — R42 VERTIGO: Primary | ICD-10-CM

## 2022-07-27 PROCEDURE — 97162 PT EVAL MOD COMPLEX 30 MIN: CPT

## 2022-07-27 NOTE — PROGRESS NOTES
PT Evaluation     Today's date: 2022  Patient name: Nicholas Yap  : 1945  MRN: [id number]  Referring provider: Trae Burr MD  Dx:   Encounter Diagnosis     ICD-10-CM    1  Vertigo  R42        Start Time: 1115  Stop Time: 1148  Total time in clinic (min): 33 minutes    Assessment  Assessment details: Patient is a 77y o  year old male presenting to OPPT s/p diagnosis of vertigo  Limited evaluation performed due to patient arriving late  Patient demonstrates decreased strength, endurance, balance, and functional independence  Appears to be multifactorial in nature, as no positional testing reproduced his dizziness but did report some dizziness accompanied with imbalance during testing  Patient has experienced multiple falls recently, FGA places him in fall risk category 1830  Shows difficulty with reactive balance strategies  Will further assess dizziness at next visit  He will benefit from skilled PT interventions to maximize return to PLOF and independence as able  Thank you for this referral and the ability to participate in the care of this patient  Impairments: abnormal gait, activity intolerance, impaired balance, impaired physical strength, lacks appropriate home exercise program and safety issue     Prognosis: good    Goals  In 4 weeks, patient will:  1  Report improvement in dizziness by at least 25% in severity  2  Improve 5xSTS by at least 2 3 seconds to show improved LE strength to assist with transfers  3   Complete MiniBest to further assess balance    In 4-10 weeks, patient will:  1  Meet FOTO predicted score to demonstrate improvement in functional mobility  2   Demonstrate consistent carryover with HEP and walking program   3   Improve FGA by at least 3 points to show improving dynamic balance and reduced dizziness  4  Complete DHI and improve by at least 10 points to show improving dizziness symptoms        Plan  Patient would benefit from: skilled physical therapy  Planned therapy interventions: neuromuscular re-education, manual therapy, patient education, home exercise program, therapeutic exercise, therapeutic activities and gait training  Frequency: 2x week  Duration in weeks: 8  Plan of Care beginning date: 7/27/2022  Plan of Care expiration date: 9/25/2022  Treatment plan discussed with: patient        Subjective Evaluation    History of Present Illness  Mechanism of injury: Present at PT: Started in 2019, fell down in the tub while taking a shower and getting dizzy  Feels like he is going to fall and lose his balance  Has had a few falls recently  Most recent fall was getting out of bed and walking to his dresser, tried to grab on [employer]  Does not happen every time he gets in/out of bed  No dizziness when walking out in community or while driving  Feels like he has dizziness in his head and loses his balance  Most recent dizziness was associated with his recent fall  Last time before that was in April  He believes that he has parkinsons, when asked if he was following any doctors for it, he notes he was not  Feels like his blood pressures are good as far as he knows  Pain  No pain reported    Social Support  Steps to enter house: no  Stairs in house: no   Lives in: apartment  Lives with: alone      Diagnostic Tests  No diagnostic tests performed  Treatments  No previous or current treatments  Patient Goals  Patient goals for therapy: improved balance  Patient goal: "prevent falls and get rid of dizziness"        Objective     Concurrent Complaints  Negative for headaches, nausea/motion sickness, tinnitus, visual change and hearing loss    Neurological Testing     Additional Neurological Details  Displays LUE tremor   Appears to be rigid around ankles    Strength/Myotome Testing     Left Hip   Planes of Motion   Flexion: 4  Extension: 4  Abduction: 4    Right Hip   Planes of Motion   Flexion: 4  Extension: 4  Abduction: 4    Left Knee   Extension: 4    Right Knee   Extension: 4    Left Ankle/Foot   Dorsiflexion: 4    Right Ankle/Foot   Dorsiflexion: 4  Neuro Exam:     Dizziness  Positive for disequilibrium and vertigo  Negative for oscillopsia, light-headedness and diplopia       Headaches   Patient reports headaches: No      Cervical exam   Modified VBI   Left: asymptomatic  Right: asymptomatic  Seated posture: forward head posture    Oculomotor exam   Oculomotor ROM: WNL  Resting nystagmus: not present   Gaze holding nystagmus: not present left  and not present right  Smooth pursuits: saccadic smooth pursuit  Vertical saccades: hypometric and Slowed  Horizontal saccades: hypometric  and slowed  Cover test: normal    Positional testing   Leandra-Hallpike   Left posterior canal: WNL  Right posterior canal: WNL  Leandra-Hallpike comment: (-) sidelying test    Sensation   Light touch LE: left WNL and right WNL    Coordination   Finger to nose: left WNL  Rapid alternating movements: UE WNL and LE impaired    Functional outcomes   5x sit to stand: 14 69 seconds (seconds)  Functional outcome comment: 3 steps to catch posterior pull  Functional outcome gait comment: Stooped posturing, slightly crouched gait, heel strike @ initial contact, wider base of support             Precautions: Hx of falls, Hx of osteopenia  Re-eval Date: 8/27/2022    Date 7/27       Visit Count 1       FOTO See IE       Pain In See IE       Pain Out                Testing        TUG        5xSTS 14 69       Gait speed        FGA 18       2/6MWT        Neuro Re-Ed        Dynamic balance        Static balance        Obstacle course        VORx1        VORx1 Vergence                        Ther Ex                                                                        Ther Activity        ADL                Gait Training        Divided Attention        Head turns        Modalities         prn

## 2022-08-03 ENCOUNTER — OFFICE VISIT (OUTPATIENT)
Dept: PHYSICAL THERAPY | Facility: CLINIC | Age: 77
End: 2022-08-03
Payer: COMMERCIAL

## 2022-08-03 DIAGNOSIS — R42 VERTIGO: Primary | ICD-10-CM

## 2022-08-03 PROCEDURE — 97112 NEUROMUSCULAR REEDUCATION: CPT

## 2022-08-03 NOTE — PROGRESS NOTES
Daily Note     Today's date: 8/3/2022  Patient name: Clarissa Fuentes  : 1945  MRN: [id number]  Referring provider: Shashi Lee MD  Dx:   Encounter Diagnosis     ICD-10-CM    1  Vertigo  R42        Start Time: 1500  Stop Time: 1545  Total time in clinic (min): 45 minutes    Subjective: Denies dizziness since IE  Has been having some trouble with the balance exercises with walking in a straight line while moving his head  No near falls or falls since IE  Seems like dizziness comes with panic and when he is about to fall  Does not feel like room is spinning when it occurs  Feels like when he is more nervous his left arm shakes more  Objective: See treatment diary below  (-) melani-hallpike & roll test x2    Assessment: Pt continues to be free of positional dizziness  Multifactorial balance/dizziness causes  Does show high fall risk per MiniBest,   Shows difficulty with compliant surfaces and inclines as well as ambulatory balance  Difficulty with cog dual task during TUG  No reproduction of dizziness throughout session  Will continue to challenge patient's balance to attempt to reproduce dizziness  Updated HEP for further balance training and LE strengthening  Patient would benefit from continued PT to reduce dizziness and improve balance  Plan: Continue per plan of care  Balance training  Reproduce dizziness  Add compliant and uneven surfaces       Precautions: Hx of falls, Hx of osteopenia  Re-eval Date: 2022    Date 7/27 8/3      Visit Count 1 2      FOTO See IE       Pain In See IE       Pain Out                Testing        TUG 8  92 TUGC       5xSTS 14 69       DHI 20       FGA 18       MiniBest 17       Neuro Re-Ed  MiniBest, DHI      Dynamic balance  Gait HT 20ft x4 cues for continuing gait      Static balance  HT foam FA wall touch 10" x4      Obstacle course        VORx1        VORx1 Vergence        Uneven surfaces                Ther Ex Ther Activity        ADL                Gait Training        Divided Attention        Head turns        Modalities         prn

## 2022-08-10 ENCOUNTER — OFFICE VISIT (OUTPATIENT)
Dept: PHYSICAL THERAPY | Facility: CLINIC | Age: 77
End: 2022-08-10
Payer: COMMERCIAL

## 2022-08-10 DIAGNOSIS — R42 VERTIGO: Primary | ICD-10-CM

## 2022-08-10 PROCEDURE — 97112 NEUROMUSCULAR REEDUCATION: CPT

## 2022-08-10 NOTE — PROGRESS NOTES
Daily Note     Today's date: 8/10/2022  Patient name: Todd Chairez  : 1945  MRN: [id number]  Referring provider: Cayetano Kinney MD  Dx:   Encounter Diagnosis     ICD-10-CM    1  Vertigo  R42        Start Time: 85  Stop Time: 1230  Total time in clinic (min): 45 minutes    Subjective: Had mild episode when getting up from couch and going to kitchen  Had to grab onto something to steady himself  Passed quickly  Very mild nothing like it was in the past       Objective: See treatment diary below  (-) melani-hallpike with bridge x1    Assessment:  Trials of taking positional blood pressure with no reproduction of dizziness  Remains free from positional dizziness  No reproduction of dizziness during session  However, shows imbalance with head turns and gait and with reduced visual cues  Severely slow and unsteady gait with eyes closed  Updated HEP to include gaze stability exercises  Minimal imbalance with completing hurdles with step through pattern  Patient would benefit from continued PT to reduce dizziness and improve balance  Plan: Continue per plan of care  Balance training  Reproduce dizziness  Add compliant and uneven surfaces       Precautions: Hx of falls, Hx of osteopenia  Re-eval Date: 2022    Date 7/27 8/3 8/10     Visit Count 1 2 3     FOTO See IE       Pain In See IE       Pain Out                Testing        TUG 8  92 TUGC       5xSTS 14 69       DHI 20       FGA 18       MiniBest 17       Neuro Re-Ed  MiniBest, DHI      Dynamic balance  Gait HT 20ft x4 cues for continuing gait Gait HT/HN 20ft x4 cues for continuing gait CGA    EC gait 20ft x1  CGA     Static balance  HT foam FA wall touch 10" x4 EC firm FT 20" x3    HT/HN firm EO FT 20" x1     Obstacle course   Low hurdles 6 laps step through CGA     VORx1   30" x1      VORx1 Vergence        Uneven surfaces        [facility]    3x8     Ther Ex                                                                        Ther Activity        ADL                Gait Training        Divided Attention        Head turns        Modalities         prn

## 2022-08-17 ENCOUNTER — OFFICE VISIT (OUTPATIENT)
Dept: PHYSICAL THERAPY | Facility: CLINIC | Age: 77
End: 2022-08-17
Payer: COMMERCIAL

## 2022-08-17 DIAGNOSIS — R42 VERTIGO: Primary | ICD-10-CM

## 2022-08-17 PROCEDURE — 97112 NEUROMUSCULAR REEDUCATION: CPT

## 2022-08-17 NOTE — PROGRESS NOTES
Daily Note     Today's date: 2022  Patient name: Aryan Melendez  : 1945  MRN: [id number]  Referring provider: Shahzad Carnes MD  Dx:   Encounter Diagnosis     ICD-10-CM    1  Vertigo  R42        Start Time:   Stop Time:   Total time in clinic (min): 45 minutes    Subjective: Monday went to put compact disc into his music system  Felt the "feeling" again and lunged toward the sofa and sat  Worried he would fall  Once he sat the feeling passed  Exercises at home are not producing this feeling  Objective: See treatment diary below    Assessment:  Perturbation training did induce near loss of balance with patient ability to recover on his own in a few steps  However, did not reproduce same feeling patient experiences at home  With reaching outside base of support on foam shows moderate imbalance but able to recover balance both laterally and a-p directions  Slowed gait with head movement with difficulty coordinating head movements with steps  Particularly slow backward walking  Patient would benefit from continued PT to reduce dizziness and improve balance  Plan: Continue per plan of care  Balance training  Perturbations, uneven/compliant surfaces  Discussed return to PCP for follow up if needed in future       Precautions: Hx of falls, Hx of osteopenia  Re-eval Date: 2022    Date 7/27 8/3 8/10 8/17    Visit Count 1 2 3 4    FOTO See IE       Pain In See IE       Pain Out                Testing        TUG 8  92 TUGC       5xSTS 14 69       DHI 20       FGA 18       MiniBest 17       Neuro Re-Ed  MiniBest, DHI      Dynamic balance  Gait HT 20ft x4 cues for continuing gait Gait HT/HN 20ft x4 cues for continuing gait CGA    EC gait 20ft x1  CGA Gait HT 20ft x4 cues for cont gait    RTB perturbations solo step fwd walking 12 laps    Static balance  HT foam FA wall touch 10" x4 EC firm FT 20" x3    HT/HN firm EO FT 20" x1 EO foam solo step ball toss x30    Obstacle course Low hurdles 6 laps step through CGA High hurdles 4 laps step through CGA    VORx1   30" x1      VORx1 Vergence        Uneven surfaces    Lat steps compliant surface low hurdles 4 laps    Cone    3x8 2x8 upward reaching    Ther Ex                                                                        Ther Activity        ADL                Gait Training        Divided Attention        Head turns        Modalities         prn

## 2022-08-23 ENCOUNTER — OFFICE VISIT (OUTPATIENT)
Dept: GASTROENTEROLOGY | Facility: CLINIC | Age: 77
End: 2022-08-23
Payer: COMMERCIAL

## 2022-08-23 VITALS
BODY MASS INDEX: 23.62 KG/M2 | SYSTOLIC BLOOD PRESSURE: 140 MMHG | WEIGHT: 165 LBS | DIASTOLIC BLOOD PRESSURE: 80 MMHG | HEIGHT: 70 IN

## 2022-08-23 DIAGNOSIS — K58.1 IRRITABLE BOWEL SYNDROME WITH CONSTIPATION: ICD-10-CM

## 2022-08-23 DIAGNOSIS — K59.04 CHRONIC IDIOPATHIC CONSTIPATION: Primary | ICD-10-CM

## 2022-08-23 PROCEDURE — 3079F DIAST BP 80-89 MM HG: CPT | Performed by: INTERNAL MEDICINE

## 2022-08-23 PROCEDURE — 3077F SYST BP >= 140 MM HG: CPT | Performed by: INTERNAL MEDICINE

## 2022-08-23 PROCEDURE — 1160F RVW MEDS BY RX/DR IN RCRD: CPT | Performed by: INTERNAL MEDICINE

## 2022-08-23 PROCEDURE — 99213 OFFICE O/P EST LOW 20 MIN: CPT | Performed by: INTERNAL MEDICINE

## 2022-08-23 RX ORDER — LINACLOTIDE 290 UG/1
1 CAPSULE, GELATIN COATED ORAL DAILY
Qty: 30 CAPSULE | Refills: 11 | Status: SHIPPED | OUTPATIENT
Start: 2022-08-23

## 2022-08-23 NOTE — PROGRESS NOTES
[facility] - Outpatient Follow-up Note  Cecy Calderon 77 y o  male MRN: [id number]  Encounter: [id number]    ASSESSMENT AND PLAN:      1  Irritable bowel syndrome with constipation    -continue current medications of Linzess 290 mcg use and intermittent polyethylene glycol and titration for a soft bowel movement without straining on a regular basis  Encouraged increased water intake  Prior Authorization for Linzess (linaclotide)  We will prescribe Linzess 290mcg irritable bowel syndrome with constipation for greater than 6 months  The patient has tried increased fiber intake, and failed stimulant laxative of bisacodyl and senna, and osmotic laxatives of miralax  - linaCLOtide (Linzess) [address]; Take 1 capsule by mouth daily  Dispense: 30 capsule; Refill: 11      Follow up appointment:  1 year  ______________________________________________________________________    Chief complaint: constipation    HPI:   Patient is a 77 y o  male with a significant PMH of chronic idiopathic constipation presenting for follow up regarding has constipation    Patient has a long history of chronic idiopathic constipation  Normally goes about 4-5 days without having bowel without any medications  Currently is doing well with his Linzess 290 mcg daily and intermittent and polyethylene glycol use  Patient stated no reported lightheadedness, dizziness, fevers, chills, nausea, vomiting, dysphagia, heartburn, SOB, CP, abdominal pain, changes in bowel movement with diarrhea or constipation, GI bleeding, or unintentional weight loss  GI History:  Blood thinners: Denies ASA, antiplatelet, or anticoagulation  NSAID use: Denies    Social Hx: No regular ETOH, smoking, or drug use  Abdominal Surgical Hx: None  Family Hx: No first degree relatives with GI malignancies  GI procedure Hx:  Colonoscopy in 2017 with grade 1 internal hemorrhoids and mild radiation proctitis    EGD in 2006 with reflux and hyperplastic polyps  Are the the            Historical Information   Past Medical History:   Diagnosis Date    Anxiety     Cancer [facility])     Depression     ED (erectile dysfunction)     GERD (gastroesophageal reflux disease)     OCD (obsessive compulsive disorder)      Past Surgical History:   Procedure Laterality Date    COLONOSCOPY  10/12/2017     unremarkable  A 10 year recall advised   HERNIA REPAIR      TONSILECTOMY AND ADNOIDECTOMY       Social History     Substance and Sexual Activity   Alcohol Use Yes    Comment: Rarely     Social History     Substance and Sexual Activity   Drug Use No     Social History     Tobacco Use   Smoking Status Former Smoker   Smokeless Tobacco Never Used     Family History   Problem Relation Age of Onset    Depression Father     Hypertension Mother     Colon cancer Neg Hx     Colon polyps Neg Hx          Current Outpatient Medications:     amLODIPine (NORVASC) 5 mg tablet    FLUoxetine (PROzac) 20 mg capsule    imipramine (TOFRANIL) 50 mg tablet    linaCLOtide (Linzess) 290 MCG CAPS    perphenazine 4 mg tablet    traZODone (DESYREL) 150 mg tablet    zolpidem (AMBIEN) 10 mg tablet    valACYclovir (VALTREX) 500 mg tablet  No Known Allergies  Reviewed medications and allergies and updated as indicated    PHYSICAL EXAM:    Blood pressure 140/80, height 5' 9 5" (1 765 m), weight 74 8 kg (165 lb)  Body mass index is 24 02 kg/m²  General Appearance: NAD, cooperative, alert  Eyes: Anicteric, EOMI  ENT:  Normocephalic, atraumatic  Neck:  Supple, symmetrical, trachea midline  Resp:  Air entry present bilaterally  CV: S1, S2 present    GI:  Soft, non-tender, non-distended; normal bowel sounds; no masses, no organomegaly   Rectal: Deferred  Musculoskeletal: No cyanosis, clubbing or edema    Skin:  No jaundice, rashes, or lesions   Psych: Normal affect, good eye contact  Neuro: No gross deficits    Lab Results:   Lab Results   Component Value Date    WBC 7 86 12/03/2021    WBC 7 68 11/23/2020    WBC 7 42 08/03/2020    HGB 11 3 (L) 12/03/2021    HGB 12 9 11/23/2020    HGB 12 6 08/03/2020    MCV 89 12/03/2021     12/03/2021     11/23/2020     08/03/2020     Lab Results   Component Value Date     11/24/2015    K 3 5 12/03/2021     12/03/2021    CO2 26 12/03/2021    ANIONGAP 1 (L) 11/24/2015    BUN 16 12/03/2021    CREATININE 1 17 12/03/2021    GLUCOSE 81 11/24/2015    GLUF 91 12/03/2021    CALCIUM 8 5 12/03/2021    CORRECTEDCA 9 1 12/03/2021    AST 14 12/03/2021    AST 10 11/23/2020    AST 14 05/26/2020    ALT 23 12/03/2021    ALT 17 11/23/2020    ALT 23 05/26/2020    ALKPHOS 148 (H) 12/03/2021    ALKPHOS 131 (H) 11/23/2020    ALKPHOS 114 05/26/2020    PROT 7 2 11/24/2015    BILITOT 0 67 11/24/2015    BILITOT 0 5 11/25/2014    BILITOT 0 5 11/26/2013    EGFR 61 12/03/2021     Lab Results   Component Value Date    FERRITIN 13 08/03/2020     No results found for: LIPASE    Radiology Results:   No results found

## 2022-08-24 ENCOUNTER — OFFICE VISIT (OUTPATIENT)
Dept: PHYSICAL THERAPY | Facility: CLINIC | Age: 77
End: 2022-08-24
Payer: COMMERCIAL

## 2022-08-24 DIAGNOSIS — R42 VERTIGO: Primary | ICD-10-CM

## 2022-08-24 PROCEDURE — 97112 NEUROMUSCULAR REEDUCATION: CPT

## 2022-08-24 NOTE — PROGRESS NOTES
Daily Note     Today's date: 2022  Patient name: Blanca Vo  : 1945  MRN: [id number]  Referring provider: Vik Grande MD  Dx:   Encounter Diagnosis     ICD-10-CM    1  Vertigo  R42        Start Time: 1155  Stop Time: 1233  Total time in clinic (min): 38 minutes    Subjective: This Monday had a near episode when making a meal  Grabbed the counter and it went away  Otherwise he has not had the feeling  Denies any lightheadedness or vertigo during episode  Feels like he is fearful of falling when episode happens  Objective: See treatment diary below  Seated LUE: 154/89  Standing LUE: 118/82    Assessment:  Pt denies sx with position change and BP change  Unable to reproduce pt reported feeling during session  Performs slow dual tasking, often stopping gait to think or toss ball  Does not feel like his "feeling" happens when he dual tasks at home  Shows fair balance on compliant surfaces with eyes open  With eyes closed shows mod-severe steady state postural sway  Fair carryover with cues to shift weight forward  Instructed to continue with HEP as prescribed  Patient would benefit from continued PT to reduce dizziness and improve balance  Plan: Continue per plan of care  Balance training  Perturbations, uneven/compliant surfaces  Discussed return to PCP for follow up if needed in future   Re-assess nv     Precautions: Hx of falls, Hx of osteopenia  Re-eval Date: 2022    Date 7/27 8/3 8/10 8/17 8/24   Visit Count 1 2 3 4 5   FOTO See IE       Pain In See IE       Pain Out                Testing        TUG 8  92 TUGC       5xSTS 14 69       DHI 20       FGA 18       MiniBest 17       Neuro Re-Ed  MiniBest, DHI      Dynamic balance  Gait HT 20ft x4 cues for continuing gait Gait HT/HN 20ft x4 cues for continuing gait CGA    EC gait 20ft x1  CGA Gait HT 20ft x4 cues for cont gait    RTB perturbations solo step fwd walking 12 laps Foam beam lat steps 6 laps, fwd 6 laps    Doreatha Belts toss cog dual task walking 20ft x6    Gait HT cues for HT and continue walking 20ft x4   Static balance  HT foam FA wall touch 10" x4 EC firm FT 20" x3    HT/HN firm EO FT 20" x1 EO foam solo step ball toss x30 Foam EC FA 20" x3   Obstacle course   Low hurdles 6 laps step through CGA High hurdles 4 laps step through CGA    VORx1   30" x1      VORx1 Vergence        Uneven surfaces    Lat steps compliant surface low hurdles 4 laps    Cone    3x8 2x8 upward reaching Foam cone  3x6   Ther Ex                                                                        Ther Activity        ADL                Gait Training        Divided Attention        Head turns        Modalities         prn

## 2022-08-31 ENCOUNTER — OFFICE VISIT (OUTPATIENT)
Dept: PHYSICAL THERAPY | Facility: CLINIC | Age: 77
End: 2022-08-31
Payer: COMMERCIAL

## 2022-08-31 DIAGNOSIS — R42 VERTIGO: Primary | ICD-10-CM

## 2022-08-31 PROCEDURE — 97112 NEUROMUSCULAR REEDUCATION: CPT

## 2022-08-31 NOTE — PROGRESS NOTES
PT Re-Evaluation     Today's date: 2022  Patient name: Rashid Garcia  : 1945  MRN: [id number]  Referring provider: Monie Cortes MD  Dx:   Encounter Diagnosis     ICD-10-CM    1  Vertigo  R42        Start Time: 1325  Stop Time: 1415  Total time in clinic (min): 50 minutes    Assessment  Assessment details: Patient is a 77y o  year old male presenting to [facility] s/p diagnosis of vertigo  Atilio Obrien is progressing toward his therapy goals  His dizziness/instability has not changed in frequency, however, he denies falls since starting therapy  It seems his dizziness is more inline with imbalance with fear of falling vs dizziness  In therapy his feeling of "dizziness" has not been able to be recreated  Discussed with patient about re-visiting PCP if therapy is unable to assist this feeling that he has at home in certain situations where he is about to lose his balance  Patient in agreement to continue to work on balance and attempt to recreate feeling  Overall it seems his balance has slightly improved with his FGA improving by 3 points and MiniBest by 2 points  Instructed patient on continuing HEP and given walking program and directed on how to perform safely  Impairments: abnormal gait, activity intolerance, impaired balance, impaired physical strength, lacks appropriate home exercise program and safety issue     Prognosis: good    Goals  In 4 weeks, patient will:  1  Report improvement in dizziness by at least 25% in severity - no met  2  Improve 5xSTS by at least 2 3 seconds to show improved LE strength to assist with transfers  - nearly met  3  Complete MiniBest to further assess balance - met    In 4-10 weeks, patient will:  1  Meet FOTO predicted score to demonstrate improvement in functional mobility  2   Demonstrate consistent carryover with HEP and walking program   3   Improve FGA by at least 3 points to show improving dynamic balance and reduced dizziness - met  4    Complete DHI and improve by at least 10 points to show improving dizziness symptoms  - progressing  5  Improve MiniBest by at least 4 points to show improving balance control  - progressing      Plan  Patient would benefit from: skilled physical therapy  Planned therapy interventions: neuromuscular re-education, manual therapy, patient education, home exercise program, therapeutic exercise, therapeutic activities and gait training  Frequency: 2x week  Duration in weeks: 8  Plan of Care beginning date: 7/27/2022  Plan of Care expiration date: 9/25/2022  Treatment plan discussed with: patient        Subjective Evaluation    History of Present Illness  Mechanism of injury: 8/31: Feels like the frequency at which he has been experiencing episodes has not changed  He has not fallen since being in PT  Describes more of an off-balance sensation vs dizziness  Had episode getting up from bed this morning but grabbed onto his dresser and the sensation passed  Does not note any room spinning dizziness or lightheadedness  Is afraid of falling when he feels his balance waiver  Present at PT: Started in 2019, fell down in the tub while taking a shower and getting dizzy  Feels like he is going to fall and lose his balance  Has had a few falls recently  Most recent fall was getting out of bed and walking to his dresser, tried to grab on [employer]  Does not happen every time he gets in/out of bed  No dizziness when walking out in community or while driving  Feels like he has dizziness in his head and loses his balance  Most recent dizziness was associated with his recent fall  Last time before that was in April  He believes that he has parkinsons, when asked if he was following any doctors for it, he notes he was not  Feels like his blood pressures are good as far as he knows     Pain  No pain reported    Social Support  Steps to enter house: no  Stairs in house: no   Lives in: apartment  Lives with: alone      Diagnostic Tests  No diagnostic tests performed  Treatments  No previous or current treatments  Patient Goals  Patient goals for therapy: improved balance  Patient goal: "prevent falls and get rid of dizziness"        Objective     Concurrent Complaints  Negative for headaches, nausea/motion sickness, tinnitus, visual change and hearing loss    Neurological Testing     Additional Neurological Details  Displays LUE tremor  Appears to be rigid around ankles    Strength/Myotome Testing     Left Hip   Planes of Motion   Flexion: 4  Extension: 4  Abduction: 4    Right Hip   Planes of Motion   Flexion: 4  Extension: 4  Abduction: 4    Left Knee   Extension: 4    Right Knee   Extension: 4    Left Ankle/Foot   Dorsiflexion: 4    Right Ankle/Foot   Dorsiflexion: 4  Neuro Exam:     Dizziness  Positive for disequilibrium and vertigo  Negative for oscillopsia, light-headedness and diplopia       Headaches   Patient reports headaches: No      Cervical exam   Modified VBI   Left: asymptomatic  Right: asymptomatic  Seated posture: forward head posture    Oculomotor exam   Oculomotor ROM: WNL  Resting nystagmus: not present   Gaze holding nystagmus: not present left  and not present right  Smooth pursuits: saccadic smooth pursuit  Vertical saccades: hypometric and Slowed  Horizontal saccades: hypometric  and slowed  Cover test: normal    Positional testing   Ira-Hallpike   Left posterior canal: WNL  Right posterior canal: WNL  Ira-Hallpike comment: (-) sidelying test    Sensation   Light touch LE: left WNL and right WNL    Coordination   Finger to nose: left WNL  Rapid alternating movements: UE WNL and LE impaired    Functional outcomes   5x sit to stand: 12 90 seconds (seconds)  Functional outcome comment: Mini Best  Anticipatory     Sit to Stand    2= Normal:  Comes to stand without use of hands and stabilizes independently     Rise to Toes    1=Moderate:  Heels up, but not full range (smaller than when holding hands) OR noticeable instability for 3 seconds     Stand on one leg      1=Moderate: < 20 seconds      Right:  3 seconds  Left:  4 seconds       Reactive Postural Control     Compensatory Stepping Correction - Forward    2= Normal:  Recovers independently with a single, large step (2nd realignment step is allowed)  Compensatory Stepping Correction - Backward      1=Moderate: More than one step used to recover equilibrium     Compensatory Stepping Correction - Lateral    2= Normal:  Recovers independently with a single, large step (cross over or lateral OK)  Right: 1  Left: 1    Sensory Orientation    Stance (Feet Together); Eyes open, Firm surface    2= Normal:  30 seconds     Stance (Feet Together);  Eyes closed, Foam surface      1=Moderate: < 30 seconds    Incline - Eyes Closed    1=Moderate:  Stands independently <30 sec OR aligns with surface    Dynamic Gait    Change in gait speed    2= Normal:  Significantly changes walking speed without imbalance     Walk with Head Turns - Horizontal      1= Moderate:  Performs head turns with reduction in gait speed     Walk with Pivot Turns    1= Moderate:  Turns with feet close SLOW (> or = to 4 steps) with good balance     Step over obstacles    1= Moderate: step over box but touches box OR displays cautious behavior by slowing gait     Timed up and go with dual task (3 meter walk)    TU 48 Seconds  Dual Task TU 75 Seconds    1= Moderate: Dual Task affects counting OR walking (>10%) when compared to the TUG without Dual Task    Total Score: 19/28    Functional outcome gait comment: Stooped posturing, slightly crouched gait, heel strike @ initial contact, wider base of support             Precautions: Hx of falls, Hx of osteopenia  Re-eval Date: 2022    Date        Visit Count 6       FOTO See IE       Pain In See IE       Pain Out                Testing        TUG 8  75 TUGC       5xSTS 12 90       DHI 20       FGA 21       MiniBest 19       Neuro Re-Ed FGA, MiniBest, 5xSTS Dynamic balance        Static balance        Obstacle course        VORx1        VORx1 Vergence        Uneven surfaces        Cone         Ther Ex                                                                        Ther Activity        ADL                Gait Training        Divided Attention        Head turns        Modalities         prn

## 2022-09-06 ENCOUNTER — TELEPHONE (OUTPATIENT)
Dept: PHYSICAL THERAPY | Facility: CLINIC | Age: 77
End: 2022-09-06

## 2022-09-06 NOTE — TELEPHONE ENCOUNTER
Left message for PCP regarding patient and how PT is going  With inconclusive vestibular findings and increased reports of imbalance/fear of falling and rigid posturing  Gave best call back number to discuss findings, [phone]

## 2022-09-07 ENCOUNTER — OFFICE VISIT (OUTPATIENT)
Dept: PHYSICAL THERAPY | Facility: CLINIC | Age: 77
End: 2022-09-07
Payer: COMMERCIAL

## 2022-09-07 DIAGNOSIS — R42 VERTIGO: Primary | ICD-10-CM

## 2022-09-07 PROCEDURE — 97112 NEUROMUSCULAR REEDUCATION: CPT

## 2022-09-07 NOTE — PROGRESS NOTES
Daily Note     Today's date: 2022  Patient name: Rashid Garcia  : 1945  MRN: [id number]  Referring provider: Monie Cortes MD  Dx:   Encounter Diagnosis     ICD-10-CM    1  Vertigo  R42        Start Time: 6000  Stop Time: 1400  Total time in clinic (min): 48 minutes    Subjective: 2 episodes over the weekend  Occurred after he was sitting for a while and went to get up to go to his kitchen and to his dresser  Describes them as mild feelings, no fear of falling or episodes where if he takes a step he will fall  No feelings of going to faint  Objective: See treatment diary below  RUE supine: 159/58 > RUE standin/64  RUE supine: 145/65 > RUE standin/56    Assessment: Pt reports potentially seem to be inline with orthostatic blood pressure however, no reproduction of dizziness when taking blood pressures  Pt reports mild reproduction of dizziness with floor<>stand transfer, resolved after a few seconds  Did not feel like he is afraid to fall but the dizziness is similar  Still has difficulty with reduced visual cues  Less cues for pacing needed for continuing gait with head turn gait  Feels winded with stairs however, no reproduction of dizziness  Patient would benefit from continued PT      Plan: Continue per plan of care  Transitional movements that may trigger dizziness       Precautions: Hx of falls, Hx of osteopenia  Re-eval Date: 2022    Date       Visit Count 6 7      FOTO See IE       Pain In See IE       Pain Out                Testing        TUG 8 62 // 13 75 TUGC       5xSTS 12 90       DHI 20       FGA 21       MiniBest 19       Neuro Re-Ed FGA, MiniBest, 5xSTS       Dynamic balance  bwd walking 20ft x1    EC gait 10ft x2    HT gait 20ft x2      Static balance  Firm EC mod tandem 30" x2      Obstacle course        VORx1        VORx1 Vergence        Uneven surfaces        Cone         Floor<>stand transfer  X3, 1 episode of mild dizziness      Ther Ex Ther Activity        ADL                Gait Training  Stairs x2 flights      Divided Attention        Head turns        Modalities         prn

## 2022-09-14 ENCOUNTER — OFFICE VISIT (OUTPATIENT)
Dept: PHYSICAL THERAPY | Facility: CLINIC | Age: 77
End: 2022-09-14
Payer: COMMERCIAL

## 2022-09-14 DIAGNOSIS — R42 VERTIGO: Primary | ICD-10-CM

## 2022-09-14 PROCEDURE — 97112 NEUROMUSCULAR REEDUCATION: CPT

## 2022-09-14 PROCEDURE — 97530 THERAPEUTIC ACTIVITIES: CPT

## 2022-09-14 NOTE — PROGRESS NOTES
Daily Note     Today's date: 2022  Patient name: Chevy Palmer  : 1945  MRN: [id number]  Referring provider: Ese Raymond MD  Dx:   Encounter Diagnosis     ICD-10-CM    1  Vertigo  R42        Start Time: 1330  Stop Time: 1418  Total time in clinic (min): 48 minutes    Subjective: 1 episode on  where he was getting up from the couch and going to the kitchen  He felt the feeling, no fear of falling though  He notes everything happened so quick  He grazed his head on the wall and fell  Notes no injuries  Did not feel like he lost consciousness  Denies change in hearing or vision during episode  Objective: See treatment diary below  (-) DHP with foam wedge    Assessment: Trials of various movement patterns that trigger dizziness/feeling at patient's home  No reproduction with sit>stand or supine>stand  1 trial of floor>stand transfer seemed to trigger sensation with improved feeling after short duration  Feels like it is a similar feeling  Did not feel like a full episode he normally has  Instructed on watching for triggers of these episodes and to stay standing 20-30 seconds after transitional movements after sitting/laying for a while  Pt verbalized understanding  Patient would benefit from continued PT      Plan: Continue per plan of care  Transitional movements that may trigger dizziness  Follow up with patient in 2 weeks       Precautions: Hx of falls, Hx of osteopenia  Re-eval Date: 2022    Date      Visit Count 6 7 8     FOTO See IE       Pain In See IE       Pain Out                Testing        TUG 8 / 13 75 TUGC       5xSTS 12 90       DHI 20       FGA 21       MiniBest 19       Neuro Re-Ed FGA, MiniBest, 5xSTS       Dynamic balance  bwd walking 20ft x1    EC gait 10ft x2    HT gait 20ft x2      Static balance  Firm EC mod tandem 30" x2      Obstacle course        VORx1        VORx1 Vergence        Uneven surfaces        Cone         Floor<>stand transfer  X3, 1 episode of mild dizziness X5, 1 episode of mild dizziness     Education   AF     Ther Ex                                                                        Ther Activity        ADL   Supine > stand x5    Sit> stand x5             Gait Training  Stairs x2 flights      Divided Attention        Head turns        Modalities         prn

## 2022-09-16 ENCOUNTER — TELEPHONE (OUTPATIENT)
Dept: FAMILY MEDICINE CLINIC | Facility: HOSPITAL | Age: 77
End: 2022-09-16

## 2022-09-16 DIAGNOSIS — H60.543 ACUTE ECZEMATOID OTITIS EXTERNA OF BOTH EARS: Primary | ICD-10-CM

## 2022-09-16 NOTE — TELEPHONE ENCOUNTER
Patient would like to know if you could send in ear drops to his pharmacy as he has an ear infection

## 2022-09-19 ENCOUNTER — TELEPHONE (OUTPATIENT)
Dept: FAMILY MEDICINE CLINIC | Facility: HOSPITAL | Age: 77
End: 2022-09-19

## 2022-09-19 NOTE — TELEPHONE ENCOUNTER
LM ON OUR VM @ 1056AM    HAS PHYSICAL THERAPY WITH [facility] FOR HIS VERTIGO - THERAPIST DOES NOT THINK ITS VERTIGO     PCB

## 2022-09-20 ENCOUNTER — TELEPHONE (OUTPATIENT)
Dept: FAMILY MEDICINE CLINIC | Facility: HOSPITAL | Age: 77
End: 2022-09-20

## 2022-09-20 NOTE — TELEPHONE ENCOUNTER
PT note seems like it is more lightheadedness perhaps BP related rather than vertigo  Could Ish get his labs done this week and come in for visit with me on Monday at 1-1:40?

## 2022-09-20 NOTE — TELEPHONE ENCOUNTER
Patient was sent to [facility]  He has been going for 8 weeks and the therapist does not think it is vertigo and would like to talk to the doctor    [phone]

## 2022-09-21 ENCOUNTER — RA CDI HCC (OUTPATIENT)
Dept: OTHER | Facility: HOSPITAL | Age: 77
End: 2022-09-21

## 2022-09-21 NOTE — PROGRESS NOTES
[facility] 75  coding opportunities       Chart reviewed, no opportunity found:   [address]        Patients Insurance     Medicare Insurance: [facility] [employer]

## 2022-09-26 ENCOUNTER — OFFICE VISIT (OUTPATIENT)
Dept: FAMILY MEDICINE CLINIC | Facility: HOSPITAL | Age: 77
End: 2022-09-26
Payer: COMMERCIAL

## 2022-09-26 VITALS
DIASTOLIC BLOOD PRESSURE: 81 MMHG | WEIGHT: 167 LBS | OXYGEN SATURATION: 99 % | HEART RATE: 61 BPM | HEIGHT: 70 IN | BODY MASS INDEX: 23.91 KG/M2 | TEMPERATURE: 97.2 F | SYSTOLIC BLOOD PRESSURE: 118 MMHG

## 2022-09-26 DIAGNOSIS — I10 ESSENTIAL HYPERTENSION: ICD-10-CM

## 2022-09-26 DIAGNOSIS — R42 LIGHT-HEADED FEELING: ICD-10-CM

## 2022-09-26 DIAGNOSIS — I48.91 NEW ONSET ATRIAL FIBRILLATION (HCC): Primary | ICD-10-CM

## 2022-09-26 PROCEDURE — 93000 ELECTROCARDIOGRAM COMPLETE: CPT | Performed by: FAMILY MEDICINE

## 2022-09-26 PROCEDURE — 3079F DIAST BP 80-89 MM HG: CPT | Performed by: FAMILY MEDICINE

## 2022-09-26 PROCEDURE — 3074F SYST BP LT 130 MM HG: CPT | Performed by: FAMILY MEDICINE

## 2022-09-26 PROCEDURE — 1160F RVW MEDS BY RX/DR IN RCRD: CPT | Performed by: FAMILY MEDICINE

## 2022-09-26 PROCEDURE — 99214 OFFICE O/P EST MOD 30 MIN: CPT | Performed by: FAMILY MEDICINE

## 2022-09-26 RX ORDER — METOPROLOL SUCCINATE 25 MG/1
25 TABLET, EXTENDED RELEASE ORAL DAILY
Qty: 30 TABLET | Refills: 5 | Status: SHIPPED | OUTPATIENT
Start: 2022-09-26 | End: 2022-10-10 | Stop reason: SDUPTHER

## 2022-09-26 NOTE — PROGRESS NOTES
Name: Dheeraj Song      : 1945      MRN: [id number]  Encounter Provider: Gardenia Tadeo MD  Encounter Date: 2022   Encounter department: [facility] [address]  New onset atrial fibrillation ([facility] [address] )  -     POCT ECG  -     Ambulatory Referral to Cardiology; Future  -     apixaban (Eliquis) 5 mg; Take 1 tablet (5 mg total) by mouth 2 (two) times a day  -     metoprolol succinate (Toprol XL) 25 mg 24 hr tablet; Take 1 tablet (25 mg total) by mouth daily    2  Light-headed feeling  Comments:  OK to stop Vestibular PT    3  Essential hypertension  Comments:  Some orthostatic tendency           Subjective      Had 8 weeks of PT for vertigo, less likely that than orthostasis    Last fall was about 3 weeks ago    Has feeling that he would fall when he gets up  Never gets it when sitting    One episode happened when preparing dinner in the kitchen  No BP checks at home    No sense of palpitations    Review of Systems   Constitutional: Negative for unexpected weight change  HENT: Negative  Eyes: Negative for visual disturbance  Respiratory: Negative  Cardiovascular: Negative  Gastrointestinal: Negative  Genitourinary: Negative  Musculoskeletal: Negative  Neurological: Positive for dizziness and light-headedness  Psychiatric/Behavioral: Negative  All other systems reviewed and are negative        Current Outpatient Medications on File Prior to Visit   Medication Sig    amLODIPine (NORVASC) 5 mg tablet Take 1 tablet (5 mg total) by mouth 2 (two) times a day    FLUoxetine (PROzac) 20 mg capsule Take 20 mg by mouth daily    imipramine (TOFRANIL) 50 mg tablet Take 50 mg by mouth daily at bedtime as needed    linaCLOtide (Linzess) 290 MCG CAPS Take 1 capsule by mouth daily    neomycin-polymyxin-hydrocortisone (CORTISPORIN) otic solution Administer 4 drops to the right ear every 6 (six) hours    perphenazine 4 mg tablet Take 4 mg by mouth daily at bedtime    traZODone (DESYREL) 150 mg tablet Take 150 mg by mouth daily at bedtime as needed    zolpidem (AMBIEN) 10 mg tablet Take by mouth    valACYclovir (VALTREX) 500 mg tablet Take 1 tablet (500 mg total) by mouth 2 (two) times a day for 5 days       Objective     /81 (BP Location: Left arm, Patient Position: Sitting, Cuff Size: Standard)   Pulse 61   Temp (!) 97 2 °F (36 2 °C) (Tympanic)   Ht 5' 9 5" (1 765 m)   Wt 75 8 kg (167 lb)   SpO2 99%   BMI 24 31 kg/m²     Physical Exam  Vitals and nursing note reviewed  Constitutional:       Appearance: Normal appearance  Cardiovascular:      Rate and Rhythm: Tachycardia present  Rhythm irregular  Pulses: Normal pulses  Heart sounds: Normal heart sounds  Pulmonary:      Effort: Pulmonary effort is normal       Breath sounds: Normal breath sounds  Neurological:      Mental Status: He is alert and oriented to person, place, and time     Psychiatric:         Mood and Affect: Mood normal        Lizeth Kendrick MD

## 2022-09-27 ENCOUNTER — APPOINTMENT (OUTPATIENT)
Dept: PHYSICAL THERAPY | Facility: CLINIC | Age: 77
End: 2022-09-27
Payer: COMMERCIAL

## 2022-09-30 DIAGNOSIS — I10 ESSENTIAL HYPERTENSION: ICD-10-CM

## 2022-09-30 RX ORDER — AMLODIPINE BESYLATE 5 MG/1
TABLET ORAL
Qty: 180 TABLET | Refills: 1 | Status: SHIPPED | OUTPATIENT
Start: 2022-09-30

## 2022-10-10 ENCOUNTER — CONSULT (OUTPATIENT)
Dept: CARDIOLOGY CLINIC | Facility: CLINIC | Age: 77
End: 2022-10-10
Payer: COMMERCIAL

## 2022-10-10 VITALS
HEIGHT: 70 IN | WEIGHT: 166.2 LBS | SYSTOLIC BLOOD PRESSURE: 118 MMHG | BODY MASS INDEX: 23.79 KG/M2 | HEART RATE: 71 BPM | DIASTOLIC BLOOD PRESSURE: 74 MMHG

## 2022-10-10 DIAGNOSIS — I10 ESSENTIAL HYPERTENSION: ICD-10-CM

## 2022-10-10 DIAGNOSIS — I48.91 NEW ONSET ATRIAL FIBRILLATION (HCC): ICD-10-CM

## 2022-10-10 DIAGNOSIS — I49.1 PAC (PREMATURE ATRIAL CONTRACTION): Primary | ICD-10-CM

## 2022-10-10 PROBLEM — L97.329 STASIS ULCER OF ANKLE, LEFT (HCC): Status: ACTIVE | Noted: 2022-10-10

## 2022-10-10 PROBLEM — I83.023 STASIS ULCER OF ANKLE, LEFT (HCC): Status: ACTIVE | Noted: 2022-10-10

## 2022-10-10 PROCEDURE — 93000 ELECTROCARDIOGRAM COMPLETE: CPT | Performed by: INTERNAL MEDICINE

## 2022-10-10 PROCEDURE — 99204 OFFICE O/P NEW MOD 45 MIN: CPT | Performed by: INTERNAL MEDICINE

## 2022-10-10 RX ORDER — METOPROLOL SUCCINATE 50 MG/1
50 TABLET, EXTENDED RELEASE ORAL DAILY
Qty: 30 TABLET | Refills: 11 | Status: SHIPPED | OUTPATIENT
Start: 2022-10-10

## 2022-10-10 NOTE — PROGRESS NOTES
[address] Cardiology  Office Consultation  Ulisses Mendez 77 y o  male MRN: [id number]        Chief Complaint    Chief Complaint   Patient presents with   • Consult   • Atrial Fibrillation     New onset       Referring Provider: Lori Noble MD    Impression & Plan:    1  New onset atrial fibrillation ([facility] [address] )  The patient does not actually appear to have atrial fibrillation  Prior EKG during which the diagnosis was made is likely frequent PACs and noisy baseline  This is consistent with EKG today that also shows sinus rhythm with frequent PACs  We will stop Eliquis  Increase metoprolol from 25 mg daily to 50 mg daily for additional P A-C suppression  Check Holter monitor     - Ambulatory Referral to Cardiology  - POCT ECG  - Holter monitor; Future  - metoprolol succinate (Toprol XL) 50 mg 24 hr tablet; Take 1 tablet (50 mg total) by mouth daily  Dispense: 30 tablet; Refill: 11    2  PAC (premature atrial contraction)  We will check a Holter to both evaluate PAC burden and assess for any tachyarrhythmias   - Holter monitor; Future    3  Essential hypertension  Well controlled on current therapy  Will increase metoprolol for PAC purposes from 25 mg daily to 50 mg daily  Continue amlodipine 5 mg daily  We will see Chelle Ojeda back in 3 months for routine follow-up  HPI: Ulisses Mendez is a 77y o  year old male with hypertension, vertigo, hyperlipidemia, and recently diagnosed atrial fibrillation who presents today to establish care  Patient denies palpitations, heart racing, dyspnea, chest pain  Personally reinterpreted EKG dated 09/26/2022 from [facility] care, scanned in our media folder on 09/27/2022 under family medicine  Review of this study reveals sinus rhythm with frequent PACs, normal axis, otherwise normal intervals    Previous interpretation of atrial fibrillation is incorrect likely confounded by poor baseline which obscures P waves and frequent PACs     Review of Systems   Constitutional: Negative for activity change and unexpected weight change  HENT: Negative for facial swelling  Eyes: Negative for visual disturbance  Respiratory: Negative for cough and chest tightness  Cardiovascular: Negative for chest pain  Gastrointestinal: Negative for blood in stool  Musculoskeletal: Negative for myalgias  Skin: Negative for pallor  Allergic/Immunologic: Negative for immunocompromised state  Neurological: Negative for syncope and light-headedness  Psychiatric/Behavioral: Negative for agitation and hallucinations  Past Medical History:   Diagnosis Date   • Anxiety    • Cancer [facility])    • Depression    • ED (erectile dysfunction)    • GERD (gastroesophageal reflux disease)    • OCD (obsessive compulsive disorder)      Past Surgical History:   Procedure Laterality Date   • COLONOSCOPY  10/12/2017     unremarkable  A 10 year recall advised  • HERNIA REPAIR     • TONSILECTOMY AND ADNOIDECTOMY       Social History     Substance and Sexual Activity   Alcohol Use Yes    Comment: Rarely     Social History     Substance and Sexual Activity   Drug Use No     Social History     Tobacco Use   Smoking Status Former Smoker   Smokeless Tobacco Never Used     Family History   Problem Relation Age of Onset   • Depression Father    • Hypertension Mother    • Colon cancer Neg Hx    • Colon polyps Neg Hx        Allergies:  No Known Allergies    Medications (as of START of this encounter):    Outpatient Medications Prior to Visit   Medication Sig Dispense Refill   • amLODIPine (NORVASC) 5 mg tablet TAKE 1 TABLET BY MOUTH TWICE A  tablet 1   • FLUoxetine (PROzac) 20 mg capsule Take 20 mg by mouth daily  4   • imipramine (TOFRANIL) 50 mg tablet Take 50 mg by mouth daily at bedtime as needed  4   • linaCLOtide (Linzess) 290 MCG CAPS Take 1 capsule by mouth daily 30 capsule 11   • neomycin-polymyxin-hydrocortisone (CORTISPORIN) otic solution Administer 4 drops to the right ear every 6 (six) hours 10 mL 0   • perphenazine 4 mg tablet Take 4 mg by mouth daily at bedtime  4   • traZODone (DESYREL) 150 mg tablet Take 150 mg by mouth daily at bedtime as needed  4   • zolpidem (AMBIEN) 10 mg tablet Take by mouth     • apixaban (Eliquis) 5 mg Take 1 tablet (5 mg total) by mouth 2 (two) times a day 60 tablet 1   • metoprolol succinate (Toprol XL) 25 mg 24 hr tablet Take 1 tablet (25 mg total) by mouth daily 30 tablet 5   • valACYclovir (VALTREX) 500 mg tablet Take 1 tablet (500 mg total) by mouth 2 (two) times a day for 5 days (Patient not taking: Reported on 10/10/2022) 10 tablet 0     No facility-administered medications prior to visit  Vitals:    10/10/22 1033   BP: 118/74   Pulse: 71     Weight (last 2 days)     Date/Time Weight    10/10/22 1033 75 4 (166 2)          General: Shay Gleason is a well appearing male, in no acute distress, sitting comfortably  HEENT: moist mucous membranes, EOMI  Neck:  No JVD, supple, trachea midline   Cardiovascular: unremarkable S1/S2, r regular rate, irregularly irregular rhythm no murmurs, rubs or gallops   Pulmonary: normal respiratory effort, CTAB   Abdomen: soft and nondistended  Extremities: No lower extremity edema  Warm and well perfused extremities  Neuro: no focal motor deficits, AAOx3 (person, place, time)  Psych: Normal mood and affect, cooperative      Laboratory Studies:    Laboratory studies personally reviewed  Lipid profile 12/3/21 , TG 92, HDL 49,  mg/dL    Cardiac testing:     EKG reviewed personally:   EKG in the office today shows sinus rhythm, frequent PACs, normal axis, normal intervals  Borderline study  Time Spent:  Total time (face-to-face and non-face-to-face) spent on today's visit was 40 minutes   This includes preparation for the visits (i e  reviewing test results), performance of a medically appropriate history and examination, and orders for medications, tests or other procedures  This time is exclusive of procedures performed and time spent teaching  Johnny Hutson MD    This note was completed in part utilizing [employer] flu[employer] direct voice recognition software  Grammatical errors, random word insertion, spelling mistakes, occasional wrong word or "sound-alike" substitutions and incomplete sentences may be an occasional consequence of the system secondary to software limitations, ambient noise and hardware issues  At the time of dictation, efforts were made to edit, clarify and /or correct errors  Please read the chart carefully and recognize, using context, where substitutions have occurred  If you have any questions or concerns about the context, text or information contained within the body of this dictation, please contact myself, the provider, for further clarification

## 2022-10-12 PROBLEM — Z00.00 MEDICARE ANNUAL WELLNESS VISIT, SUBSEQUENT: Status: RESOLVED | Noted: 2018-06-04 | Resolved: 2022-10-12

## 2022-10-18 ENCOUNTER — HOSPITAL ENCOUNTER (OUTPATIENT)
Dept: NON INVASIVE DIAGNOSTICS | Age: 77
Discharge: HOME | End: 2022-10-18
Payer: COMMERCIAL

## 2022-10-18 VITALS
WEIGHT: 166 LBS | HEIGHT: 69 IN | BODY MASS INDEX: 24.59 KG/M2 | HEART RATE: 65 BPM | SYSTOLIC BLOOD PRESSURE: 118 MMHG | DIASTOLIC BLOOD PRESSURE: 72 MMHG

## 2022-10-18 DIAGNOSIS — I49.1 PAC (PREMATURE ATRIAL CONTRACTION): ICD-10-CM

## 2022-10-18 DIAGNOSIS — I48.91 NEW ONSET ATRIAL FIBRILLATION (HCC): ICD-10-CM

## 2022-10-18 DIAGNOSIS — I10 ESSENTIAL HYPERTENSION: ICD-10-CM

## 2022-10-18 LAB
AORTIC ROOT: 3 CM
APICAL FOUR CHAMBER EJECTION FRACTION: 46 %
ASCENDING AORTA: 3.5 CM
DOP CALC LVOT AREA: 3.8 CM2
DOP CALC LVOT DIAMETER: 2.2 CM
E WAVE DECELERATION TIME: 235 MS
FRACTIONAL SHORTENING: 32 % (ref 28–44)
INTERVENTRICULAR SEPTUM IN DIASTOLE (PARASTERNAL SHORT AXIS VIEW): 1.1 CM
INTERVENTRICULAR SEPTUM: 1.1 CM (ref 0.6–1.1)
LEFT ATRIUM AREA SYSTOLE SINGLE PLANE A4C: 13.9 CM2
LEFT ATRIUM SIZE: 4.2 CM
LEFT INTERNAL DIMENSION IN SYSTOLE: 3.6 CM (ref 2.1–4)
LEFT VENTRICULAR INTERNAL DIMENSION IN DIASTOLE: 5.3 CM (ref 3.5–6)
LEFT VENTRICULAR POSTERIOR WALL IN END DIASTOLE: 1 CM
LEFT VENTRICULAR STROKE VOLUME: 76 ML
LVSV (TEICH): 76 ML
MV E'TISSUE VEL-SEP: 6 CM/S
MV PEAK A VEL: 0.76 M/S
MV PEAK E VEL: 65 CM/S
MV STENOSIS PRESSURE HALF TIME: 68 MS
MV VALVE AREA P 1/2 METHOD: 3.24 CM2
RIGHT ATRIUM AREA SYSTOLE A4C: 16.4 CM2
RIGHT VENTRICLE ID DIMENSION: 4 CM
SL CV LV EF: 50
SL CV PED ECHO LEFT VENTRICLE DIASTOLIC VOLUME (MOD BIPLANE) 2D: 133 ML
SL CV PED ECHO LEFT VENTRICLE SYSTOLIC VOLUME (MOD BIPLANE) 2D: 56 ML
TR MAX PG: 22 MMHG
TR PEAK VELOCITY: 2.3 M/S
TRICUSPID ANNULAR PLANE SYSTOLIC EXCURSION: 2.1 CM
TRICUSPID VALVE PEAK REGURGITATION VELOCITY: 2.33 M/S

## 2022-10-18 PROCEDURE — 93306 TTE W/DOPPLER COMPLETE: CPT

## 2022-10-18 PROCEDURE — 93226 XTRNL ECG REC<48 HR SCAN A/R: CPT

## 2022-10-18 PROCEDURE — 93306 TTE W/DOPPLER COMPLETE: CPT | Performed by: INTERNAL MEDICINE

## 2022-10-18 PROCEDURE — 93225 XTRNL ECG REC<48 HRS REC: CPT

## 2022-10-26 PROCEDURE — 93227 XTRNL ECG REC<48 HR R&I: CPT | Performed by: INTERNAL MEDICINE

## 2022-10-31 DIAGNOSIS — F42.2 MIXED OBSESSIONAL THOUGHTS AND ACTS: ICD-10-CM

## 2022-10-31 DIAGNOSIS — F34.1 DYSTHYMIC DISORDER: Primary | ICD-10-CM

## 2022-10-31 RX ORDER — ZOLPIDEM TARTRATE 10 MG/1
TABLET ORAL
Qty: 30 TABLET | Refills: 3 | Status: SHIPPED | OUTPATIENT
Start: 2022-10-31

## 2022-11-03 DIAGNOSIS — I48.91 NEW ONSET ATRIAL FIBRILLATION (HCC): ICD-10-CM

## 2022-11-03 RX ORDER — METOPROLOL SUCCINATE 50 MG/1
TABLET, EXTENDED RELEASE ORAL
Qty: 90 TABLET | Refills: 4 | Status: SHIPPED | OUTPATIENT
Start: 2022-11-03

## 2022-11-21 ENCOUNTER — APPOINTMENT (OUTPATIENT)
Dept: LAB | Facility: HOSPITAL | Age: 77
End: 2022-11-21

## 2022-11-21 DIAGNOSIS — I10 ESSENTIAL HYPERTENSION: ICD-10-CM

## 2022-11-21 DIAGNOSIS — D50.0 IRON DEFICIENCY ANEMIA DUE TO CHRONIC BLOOD LOSS: ICD-10-CM

## 2022-11-21 DIAGNOSIS — E78.2 MIXED HYPERLIPIDEMIA: ICD-10-CM

## 2022-11-21 DIAGNOSIS — C61 PROSTATE CANCER (HCC): ICD-10-CM

## 2022-11-21 LAB
ALBUMIN SERPL BCP-MCNC: 3.3 G/DL (ref 3.5–5)
ALP SERPL-CCNC: 113 U/L (ref 46–116)
ALT SERPL W P-5'-P-CCNC: 22 U/L (ref 12–78)
ANION GAP SERPL CALCULATED.3IONS-SCNC: 6 MMOL/L (ref 4–13)
AST SERPL W P-5'-P-CCNC: 15 U/L (ref 5–45)
BASOPHILS # BLD AUTO: 0.07 THOUSANDS/ÂΜL (ref 0–0.1)
BASOPHILS NFR BLD AUTO: 1 % (ref 0–1)
BILIRUB SERPL-MCNC: 0.48 MG/DL (ref 0.2–1)
BUN SERPL-MCNC: 22 MG/DL (ref 5–25)
CALCIUM ALBUM COR SERPL-MCNC: 9.4 MG/DL (ref 8.3–10.1)
CALCIUM SERPL-MCNC: 8.8 MG/DL (ref 8.3–10.1)
CHLORIDE SERPL-SCNC: 109 MMOL/L (ref 96–108)
CHOLEST SERPL-MCNC: 238 MG/DL
CO2 SERPL-SCNC: 24 MMOL/L (ref 21–32)
CREAT SERPL-MCNC: 1.33 MG/DL (ref 0.6–1.3)
EOSINOPHIL # BLD AUTO: 0.4 THOUSAND/ÂΜL (ref 0–0.61)
EOSINOPHIL NFR BLD AUTO: 5 % (ref 0–6)
ERYTHROCYTE [DISTWIDTH] IN BLOOD BY AUTOMATED COUNT: 14.3 % (ref 11.6–15.1)
GFR SERPL CREATININE-BSD FRML MDRD: 51 ML/MIN/1.73SQ M
GLUCOSE P FAST SERPL-MCNC: 98 MG/DL (ref 65–99)
HCT VFR BLD AUTO: 36.1 % (ref 36.5–49.3)
HDLC SERPL-MCNC: 40 MG/DL
HGB BLD-MCNC: 10.9 G/DL (ref 12–17)
IMM GRANULOCYTES # BLD AUTO: 0.04 THOUSAND/UL (ref 0–0.2)
IMM GRANULOCYTES NFR BLD AUTO: 1 % (ref 0–2)
LDLC SERPL CALC-MCNC: 167 MG/DL (ref 0–100)
LYMPHOCYTES # BLD AUTO: 1.62 THOUSANDS/ÂΜL (ref 0.6–4.47)
LYMPHOCYTES NFR BLD AUTO: 20 % (ref 14–44)
MCH RBC QN AUTO: 24.5 PG (ref 26.8–34.3)
MCHC RBC AUTO-ENTMCNC: 30.2 G/DL (ref 31.4–37.4)
MCV RBC AUTO: 81 FL (ref 82–98)
MONOCYTES # BLD AUTO: 0.55 THOUSAND/ÂΜL (ref 0.17–1.22)
MONOCYTES NFR BLD AUTO: 7 % (ref 4–12)
NEUTROPHILS # BLD AUTO: 5.53 THOUSANDS/ÂΜL (ref 1.85–7.62)
NEUTS SEG NFR BLD AUTO: 66 % (ref 43–75)
NRBC BLD AUTO-RTO: 0 /100 WBCS
PLATELET # BLD AUTO: 253 THOUSANDS/UL (ref 149–390)
PMV BLD AUTO: 11.3 FL (ref 8.9–12.7)
POTASSIUM SERPL-SCNC: 4 MMOL/L (ref 3.5–5.3)
PROT SERPL-MCNC: 7.7 G/DL (ref 6.4–8.4)
PSA SERPL-MCNC: 0.2 NG/ML (ref 0–4)
RBC # BLD AUTO: 4.44 MILLION/UL (ref 3.88–5.62)
SODIUM SERPL-SCNC: 139 MMOL/L (ref 135–147)
TRIGL SERPL-MCNC: 157 MG/DL
TSH SERPL DL<=0.05 MIU/L-ACNC: 1.85 UIU/ML (ref 0.45–4.5)
WBC # BLD AUTO: 8.21 THOUSAND/UL (ref 4.31–10.16)

## 2022-12-06 ENCOUNTER — OFFICE VISIT (OUTPATIENT)
Dept: FAMILY MEDICINE CLINIC | Facility: HOSPITAL | Age: 77
End: 2022-12-06

## 2022-12-06 ENCOUNTER — APPOINTMENT (OUTPATIENT)
Dept: LAB | Facility: HOSPITAL | Age: 77
End: 2022-12-06

## 2022-12-06 VITALS
OXYGEN SATURATION: 98 % | HEART RATE: 71 BPM | WEIGHT: 170.6 LBS | BODY MASS INDEX: 24.42 KG/M2 | HEIGHT: 70 IN | TEMPERATURE: 96 F | SYSTOLIC BLOOD PRESSURE: 129 MMHG | DIASTOLIC BLOOD PRESSURE: 82 MMHG

## 2022-12-06 DIAGNOSIS — N18.31 STAGE 3A CHRONIC KIDNEY DISEASE (HCC): ICD-10-CM

## 2022-12-06 DIAGNOSIS — D50.0 IRON DEFICIENCY ANEMIA DUE TO CHRONIC BLOOD LOSS: ICD-10-CM

## 2022-12-06 DIAGNOSIS — C61 PROSTATE CANCER (HCC): ICD-10-CM

## 2022-12-06 DIAGNOSIS — Z23 ENCOUNTER FOR IMMUNIZATION: ICD-10-CM

## 2022-12-06 DIAGNOSIS — F60.5 OBSESSIVE COMPULSIVE PERSONALITY DISORDER (HCC): ICD-10-CM

## 2022-12-06 DIAGNOSIS — M85.80 SAPHO SYNDROME (HCC): ICD-10-CM

## 2022-12-06 DIAGNOSIS — M86.9 SAPHO SYNDROME (HCC): ICD-10-CM

## 2022-12-06 DIAGNOSIS — I10 ESSENTIAL HYPERTENSION: Primary | ICD-10-CM

## 2022-12-06 DIAGNOSIS — M65.9 SAPHO SYNDROME (HCC): ICD-10-CM

## 2022-12-06 DIAGNOSIS — L70.9 SAPHO SYNDROME (HCC): ICD-10-CM

## 2022-12-06 DIAGNOSIS — L40.3 SAPHO SYNDROME (HCC): ICD-10-CM

## 2022-12-06 DIAGNOSIS — G21.19 PARKINSONISM DUE TO DRUGS (HCC): ICD-10-CM

## 2022-12-06 LAB
CEA SERPL-MCNC: 1.6 NG/ML (ref 0–3)
FERRITIN SERPL-MCNC: 5 NG/ML (ref 8–388)
IRON SATN MFR SERPL: 6 % (ref 20–50)
IRON SERPL-MCNC: 29 UG/DL (ref 65–175)
TIBC SERPL-MCNC: 452 UG/DL (ref 250–450)

## 2022-12-06 RX ORDER — APIXABAN 5 MG/1
TABLET, FILM COATED ORAL
COMMUNITY
Start: 2022-10-24 | End: 2022-12-06 | Stop reason: ALTCHOICE

## 2022-12-06 NOTE — PROGRESS NOTES
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[place] [address] )    3  Prostate cancer ([place] [address] )  Comments:  ELIOT, PSA 0 2  Assessment & Plan:  ELIOT, stable PSA      4  Iron deficiency anemia due to chronic blood loss  -     Iron Panel (Includes Ferritin, Iron Sat%, Iron, and TIBC); Future  -     CEA; Future    5  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[place] [address] )    6  Stage 3a chronic kidney disease [facility])  Assessment & Plan:  Lab Results   Component Value Date    EGFR 51 2022    EGFR 61 2021    EGFR 57 2020    CREATININE 1 33 (H) 2022    CREATININE 1 17 2021    CREATININE 1 24 2020         7  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[address] )    8  Encounter for immunization  -     influenza vaccine, high-dose, PF 0 7 mL (FLUZONE HIGH-DOSE)         Subjective      6 month follow up    Had visit with Dr Dayton Madrid, identified frequent PAC's rather than a fib  No need for Eliquis, continues on Metoprolol    Had echo and holter, results reviewed with him by me  Note frequent PAC's, no a fib or other arrhythmias    Labs reviewed in detail with him and copy given  Very good metabolic control    Review of Systems   Constitutional: Positive for unexpected weight change  HENT: Negative  Respiratory: Negative  Cardiovascular: Negative  Gastrointestinal: Negative  Genitourinary: Negative  Musculoskeletal: Negative  Psychiatric/Behavioral: Negative  All other systems reviewed and are negative        Current Outpatient Medications on File Prior to Visit   Medication Sig   • amLODIPine (NORVASC) 5 mg tablet TAKE 1 TABLET BY MOUTH TWICE A DAY   • FLUoxetine (PROzac) 20 mg capsule Take 20 mg by mouth daily   • imipramine (TOFRANIL) 50 mg tablet Take 50 mg by mouth daily at bedtime as needed   • linaCLOtide (Linzess) 290 MCG CAPS Take 1 capsule by mouth daily   • metoprolol succinate (TOPROL-XL) 50 mg 24 hr tablet TAKE 1 TABLET BY MOUTH EVERY DAY   • neomycin-polymyxin-hydrocortisone (CORTISPORIN) otic solution Administer 4 drops to the right ear every 6 (six) hours   • perphenazine 4 mg tablet Take 4 mg by mouth daily at bedtime   • traZODone (DESYREL) 150 mg tablet Take 150 mg by mouth daily at bedtime as needed   • zolpidem (AMBIEN) 10 mg tablet TAKE 1 TABLET BY MOUTH AT BEDTIME AS NEEDED   • valACYclovir (VALTREX) 500 mg tablet Take 1 tablet (500 mg total) by mouth 2 (two) times a day for 5 days (Patient not taking: Reported on 10/10/2022)       Objective     /82 (BP Location: Left arm, Patient Position: Sitting, Cuff Size: Standard)   Pulse 71   Temp (!) 96 °F (35 6 °C) (Tympanic)   Ht 5' 9 5" (1 765 m)   Wt 77 4 kg (170 lb 9 6 oz)   SpO2 98%   BMI 24 83 kg/m²     Physical Exam  Vitals and nursing note reviewed  Constitutional:       Appearance: Normal appearance  Neck:      Vascular: No carotid bruit  Cardiovascular:      Rate and Rhythm: Normal rate and regular rhythm  Pulses: Normal pulses  Heart sounds: Normal heart sounds  Pulmonary:      Effort: Pulmonary effort is normal       Breath sounds: Normal breath sounds  Musculoskeletal:      Right lower leg: No edema  Left lower leg: No edema  Skin:     Findings: No rash  Neurological:      General: No focal deficit present  Mental Status: He is alert and oriented to person, place, and time     Psychiatric:         Mood and Affect: Mood normal        Lisa Orellana MD

## 2022-12-12 NOTE — ASSESSMENT & PLAN NOTE
Lab Results   Component Value Date    EGFR 51 11/21/2022    EGFR 61 12/03/2021    EGFR 57 11/23/2020    CREATININE 1 33 (H) 11/21/2022    CREATININE 1 17 12/03/2021    CREATININE 1 24 11/23/2020

## 2022-12-19 DIAGNOSIS — F60.5 OBSESSIVE COMPULSIVE PERSONALITY DISORDER (HCC): Primary | ICD-10-CM

## 2022-12-19 RX ORDER — FLUOXETINE HYDROCHLORIDE 20 MG/1
20 CAPSULE ORAL DAILY
Qty: 90 CAPSULE | Refills: 1 | Status: SHIPPED | OUTPATIENT
Start: 2022-12-19 | End: 2022-12-20 | Stop reason: SDUPTHER

## 2022-12-20 DIAGNOSIS — F60.5 OBSESSIVE COMPULSIVE PERSONALITY DISORDER (HCC): ICD-10-CM

## 2022-12-20 RX ORDER — FLUOXETINE HYDROCHLORIDE 20 MG/1
20 CAPSULE ORAL DAILY
Qty: 90 CAPSULE | Refills: 1 | Status: SHIPPED | OUTPATIENT
Start: 2022-12-20

## 2022-12-21 DIAGNOSIS — F60.5 OBSESSIVE COMPULSIVE PERSONALITY DISORDER (HCC): ICD-10-CM

## 2022-12-21 RX ORDER — FLUOXETINE HYDROCHLORIDE 20 MG/1
20 CAPSULE ORAL DAILY
Qty: 90 CAPSULE | Refills: 1 | OUTPATIENT
Start: 2022-12-21

## 2023-01-03 LAB — EXT SARS-COV-2: POSITIVE

## 2023-01-16 ENCOUNTER — TELEPHONE (OUTPATIENT)
Dept: FAMILY MEDICINE CLINIC | Facility: HOSPITAL | Age: 78
End: 2023-01-16

## 2023-01-16 NOTE — TELEPHONE ENCOUNTER
Patient has new insurance which apparently [employer] does not accept  Patient asking if you can write his scripts for fluoxetine, ambien, imipramine, perphenazine, and trazadone? I did suggest patient call his new insurance provider to find a new [address] provider      pcb

## 2023-01-20 ENCOUNTER — OFFICE VISIT (OUTPATIENT)
Dept: CARDIOLOGY CLINIC | Facility: CLINIC | Age: 78
End: 2023-01-20

## 2023-01-20 VITALS
SYSTOLIC BLOOD PRESSURE: 140 MMHG | WEIGHT: 166.2 LBS | DIASTOLIC BLOOD PRESSURE: 68 MMHG | BODY MASS INDEX: 23.27 KG/M2 | HEIGHT: 71 IN | HEART RATE: 78 BPM

## 2023-01-20 DIAGNOSIS — E78.2 MIXED HYPERLIPIDEMIA: Primary | ICD-10-CM

## 2023-01-20 DIAGNOSIS — I49.1 PAC (PREMATURE ATRIAL CONTRACTION): ICD-10-CM

## 2023-01-20 DIAGNOSIS — I10 ESSENTIAL HYPERTENSION: ICD-10-CM

## 2023-01-20 RX ORDER — ROSUVASTATIN CALCIUM 10 MG/1
10 TABLET, COATED ORAL DAILY
Qty: 90 TABLET | Refills: 3 | Status: SHIPPED | OUTPATIENT
Start: 2023-01-20

## 2023-01-20 RX ORDER — METOPROLOL SUCCINATE 50 MG/1
50 TABLET, EXTENDED RELEASE ORAL DAILY
Qty: 90 TABLET | Refills: 3 | Status: SHIPPED | OUTPATIENT
Start: 2023-01-20

## 2023-01-20 NOTE — PROGRESS NOTES
Cardiology Outpatient Follow-Up Note - Herber Mann 69 y o  male MRN: [id number]      Assessment/Plan:  1  PAC (premature atrial contraction)  Asymptomatic  Continue metoprolol XL 50 mg daily  - metoprolol succinate (TOPROL-XL) 50 mg 24 hr tablet; Take 1 tablet (50 mg total) by mouth daily  Dispense: 90 tablet; Refill: 3    2  Mixed hyperlipidemia  Elevated ASCVD risk, see below  Patient agreeable to start statin for primary prevention of ASCVD  Start rosuvastatin 10 mg daily he will follow this with his PCP     - rosuvastatin (CRESTOR) 10 MG tablet; Take 1 tablet (10 mg total) by mouth daily  Dispense: 90 tablet; Refill: 3    3  Essential hypertension  Goal blood pressure under 140/90  Patient controlled on current regimen  Continue amlodipine 5 mg daily, metoprolol XL 50 mg daily  The 10-year ASCVD risk score (Elio MCMAHAN, et al , 2019) is: 38%    Values used to calculate the score:      Age: 69 years      Sex: Male      Is Non- : No      Diabetic: No      Tobacco smoker: No      Systolic Blood Pressure: 249 mmHg      Is BP treated: Yes      HDL Cholesterol: 40 mg/dL      Total Cholesterol: 238 mg/dL    We will see Debora Faith A Croissette back  as needed    Subjective:     HPI: Herber Mann is a 69y o  year old male tension, hyperlipidemia who initially presented to me on 10/10/2022 due to EKG concerning for atrial fibrillation; on review it  He was having frequent supraventricular ectopy without atrial fibrillation  Repeat EKG in our office on 10/10/2022 showed sinus rhythm with frequent premature supraventricular ectopy, consistent with abnormality in his PCP office  He was minimally symptomatic  We stopped Eliquis and continue metoprolol, increasing to 50 mg daily  He reports tolerating the medication well  No cardiac symptoms  Cardiac Testing:    Holter monitor 10/18/2022 showed 4% supraventricular ectopy burden without atrial fibrillation    Echo 10/18/2022, personally reviewed, normal LV size, low normal LV function EF 50 to 34%, grade 1 diastolic dysfunction, no significant valvular abnormalities      EKGs, personally reviewed:  n/a    Relevant Labs & Results:        ROS:  Review of Systems:  Review of Systems   Constitutional: Negative for activity change and unexpected weight change  HENT: Negative for facial swelling  Eyes: Negative for visual disturbance  Respiratory: Negative for cough and chest tightness  Cardiovascular: Negative for chest pain  Gastrointestinal: Negative for blood in stool  Musculoskeletal: Negative for myalgias  Skin: Negative for pallor  Allergic/Immunologic: Negative for immunocompromised state  Neurological: Negative for syncope and light-headedness  Psychiatric/Behavioral: Negative for agitation and hallucinations  Objective:     Vitals:   Vitals:    01/20/23 1118   BP: 140/68   BP Location: Left arm   Patient Position: Sitting   Cuff Size: Standard   Pulse: 78   Weight: 75 4 kg (166 lb 3 2 oz)   Height: 5' 11" (1 803 m)    Body surface area is 1 95 meters squared  Wt Readings from Last 3 Encounters:   01/20/23 75 4 kg (166 lb 3 2 oz)   12/06/22 77 4 kg (170 lb 9 6 oz)   10/18/22 75 3 kg (166 lb)       Physical Exam:  General: Scar Sebastian is a well appearing male, in no acute distress, sitting comfortably  HEENT: moist mucous membranes, EOMI  Neck:  No JVD, supple, trachea midline  Cardiovascular: unremarkable S1/S2, regular rate and rhythm with occasional extrasystole, no murmurs, rubs or gallops  Pulmonary: normal respiratory effort, CTAB  Abdomen: soft and nondistended  Extremities: No lower extremity edema  Warm and well perfused extremities  Neuro: no focal motor deficits, AAOx3 (person, place, time)  Psych: Normal mood and affect, cooperative      Medications (at the START of this encounter):   Outpatient Medications Prior to Visit   Medication Sig Dispense Refill   • amLODIPine (NORVASC) 5 mg tablet TAKE 1 TABLET BY MOUTH TWICE A  tablet 1   • FLUoxetine (PROzac) 20 mg capsule Take 1 capsule (20 mg total) by mouth daily 90 capsule 1   • imipramine (TOFRANIL) 50 mg tablet Take 50 mg by mouth daily at bedtime as needed  4   • linaCLOtide (Linzess) 290 MCG CAPS Take 1 capsule by mouth daily 30 capsule 11   • perphenazine 4 mg tablet Take 4 mg by mouth daily at bedtime  4   • traZODone (DESYREL) 150 mg tablet Take 150 mg by mouth daily at bedtime as needed  4   • valACYclovir (VALTREX) 500 mg tablet Take 1 tablet (500 mg total) by mouth 2 (two) times a day for 5 days 10 tablet 0   • zolpidem (AMBIEN) 10 mg tablet TAKE 1 TABLET BY MOUTH AT BEDTIME AS NEEDED 30 tablet 3   • metoprolol succinate (TOPROL-XL) 50 mg 24 hr tablet TAKE 1 TABLET BY MOUTH EVERY DAY 90 tablet 4   • neomycin-polymyxin-hydrocortisone (CORTISPORIN) otic solution Administer 4 drops to the right ear every 6 (six) hours (Patient not taking: Reported on 1/20/2023) 10 mL 0     No facility-administered medications prior to visit  Time Spent:  Total time (face-to-face and non-face-to-face) spent on today's visit was 20 minutes  This includes preparation for the visits (i e  reviewing test results), performance of a medically appropriate history and examination, and orders for medications, tests or other procedures  This time is exclusive of procedures performed and time spent teaching  This note was completed in part utilizing "[employer] [facility] [employer] voice recognition software  Grammatical errors, random word insertion, spelling mistakes, occasional wrong word or "sound-alike" substitutions and incomplete sentences may be an occasional consequence of the system secondary to software limitations, ambient noise and hardware issues  At the time of dictation, efforts were made to edit, clarify and /or correct errors    Please read the chart carefully and recognize, using context, where substitutions have occurred  If you have any questions or concerns about the context, text or information contained within the body of this dictation, please contact myself, the provider, for further clarification

## 2023-02-21 ENCOUNTER — OFFICE VISIT (OUTPATIENT)
Dept: PSYCHIATRY | Facility: CLINIC | Age: 78
End: 2023-02-21

## 2023-02-21 DIAGNOSIS — F34.1 DYSTHYMIC DISORDER: Primary | ICD-10-CM

## 2023-02-21 DIAGNOSIS — F42.2 MIXED OBSESSIONAL THOUGHTS AND ACTS: ICD-10-CM

## 2023-02-21 DIAGNOSIS — F22: ICD-10-CM

## 2023-02-21 RX ORDER — FLUOXETINE HYDROCHLORIDE 20 MG/1
20 CAPSULE ORAL DAILY
Qty: 90 CAPSULE | Refills: 1 | Status: SHIPPED | OUTPATIENT
Start: 2023-02-21

## 2023-02-21 RX ORDER — IMIPRAMINE HCL 50 MG
50 TABLET ORAL
Qty: 90 TABLET | Refills: 1 | Status: SHIPPED | OUTPATIENT
Start: 2023-02-21

## 2023-02-21 RX ORDER — TRAZODONE HYDROCHLORIDE 150 MG/1
150 TABLET ORAL
Qty: 90 TABLET | Refills: 1 | Status: SHIPPED | OUTPATIENT
Start: 2023-02-21

## 2023-02-21 RX ORDER — ZOLPIDEM TARTRATE 10 MG/1
TABLET ORAL
Qty: 30 TABLET | Refills: 2 | Status: SHIPPED | OUTPATIENT
Start: 2023-02-21

## 2023-02-21 RX ORDER — PERPHENAZINE 4 MG/1
4 TABLET ORAL
Qty: 90 TABLET | Refills: 1 | Status: SHIPPED | OUTPATIENT
Start: 2023-02-21

## 2023-02-21 NOTE — PSYCH
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  He lives by self  Has 2 brothers and close with one of them  OCD symptoms since 16 yo  Pt continues to wash his hands " pretty often" but less frequently than in the past  He no longer steps over the cracks in the sidewalk  He denies checking  Less frequent and less intense obsessive thoughts  Pt also reports hx of paranoia: thinking people talk about him  Pt denies depressed mood  Sleep stable with Ambien  Energy and motivation levels fluctuate  He denies SI/HI  Enjoys listening to rock and classical music  Enjoys talking to one of his brothers on the phone  Anxiety is mild and manageable  Denies AVH or paranoia at this time  Overall stable         Review Of Systems:     Constitutional Negative   ENT Negative   Cardiovascular HTN, Hyperlipidemia   Respiratory Negative   Gastrointestinal Negative   Genitourinary Hx of prostate cancer- in remission since 2017   Musculoskeletal Arthritis    Integumentary Negative   Neurological R- hand tremor- PCP aware   Endocrine Negative     Past Medical History:   Patient Active Problem List   Diagnosis   • Posadas esophagus   • Constipation   • Degeneration, intervertebral disc, thoracic   • Erectile dysfunction of non-organic origin   • Esophagitis, reflux   • Mixed hyperlipidemia   • Lumbar radiculopathy   • 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HCC)   • Osteopenia   • Hemangioma of skin and subcutaneous tissue   • Prostate cancer (HCC)   • Increased frequency of urination   • Venous stasis dermatitis of left lower extremity   • Essential hypertension   • Acute hemorrhagic otitis externa   • Herpes simplex   • Reactive otitis externa of left ear   • 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[facility])   • Iron deficiency anemia due to chronic blood loss   • 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[facility])   • Speech articulation disorder   • Light-headed feeling   • Stasis ulcer of ankle, left ([facility])   • Stage 3a chronic kidney disease ([facility])   • Delusional disorder in remission ([facility] [address] )       Allergies: No Known Allergies    Past Surgical History:   Past Surgical History:   Procedure Laterality Date   • COLONOSCOPY  10/12/2017     unremarkable  A 10 year recall advised  • HERNIA REPAIR     • TONSILECTOMY AND ADNOIDECTOMY         Past Psychiatric History: Hetal Salgado at [facility]    Family Psychiatric History: pt denies     Social History: single, no children, lives by self    Substance Abuse History: pt denies     Traumatic History: hx of alcohol abuse, now drinks wine every few months    The following portions of the patient's history were reviewed and updated as appropriate: past family history, past medical history, past social history, past surgical history and problem list     Objective: There were no vitals filed for this visit  Weight (last 2 days)     None          Mental status:  Appearance Casually dressed, thin   Mood Mildly anxious   Affect Mood congruent   Speech Soft, fluent, coherent    Thought Processes Linear and goal directed   Associations intact associations   Hallucinations Denies any auditory or visual hallucinations   Thought Content No passive or active suicidal or homicidal ideation, intent, or plan   Orientation Oriented to person, place, time, and situation   Recent and Remote Memory Grossly intact   Attention Span and Concentration Concentration intact   Intellect Appears to be of Average Intelligence   Insight Insight intact   Judgement judgment was intact   Muscle Strength Not tested, R hand tremor   Language Within normal limits   Fund of Knowledge Age appropriate   Pain NA     PHQ-A Depression Screening              ? Assessment/Plan: stable symptoms/ continue current medications: Ambien 10 mg hs prn, Trazodone 150 mg hs prn, Perphenazine 4 mg hs, Imipramine 50 mg hs, Prozac 20 mg am  Follow up in 3 mos     Diagnosis: Dysthymia, OCD, Delusional Sung in remission      Risks, Benefits And Possible Side Effects Of Medications:  Risks, benefits, and possible side effects of medications explained to patient and family, they verbalize understanding    Controlled Medication Discussion: Discussed with patient Black Box warning on concurrent use of benzodiazepines and opioid medications including sedation, respiratory depression, coma and death  Patient understands the risk of treatment with benzodiazepines in addition to opioids and wants to continue taking those medications  Psychotherapy Provided: Supportive psychotherapy provided  Yes  Medication education provided to [facility]        Visit Time    Visit Start Time: 11:08  Visit Stop Time: 11:38  Total Visit Duration: 30 minutes

## 2023-04-18 ENCOUNTER — OFFICE VISIT (OUTPATIENT)
Dept: FAMILY MEDICINE CLINIC | Facility: HOSPITAL | Age: 78
End: 2023-04-18

## 2023-04-18 VITALS
HEART RATE: 71 BPM | SYSTOLIC BLOOD PRESSURE: 143 MMHG | HEIGHT: 70 IN | TEMPERATURE: 96.5 F | DIASTOLIC BLOOD PRESSURE: 83 MMHG | WEIGHT: 178.2 LBS | OXYGEN SATURATION: 97 % | BODY MASS INDEX: 25.51 KG/M2

## 2023-04-18 DIAGNOSIS — M65.9 SAPHO SYNDROME (HCC): ICD-10-CM

## 2023-04-18 DIAGNOSIS — E78.2 MIXED HYPERLIPIDEMIA: ICD-10-CM

## 2023-04-18 DIAGNOSIS — F60.5 OBSESSIVE COMPULSIVE PERSONALITY DISORDER (HCC): ICD-10-CM

## 2023-04-18 DIAGNOSIS — L70.9 SAPHO SYNDROME (HCC): ICD-10-CM

## 2023-04-18 DIAGNOSIS — D50.0 IRON DEFICIENCY ANEMIA DUE TO CHRONIC BLOOD LOSS: ICD-10-CM

## 2023-04-18 DIAGNOSIS — C61 PROSTATE CANCER (HCC): ICD-10-CM

## 2023-04-18 DIAGNOSIS — M86.9 SAPHO SYNDROME (HCC): ICD-10-CM

## 2023-04-18 DIAGNOSIS — N18.31 STAGE 3A CHRONIC KIDNEY DISEASE (HCC): ICD-10-CM

## 2023-04-18 DIAGNOSIS — M85.80 SAPHO SYNDROME (HCC): ICD-10-CM

## 2023-04-18 DIAGNOSIS — L40.3 SAPHO SYNDROME (HCC): ICD-10-CM

## 2023-04-18 DIAGNOSIS — G21.19 PARKINSONISM DUE TO DRUGS (HCC): ICD-10-CM

## 2023-04-18 DIAGNOSIS — I10 ESSENTIAL HYPERTENSION: ICD-10-CM

## 2023-04-18 DIAGNOSIS — K22.70 BARRETT'S ESOPHAGUS WITHOUT DYSPLASIA: ICD-10-CM

## 2023-04-18 DIAGNOSIS — R19.7 DIARRHEA, UNSPECIFIED TYPE: Primary | ICD-10-CM

## 2023-04-18 NOTE — PROGRESS NOTES
Name: Pam Bustamante      : 1945      MRN: [id number]  Encounter Provider: Ira Coffey MD  Encounter Date: 2023   Encounter department: [facility]      1  Diarrhea, unspecified type  Assessment & Plan:  Reduce use of Linzess until more formed stools      2  Essential hypertension  -     CBC and differential; Future  -     Comprehensive metabolic panel; Future  -     TSH, 3rd generation with Free T4 reflex; Future    3  Prostate cancer [facility])  Assessment & Plan:  ELIOT    Orders:  -     PSA Total, Diagnostic; Future    4  Stage 3a chronic kidney disease [facility])  Assessment & Plan:  Lab Results   Component Value Date    EGFR 51 2022    EGFR 61 2021    EGFR 57 2020    CREATININE 1 33 (H) 2022    CREATININE 1 17 2021    CREATININE 1 24 2020         5  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[address] )    6  Posadas's esophagus without dysplasia    7  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[facility])  Assessment & Plan:  Stable on current meds  We can fill as same until connected with psych      8  Iron deficiency anemia due to chronic blood loss  -     Iron Saturation %; Future  -     Iron; Future    9  Mixed hyperlipidemia  -     Lipid Panel with Direct LDL reflex; Future    10  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HCC)    BMI Counseling: Body mass index is 25 94 kg/m²  The BMI is above normal  Nutrition recommendations include decreasing portion sizes, encouraging healthy choices of fruits and vegetables, limiting drinks that contain sugar and moderation in carbohydrate intake  Exercise recommendations include exercising 3-5 times per week  No pharmacotherapy was ordered  Rationale for BMI follow-up plan is due to patient being overweight or obese           Subjective     Follow up medical issues    Had a fall in [place] in [place] and had multiple scans in [facility] ER    Having loose bowels  Stools are dark all the time  No red blood in stool  Taking Linzess daily and Miralax 3 times a week    Medication list reviewed and revised    Review of Systems   Constitutional: Positive for fatigue  Negative for unexpected weight change  Eyes: Negative for visual disturbance  Respiratory: Negative  Cardiovascular: Negative  Gastrointestinal: Positive for diarrhea  Musculoskeletal: Positive for arthralgias  Neurological: Negative  Psychiatric/Behavioral: Negative  All other systems reviewed and are negative  Past Medical History:   Diagnosis Date   • Anxiety    • Cancer [facility])    • Depression    • ED (erectile dysfunction)    • GERD (gastroesophageal reflux disease)    • OCD (obsessive compulsive disorder)      Past Surgical History:   Procedure Laterality Date   • COLONOSCOPY  10/12/2017     unremarkable  A 10 year recall advised     • HERNIA REPAIR     • TONSILECTOMY AND ADNOIDECTOMY       Family History   Problem Relation Age of Onset   • Depression Father    • Hypertension Mother    • Colon cancer Neg Hx    • Colon polyps Neg Hx      Social History     Socioeconomic History   • Marital status: Single     Spouse name: None   • Number of children: None   • Years of education: None   • Highest education level: None   Occupational History   • None   Tobacco Use   • Smoking status: Former   • Smokeless tobacco: Never   Vaping Use   • Vaping Use: Never used   Substance and Sexual Activity   • Alcohol use: Yes     Comment: Rarely   • Drug use: No   • Sexual activity: None   Other Topics Concern   • None   Social History Narrative    Always uses seat belt - Denied     Social Determinants of Health     Financial Resource Strain: Not on file   Food Insecurity: Not on file   Transportation Needs: Not on file   Physical Activity: Not on file   Stress: Not on file   Social Connections: Not on file   Intimate Partner Violence: Not on file   Housing Stability: Not on file     Current Outpatient Medications on File Prior to Visit   Medication Sig   • amLODIPine "(NORVASC) 5 mg tablet TAKE 1 TABLET BY MOUTH TWICE A DAY   • FLUoxetine (PROzac) 20 mg capsule Take 1 capsule (20 mg total) by mouth daily   • imipramine (TOFRANIL) 50 mg tablet Take 1 tablet (50 mg total) by mouth daily at bedtime   • linaCLOtide (Linzess) 290 MCG CAPS Take 1 capsule by mouth daily   • metoprolol succinate (TOPROL-XL) 50 mg 24 hr tablet Take 1 tablet (50 mg total) by mouth daily   • perphenazine 4 mg tablet Take 1 tablet (4 mg total) by mouth daily at bedtime   • rosuvastatin (CRESTOR) 10 MG tablet Take 1 tablet (10 mg total) by mouth daily   • traZODone (DESYREL) 150 mg tablet Take 1 tablet (150 mg total) by mouth daily at bedtime as needed for sleep   • zolpidem (AMBIEN) 10 mg tablet Take one tablet at bedtime as needed for sleep     No Known Allergies  Immunization History   Administered Date(s) Administered   • COVID-19 PFIZER VACCINE 0 3 ML IM 05/27/2021, 06/17/2021, 05/11/2022   • INFLUENZA 12/04/2017   • Influenza Split High Dose Preservative Free IM 12/02/2013, 12/01/2014, 11/30/2015, 09/07/2016, 12/04/2017   • Influenza, high dose seasonal 0 7 mL 11/02/2018, 12/03/2019, 12/01/2020, 12/07/2021, 12/06/2022   • Pneumococcal Conjugate 13-Valent 11/30/2015   • Pneumococcal Polysaccharide PPV23 06/04/2014   • Zoster 05/29/2013       Objective     /83 (BP Location: Left arm, Patient Position: Sitting, Cuff Size: Standard)   Pulse 71   Temp (!) 96 5 °F (35 8 °C) (Tympanic)   Ht 5' 9 5\" (1 765 m)   Wt 80 8 kg (178 lb 3 2 oz)   SpO2 97%   BMI 25 94 kg/m²     Physical Exam  Vitals and nursing note reviewed  Constitutional:       Appearance: Normal appearance  Cardiovascular:      Rate and Rhythm: Regular rhythm  Pulses: Normal pulses  Heart sounds: Normal heart sounds  Pulmonary:      Breath sounds: Normal breath sounds  Musculoskeletal:      Right lower leg: No edema  Left lower leg: No edema  Skin:     Findings: No rash     Neurological:      Mental Status: He is " oriented to person, place, and time     Psychiatric:         Mood and Affect: Mood normal        Leatha Jacobo MD

## 2023-04-24 ENCOUNTER — APPOINTMENT (OUTPATIENT)
Dept: LAB | Facility: HOSPITAL | Age: 78
End: 2023-04-24

## 2023-04-24 DIAGNOSIS — I10 ESSENTIAL HYPERTENSION: ICD-10-CM

## 2023-04-24 DIAGNOSIS — C61 PROSTATE CANCER (HCC): ICD-10-CM

## 2023-04-24 DIAGNOSIS — D50.0 IRON DEFICIENCY ANEMIA DUE TO CHRONIC BLOOD LOSS: ICD-10-CM

## 2023-04-24 DIAGNOSIS — E78.2 MIXED HYPERLIPIDEMIA: ICD-10-CM

## 2023-04-24 LAB
BASOPHILS # BLD AUTO: 0.06 THOUSANDS/ΜL (ref 0–0.1)
BASOPHILS NFR BLD AUTO: 1 % (ref 0–1)
EOSINOPHIL # BLD AUTO: 0.49 THOUSAND/ΜL (ref 0–0.61)
EOSINOPHIL NFR BLD AUTO: 5 % (ref 0–6)
ERYTHROCYTE [DISTWIDTH] IN BLOOD BY AUTOMATED COUNT: 14.9 % (ref 11.6–15.1)
HCT VFR BLD AUTO: 33.1 % (ref 36.5–49.3)
HGB BLD-MCNC: 10 G/DL (ref 12–17)
IMM GRANULOCYTES # BLD AUTO: 0.06 THOUSAND/UL (ref 0–0.2)
IMM GRANULOCYTES NFR BLD AUTO: 1 % (ref 0–2)
LYMPHOCYTES # BLD AUTO: 1.59 THOUSANDS/ΜL (ref 0.6–4.47)
LYMPHOCYTES NFR BLD AUTO: 17 % (ref 14–44)
MCH RBC QN AUTO: 23.5 PG (ref 26.8–34.3)
MCHC RBC AUTO-ENTMCNC: 30.2 G/DL (ref 31.4–37.4)
MCV RBC AUTO: 78 FL (ref 82–98)
MONOCYTES # BLD AUTO: 0.66 THOUSAND/ΜL (ref 0.17–1.22)
MONOCYTES NFR BLD AUTO: 7 % (ref 4–12)
NEUTROPHILS # BLD AUTO: 6.29 THOUSANDS/ΜL (ref 1.85–7.62)
NEUTS SEG NFR BLD AUTO: 69 % (ref 43–75)
NRBC BLD AUTO-RTO: 0 /100 WBCS
PLATELET # BLD AUTO: 261 THOUSANDS/UL (ref 149–390)
PMV BLD AUTO: 11.8 FL (ref 8.9–12.7)
RBC # BLD AUTO: 4.26 MILLION/UL (ref 3.88–5.62)
WBC # BLD AUTO: 9.15 THOUSAND/UL (ref 4.31–10.16)

## 2023-04-25 LAB
ALBUMIN SERPL BCP-MCNC: 3.6 G/DL (ref 3.5–5)
ALP SERPL-CCNC: 102 U/L (ref 46–116)
ALT SERPL W P-5'-P-CCNC: 28 U/L (ref 12–78)
ANION GAP SERPL CALCULATED.3IONS-SCNC: 8 MMOL/L (ref 4–13)
AST SERPL W P-5'-P-CCNC: 18 U/L (ref 5–45)
BILIRUB SERPL-MCNC: 0.36 MG/DL (ref 0.2–1)
BUN SERPL-MCNC: 18 MG/DL (ref 5–25)
CALCIUM SERPL-MCNC: 8.9 MG/DL (ref 8.3–10.1)
CHLORIDE SERPL-SCNC: 109 MMOL/L (ref 96–108)
CHOLEST SERPL-MCNC: 155 MG/DL
CO2 SERPL-SCNC: 23 MMOL/L (ref 21–32)
CREAT SERPL-MCNC: 1.38 MG/DL (ref 0.6–1.3)
GFR SERPL CREATININE-BSD FRML MDRD: 49 ML/MIN/1.73SQ M
GLUCOSE P FAST SERPL-MCNC: 94 MG/DL (ref 65–99)
HDLC SERPL-MCNC: 48 MG/DL
IRON SATN MFR SERPL: 6 % (ref 20–50)
IRON SERPL-MCNC: 26 UG/DL (ref 65–175)
LDLC SERPL CALC-MCNC: 93 MG/DL (ref 0–100)
POTASSIUM SERPL-SCNC: 3.5 MMOL/L (ref 3.5–5.3)
PROT SERPL-MCNC: 7.6 G/DL (ref 6.4–8.4)
PSA SERPL-MCNC: <0.1 NG/ML (ref 0–4)
SODIUM SERPL-SCNC: 140 MMOL/L (ref 135–147)
TIBC SERPL-MCNC: 446 UG/DL (ref 250–450)
TRIGL SERPL-MCNC: 72 MG/DL
TSH SERPL DL<=0.05 MIU/L-ACNC: 1.27 UIU/ML (ref 0.45–4.5)

## 2023-05-16 ENCOUNTER — OFFICE VISIT (OUTPATIENT)
Dept: PSYCHIATRY | Facility: CLINIC | Age: 78
End: 2023-05-16

## 2023-05-16 DIAGNOSIS — F22: Primary | ICD-10-CM

## 2023-05-16 DIAGNOSIS — F34.1 DYSTHYMIC DISORDER: ICD-10-CM

## 2023-05-16 DIAGNOSIS — F42.2 MIXED OBSESSIONAL THOUGHTS AND ACTS: ICD-10-CM

## 2023-05-16 RX ORDER — ZOLPIDEM TARTRATE 10 MG/1
TABLET ORAL
Qty: 30 TABLET | Refills: 2 | Status: SHIPPED | OUTPATIENT
Start: 2023-05-16

## 2023-05-16 RX ORDER — FLUOXETINE HYDROCHLORIDE 20 MG/1
20 CAPSULE ORAL DAILY
Qty: 90 CAPSULE | Refills: 1 | Status: SHIPPED | OUTPATIENT
Start: 2023-05-16

## 2023-05-16 RX ORDER — IMIPRAMINE HCL 50 MG
50 TABLET ORAL
Qty: 90 TABLET | Refills: 1 | Status: SHIPPED | OUTPATIENT
Start: 2023-05-16

## 2023-05-16 RX ORDER — PERPHENAZINE 4 MG/1
4 TABLET ORAL
Qty: 90 TABLET | Refills: 1 | Status: SHIPPED | OUTPATIENT
Start: 2023-05-16

## 2023-05-16 RX ORDER — TRAZODONE HYDROCHLORIDE 150 MG/1
150 TABLET ORAL
Qty: 90 TABLET | Refills: 1 | Status: SHIPPED | OUTPATIENT
Start: 2023-05-16

## 2023-05-16 NOTE — PSYCH
"Psychiatric Medication Management - [address] Sethi 69 y o  male MRN: [id number]        This note was not shared with the patient due to reasonable likelihood of causing patient harm    Reason for Visit: med check    Subjective: Former pt of [facility] and treated for OCD and Dysthymia with Ambien 10 mg hs prn, Trazodone 150 mg hs prn, Perphenazine 4 mg hs, Imipramine 50 mg hs, Prozac 20 mg am  OCD symptoms since 16 yo  Pt tolerates medications well  No evidence of EPS/TD  He reports stable mood  Socializes with neighbors  Attended brother's birthday this past weekend  Denies SI/HI  Pt continues to wash his hands excessively but \" not like it used to be  \" He denies checking  Less frequent and less intense obsessive thoughts  Pt also reports hx of paranoia: thinking people talk about him  He denies AVH or paranoia at this time  Sleep stable with Ambien  Energy and motivation levels fluctuate  He denies SI/HI  Enjoys listening to rock and classical music  Enjoys talking to one of his brothers on the phone  Anxiety is mild and manageable       Review Of Systems:     Constitutional Negative   ENT Negative   Cardiovascular HTN, Hyperlipidemia   Respiratory Negative   Gastrointestinal Negative   Genitourinary Hx of prostate cancer- in remission since 2017   Musculoskeletal Arthritis    Integumentary Negative   Neurological R- hand tremor- PCP aware   Endocrine Negative     Past Medical History:   Patient Active Problem List   Diagnosis   • Posadas esophagus   • Constipation   • Degeneration, intervertebral disc, thoracic   • Erectile dysfunction of non-organic origin   • Esophagitis, reflux   • Mixed hyperlipidemia   • Lumbar radiculopathy   • 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HCC)   • Osteopenia   • Hemangioma of skin and subcutaneous tissue   • Prostate cancer (HCC)   • Increased frequency of urination   • Venous stasis dermatitis of left lower extremity   • Essential hypertension   • Acute " hemorrhagic otitis externa   • Herpes simplex   • Reactive otitis externa of left ear   • 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HCC)   • Iron deficiency anemia due to chronic blood loss   • 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[facility])   • Speech articulation disorder   • Light-headed feeling   • Stasis ulcer of ankle, left (HCC)   • Stage 3a chronic kidney disease (HCC)   • Delusional disorder in remission ([facility] [address] )   • Diarrhea       Allergies: No Known Allergies    Past Surgical History:   Past Surgical History:   Procedure Laterality Date   • COLONOSCOPY  10/12/2017     unremarkable  A 10 year recall advised  • HERNIA REPAIR     • TONSILECTOMY AND ADNOIDECTOMY         Past Psychiatric History: Patti Dance at [facility]    Family Psychiatric History: pt denies     Social History: single, no children, lives by self    Substance Abuse History: pt denies     Traumatic History: hx of alcohol abuse, now drinks wine every few months    The following portions of the patient's history were reviewed and updated as appropriate: past family history, past medical history, past social history, past surgical history and problem list     Objective: There were no vitals filed for this visit        Weight (last 2 days)     None          Mental status:  Appearance Casually dressed, thin   Mood Mildly anxious   Affect Mood congruent   Speech Soft, fluent, coherent    Thought Processes Linear and goal directed   Associations intact associations   Hallucinations Denies any auditory or visual hallucinations   Thought Content No passive or active suicidal or homicidal ideation, intent, or plan   Orientation Oriented to person, place, time, and situation   Recent and Remote Memory Grossly intact   Attention Span and Concentration Concentration intact   Intellect Appears to be of Average Intelligence   Insight Insight intact   Judgement judgment was intact   Muscle Strength Not tested, R hand tremor   Language Within normal limits   Fund of Knowledge Age appropriate Pain NA     PHQ-A Depression Screening              ? Assessment/Plan: stable symptoms/ continue current medications: Ambien 10 mg hs prn, Trazodone 150 mg hs prn, Perphenazine 4 mg hs, Imipramine 50 mg hs, Prozac 20 mg am  Pt aware this prescriber leaving and he will follow up with [facility] in 3 mos  Diagnosis: Dysthymia, OCD, Delusional Sung in remission    Risks, Benefits And Possible Side Effects Of Medications:  Risks, benefits, and possible side effects of medications explained to patient and family, they verbalize understanding    Controlled Medication Discussion: Discussed with patient Black Box warning on concurrent use of benzodiazepines and opioid medications including sedation, respiratory depression, coma and death  Patient understands the risk of treatment with benzodiazepines in addition to opioids and wants to continue taking those medications  Psychotherapy Provided: Supportive psychotherapy provided  Yes  Medication education provided to [facility]        Visit Time    Visit Start Time: 11:08  Visit Stop Time: 11:38  Total Visit Duration: 30 minutes

## 2023-06-05 ENCOUNTER — RA CDI HCC (OUTPATIENT)
Dept: OTHER | Facility: HOSPITAL | Age: 78
End: 2023-06-05

## 2023-06-05 NOTE — PROGRESS NOTES
[address]  coding opportunities       Chart reviewed, no opportunity found: CHART REVIEWED, NO OPPORTUNITY FOUND        Patients Insurance     Medicare Insurance: Medicare

## 2023-06-09 ENCOUNTER — OFFICE VISIT (OUTPATIENT)
Dept: FAMILY MEDICINE CLINIC | Facility: HOSPITAL | Age: 78
End: 2023-06-09
Payer: MEDICARE

## 2023-06-09 VITALS
HEART RATE: 82 BPM | BODY MASS INDEX: 24.89 KG/M2 | WEIGHT: 177.8 LBS | OXYGEN SATURATION: 100 % | HEIGHT: 71 IN | DIASTOLIC BLOOD PRESSURE: 84 MMHG | SYSTOLIC BLOOD PRESSURE: 159 MMHG | TEMPERATURE: 97.8 F

## 2023-06-09 DIAGNOSIS — K22.70 BARRETT'S ESOPHAGUS WITHOUT DYSPLASIA: ICD-10-CM

## 2023-06-09 DIAGNOSIS — I10 ESSENTIAL HYPERTENSION: ICD-10-CM

## 2023-06-09 DIAGNOSIS — D50.0 IRON DEFICIENCY ANEMIA DUE TO CHRONIC BLOOD LOSS: ICD-10-CM

## 2023-06-09 DIAGNOSIS — G21.19 PARKINSONISM DUE TO DRUGS (HCC): ICD-10-CM

## 2023-06-09 DIAGNOSIS — F60.5 OBSESSIVE COMPULSIVE PERSONALITY DISORDER (HCC): ICD-10-CM

## 2023-06-09 DIAGNOSIS — C61 PROSTATE CANCER (HCC): ICD-10-CM

## 2023-06-09 DIAGNOSIS — Z00.00 MEDICARE ANNUAL WELLNESS VISIT, SUBSEQUENT: Primary | ICD-10-CM

## 2023-06-09 PROCEDURE — 99214 OFFICE O/P EST MOD 30 MIN: CPT | Performed by: FAMILY MEDICINE

## 2023-06-09 PROCEDURE — G0439 PPPS, SUBSEQ VISIT: HCPCS | Performed by: FAMILY MEDICINE

## 2023-06-09 RX ORDER — CARBIDOPA AND LEVODOPA 25; 100 MG/1; MG/1
1 TABLET, EXTENDED RELEASE ORAL 2 TIMES DAILY
Qty: 60 TABLET | Refills: 3 | Status: SHIPPED | OUTPATIENT
Start: 2023-06-09

## 2023-06-09 NOTE — PATIENT INSTRUCTIONS
Medicare Preventive Visit Patient Instructions  Thank you for completing your Welcome to Medicare Visit or Medicare Annual Wellness Visit today  Your next wellness visit will be due in one year (6/9/2024)  The screening/preventive services that you may require over the next 5-10 years are detailed below  Some tests may not apply to you based off risk factors and/or age  Screening tests ordered at today's visit but not completed yet may show as past due  Also, please note that scanned in results may not display below  Preventive Screenings:  Service Recommendations Previous Testing/Comments   Colorectal Cancer Screening  · Colonoscopy    · Fecal Occult Blood Test (FOBT)/Fecal Immunochemical Test (FIT)  · Fecal DNA/Cologuard Test  · Flexible Sigmoidoscopy Age: 39-70 years old   Colonoscopy: every 10 years (May be performed more frequently if at higher risk)  OR  FOBT/FIT: every 1 year  OR  Cologuard: every 3 years  OR  Sigmoidoscopy: every 5 years  Screening may be recommended earlier than age 39 if at higher risk for colorectal cancer  Also, an individualized decision between you and your healthcare provider will decide whether screening between the ages of 74-80 would be appropriate   Colonoscopy: 07/12/2017  FOBT/FIT: Not on file  Cologuard: Not on file  Sigmoidoscopy: Not on file          Prostate Cancer Screening Individualized decision between patient and health care provider in men between ages of 53-78   Medicare will cover every 12 months beginning on the day after your 50th birthday PSA: <0 1 ng/mL     History Prostate Cancer  Screening Not Indicated     Hepatitis C Screening Once for adults born between 1945 and 1965  More frequently in patients at high risk for Hepatitis C Hep C Antibody: 11/23/2020    Screening Current   Diabetes Screening 1-2 times per year if you're at risk for diabetes or have pre-diabetes Fasting glucose: 94 mg/dL (4/24/2023)  A1C: No results in last 5 years (No results in last 5 years)  Screening Current   Cholesterol Screening Once every 5 years if you don't have a lipid disorder  May order more often based on risk factors  Lipid panel: 04/24/2023  Screening Not Indicated  History Lipid Disorder      Other Preventive Screenings Covered by Medicare:  1  Abdominal Aortic Aneurysm (AAA) Screening: covered once if your at risk  You're considered to be at risk if you have a family history of AAA or a male between the age of 73-68 who smoking at least 100 cigarettes in your lifetime  2  Lung Cancer Screening: covers low dose CT scan once per year if you meet all of the following conditions: (1) Age 50-69; (2) No signs or symptoms of lung cancer; (3) Current smoker or have quit smoking within the last 15 years; (4) You have a tobacco smoking history of at least 20 pack years (packs per day x number of years you smoked); (5) You get a written order from a healthcare provider  3  Glaucoma Screening: covered annually if you're considered high risk: (1) You have diabetes OR (2) Family history of glaucoma OR (3)  aged 48 and older OR (3)  American aged 72 and older  3  Osteoporosis Screening: covered every 2 years if you meet one of the following conditions: (1) Have a vertebral abnormality; (2) On glucocorticoid therapy for more than 3 months; (3) Have primary hyperparathyroidism; (4) On osteoporosis medications and need to assess response to drug therapy  5  HIV Screening: covered annually if you're between the age of 12-76  Also covered annually if you are younger than 13 and older than 72 with risk factors for HIV infection  For pregnant patients, it is covered up to 3 times per pregnancy      Immunizations:  Immunization Recommendations   Influenza Vaccine Annual influenza vaccination during flu season is recommended for all persons aged >= 6 months who do not have contraindications   Pneumococcal Vaccine   * Pneumococcal conjugate vaccine = PCV13 (Prevnar 13), PCV15 (Vaxneuvance), PCV20 (Prevnar 20)  * Pneumococcal polysaccharide vaccine = PPSV23 (Pneumovax) Adults 2364 years old: 1-3 doses may be recommended based on certain risk factors  Adults 72 years old: 1-2 doses may be recommended based off what pneumonia vaccine you previously received   Hepatitis B Vaccine 3 dose series if at intermediate or high risk (ex: diabetes, end stage renal disease, liver disease)   Tetanus (Td) Vaccine - COST NOT COVERED BY MEDICARE PART B Following completion of primary series, a booster dose should be given every 10 years to maintain immunity against tetanus  Td may also be given as tetanus wound prophylaxis  Tdap Vaccine - COST NOT COVERED BY MEDICARE PART B Recommended at least once for all adults  For pregnant patients, recommended with each pregnancy  Shingles Vaccine (Shingrix) - COST NOT COVERED BY MEDICARE PART B  2 shot series recommended in those aged 48 and above     Health Maintenance Due:      Topic Date Due   • Hepatitis C Screening  Completed   • Colorectal Cancer Screening  Discontinued     Immunizations Due:      Topic Date Due   • COVID-19 Vaccine (4 - Pfizer series) 07/06/2022     Advance Directives   What are advance directives? Advance directives are legal documents that state your wishes and plans for medical care  These plans are made ahead of time in case you lose your ability to make decisions for yourself  Advance directives can apply to any medical decision, such as the treatments you want, and if you want to donate organs  What are the types of advance directives? There are many types of advance directives, and each state has rules about how to use them  You may choose a combination of any of the following:  · Living will: This is a written record of the treatment you want  You can also choose which treatments you do not want, which to limit, and which to stop at a certain time  This includes surgery, medicine, IV fluid, and tube feedings     · Durable power of  for healthcare [facility]): This is a written record that states who you want to make healthcare choices for you when you are unable to make them for yourself  This person, called a proxy, is usually a family member or a friend  You may choose more than 1 proxy  · Do not resuscitate (DNR) order:  A DNR order is used in case your heart stops beating or you stop breathing  It is a request not to have certain forms of treatment, such as CPR  A DNR order may be included in other types of advance directives  · Medical directive: This covers the care that you want if you are in a coma, near death, or unable to make decisions for yourself  You can list the treatments you want for each condition  Treatment may include pain medicine, surgery, blood transfusions, dialysis, IV or tube feedings, and a ventilator (breathing machine)  · Values history: This document has questions about your views, beliefs, and how you feel and think about life  This information can help others choose the care that you would choose  Why are advance directives important? An advance directive helps you control your care  Although spoken wishes may be used, it is better to have your wishes written down  Spoken wishes can be misunderstood, or not followed  Treatments may be given even if you do not want them  An advance directive may make it easier for your family to make difficult choices about your care  Fall Prevention    Fall prevention  includes ways to make your home and other areas safer  It also includes ways you can move more carefully to prevent a fall  Health conditions that cause changes in your blood pressure, vision, or muscle strength and coordination may increase your risk for falls  Medicines may also increase your risk for falls if they make you dizzy, weak, or sleepy  Fall prevention tips:   · Stand or sit up slowly  · Use assistive devices as directed      · Wear shoes that fit well and have soles that      · Wear a personal alarm  · Stay active  · Manage your medical conditions  Home Safety Tips:  · Add items to prevent falls in the bathroom  · Keep paths clear  · Install bright lights in your home  · Keep items you use often on shelves within reach  · Paint or place reflective tape on the edges of your stairs  © Copyright [employer] 2018 Information is for End User's use only and may not be sold, redistributed or otherwise used for commercial purposes   All illustrations and images included in CareNotes® are the copyrighted property of A D A M , Inc  or [address] [place] [address] [facility]

## 2023-06-09 NOTE — PROGRESS NOTES
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[facility] )    Medicare annual wellness visit, subsequent - Primary    Iron deficiency anemia due to chronic blood loss     Declining Hgb and indices, need to rule out GI source         Relevant Orders    Ambulatory Referral to Gastroenterology       Depression Screening and Follow-up Plan: Patient was screened for depression during today's encounter  They screened negative with a PHQ-2 score of 1  Falls Plan of Care: balance, strength, and gait training instructions were provided  Preventive health issues were discussed with patient, and age appropriate screening tests were ordered as noted in patient's After Visit Summary  Personalized health advice and appropriate referrals for health education or preventive services given if needed, as noted in patient's After Visit Summary  History of Present Illness:     Patient presents for a Medicare Wellness Visit    AWV and multiple medical issues    Throat clearing need for awhile    Having increase in Parkinsons  Tremor is L sided and affecting writing  He is interested in taking medication for this    Medications reviewed and revised    Discussed labs in detail- copy given  Note decline gradually in Hgb and iron indices  Need to evaluate Barretts history and any chance of GI blood loss     Patient Care Team:  Rabia Smith MD as PCP - General  Rabia Smith MD     Review of Systems:     Review of Systems   Constitutional: Positive for fatigue   Negative for unexpected weight change  HENT: Negative  Respiratory: Negative  Cardiovascular: Negative  Gastrointestinal: Negative  Genitourinary: Negative  Musculoskeletal: Negative  Neurological: Positive for tremors  Negative for headaches  Psychiatric/Behavioral: Negative for agitation, confusion and decreased concentration  The patient is not nervous/anxious  All other systems reviewed and are negative  Problem List:     Patient Active Problem List   Diagnosis   • Posadas esophagus   • Constipation   • Degeneration, intervertebral disc, thoracic   • Erectile dysfunction of non-organic origin   • Esophagitis, reflux   • Mixed hyperlipidemia   • Lumbar radiculopathy   • 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[facility])   • Osteopenia   • Hemangioma of skin and subcutaneous tissue   • Prostate cancer ([place] [address] )   • Increased frequency of urination   • Venous stasis dermatitis of left lower extremity   • Essential hypertension   • Medicare annual wellness visit, subsequent   • Acute hemorrhagic otitis externa   • Herpes simplex   • Reactive otitis externa of left ear   • 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[facility])   • Iron deficiency anemia due to chronic blood loss   • 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[facility])   • Speech articulation disorder   • Light-headed feeling   • Stasis ulcer of ankle, left ([facility])   • Stage 3a chronic kidney disease ([place] [address] )   • Delusional disorder in remission ([place] [address] )   • Diarrhea      Past Medical and Surgical History:     Past Medical History:   Diagnosis Date   • Anxiety    • Cancer [facility])    • Depression    • ED (erectile dysfunction)    • GERD (gastroesophageal reflux disease)    • OCD (obsessive compulsive disorder)      Past Surgical History:   Procedure Laterality Date   • COLONOSCOPY  10/12/2017     unremarkable  A 10 year recall advised     • HERNIA REPAIR     • TONSILECTOMY AND ADNOIDECTOMY        Family History:     Family History   Problem Relation Age of Onset   • Depression Father    • Hypertension Mother    • Colon cancer Neg Hx    • Colon polyps Neg Hx       Social History:     Social History     Socioeconomic History   • Marital status: Single     Spouse name: None   • Number of children: None   • Years of education: None   • Highest education level: None   Occupational History   • None   Tobacco Use   • Smoking status: Former   • Smokeless tobacco: Never   Vaping Use   • Vaping Use: Never used   Substance and Sexual Activity   • Alcohol use: Yes     Comment: Rarely   • Drug use: No   • Sexual activity: None   Other Topics Concern   • None   Social History Narrative    Always uses seat belt - Denied     Social Determinants of Health     Financial Resource Strain: Low Risk  (6/9/2023)    Overall Financial Resource Strain (CARDIA)    • Difficulty of Paying Living Expenses: Not very hard   Food Insecurity: Not on file   Transportation Needs: No Transportation Needs (6/9/2023)    PRAPARE - Transportation    • Lack of Transportation (Medical): No    • Lack of Transportation (Non-Medical):  No   Physical Activity: Not on file   Stress: Not on file   Social Connections: Not on file   Intimate Partner Violence: Not on file   Housing Stability: Not on file      Medications and Allergies:     Current Outpatient Medications   Medication Sig Dispense Refill   • amLODIPine (NORVASC) 5 mg tablet TAKE 1 TABLET BY MOUTH TWICE A  tablet 1   • carbidopa-levodopa (SINEMET CR)  mg TBCR per ER tablet Take 1 tablet by mouth 2 (two) times a day 60 tablet 3   • FLUoxetine (PROzac) 20 mg capsule Take 1 capsule (20 mg total) by mouth daily 90 capsule 1   • imipramine (TOFRANIL) 50 mg tablet Take 1 tablet (50 mg total) by mouth daily at bedtime 90 tablet 1   • linaCLOtide (Linzess) 290 MCG CAPS Take 1 capsule by mouth daily 30 capsule 11   • metoprolol succinate (TOPROL-XL) 50 mg 24 hr tablet Take 1 tablet (50 mg total) by mouth daily 90 tablet 3   • perphenazine 4 mg tablet Take 1 tablet (4 mg total) by mouth daily at bedtime 90 tablet 1   • rosuvastatin (CRESTOR) 10 MG tablet Take 1 tablet (10 mg total) by mouth daily 90 tablet 3   • traZODone (DESYREL) 150 mg tablet Take 1 tablet (150 mg total) by mouth daily at bedtime as needed for sleep 90 tablet 1   • zolpidem (AMBIEN) 10 mg tablet Take one tablet at bedtime as needed for sleep 30 tablet 2     No current facility-administered medications for this visit  No Known Allergies   Immunizations:     Immunization History   Administered Date(s) Administered   • COVID-19 PFIZER VACCINE 0 3 ML IM 05/27/2021, 06/17/2021, 05/11/2022   • INFLUENZA 12/04/2017   • Influenza Split High Dose Preservative Free IM 12/02/2013, 12/01/2014, 11/30/2015, 09/07/2016, 12/04/2017   • Influenza, high dose seasonal 0 7 mL 11/02/2018, 12/03/2019, 12/01/2020, 12/07/2021, 12/06/2022   • Pneumococcal Conjugate 13-Valent 11/30/2015   • Pneumococcal Polysaccharide PPV23 06/04/2014   • Zoster 05/29/2013      Health Maintenance:         Topic Date Due   • Hepatitis C Screening  Completed   • Colorectal Cancer Screening  Discontinued         Topic Date Due   • COVID-19 Vaccine (4 - Pfizer series) 07/06/2022      Medicare Screening Tests and Risk Assessments:     Josefa Keen is here for his Subsequent Wellness visit  Last Medicare Wellness visit information reviewed, patient interviewed and updates made to the record today  Health Risk Assessment:   Patient rates overall health as fair  Patient feels that their physical health rating is slightly worse  Patient is satisfied with their life  Eyesight was rated as same  Hearing was rated as same  Patient feels that their emotional and mental health rating is same  Patients states they are sometimes angry  Patient states they are often unusually tired/fatigued  Pain experienced in the last 7 days has been none  Patient states that he has experienced no weight loss or gain in last 6 months  Depression Screening:   PHQ-2 Score: 1      Fall Risk Screening:    In the past year, patient has experienced: history of falling in past year    Number of falls: 2 or more  Injured during fall?: No    Feels unsteady when standing or walking?: Yes    Worried about falling?: Yes      Home Safety:  Patient does not have trouble with stairs inside or outside of their home  Patient has working smoke alarms and has working carbon monoxide detector  Home safety hazards include: none  Nutrition:   Current diet is Regular  Medications:   Patient is not currently taking any over-the-counter supplements  Patient is able to manage medications  Activities of Daily Living (ADLs)/Instrumental Activities of Daily Living (IADLs):   Walk and transfer into and out of bed and chair?: Yes  Dress and groom yourself?: Yes    Bathe or shower yourself?: Yes    Feed yourself?  Yes  Do your laundry/housekeeping?: Yes  Manage your money, pay your bills and track your expenses?: Yes  Make your own meals?: Yes    Do your own shopping?: Yes    Previous Hospitalizations:   Any hospitalizations or ED visits within the last 12 months?: Yes    How many hospitalizations have you had in the last year?: 1-2    Advance Care Planning:   Living will: No    Durable POA for healthcare: No    Advanced directive: No    Advanced directive counseling given: Yes    Five wishes given: Yes    Patient declined ACP directive: No    End of Life Decisions reviewed with patient: Yes    Provider agrees with end of life decisions: Yes      Cognitive Screening:   Provider or family/friend/caregiver concerned regarding cognition?: No    PREVENTIVE SCREENINGS      Cardiovascular Screening:    General: Screening Not Indicated and History Lipid Disorder      Diabetes Screening:     General: Screening Current      Colorectal Cancer Screening:     General: Risks and Benefits Discussed    Due for: Colonoscopy - Low Risk      Prostate Cancer Screening:    General: History Prostate Cancer and Screening Not Indicated      Osteoporosis "Screening:    General: Risks and Benefits Discussed    Due for: DXA Axial      Abdominal Aortic Aneurysm (AAA) Screening:    Risk factors include: tobacco use        General: Screening Not Indicated      Lung Cancer Screening:     General: Screening Not Indicated      Hepatitis C Screening:    General: Screening Current    Screening, Brief Intervention, and Referral to Treatment (SBIRT)    Screening  Typical number of drinks in a day: 0  Typical number of drinks in a week: 0  Interpretation: Low risk drinking behavior  Single Item Drug Screening:  How often have you used an illegal drug (including marijuana) or a prescription medication for non-medical reasons in the past year? never    Single Item Drug Screen Score: 0  Interpretation: Negative screen for possible drug use disorder    Vision Screening    Right eye Left eye Both eyes   Without correction 20/40 20/70 20/50   With correction           Physical Exam:     /84 (BP Location: Left arm, Patient Position: Sitting, Cuff Size: Standard)   Pulse 82   Temp 97 8 °F (36 6 °C) (Tympanic)   Ht 5' 11\" (1 803 m)   Wt 80 6 kg (177 lb 12 8 oz)   SpO2 100%   BMI 24 80 kg/m²     Physical Exam  Vitals and nursing note reviewed  Constitutional:       Appearance: Normal appearance  Cardiovascular:      Rate and Rhythm: Regular rhythm  Pulses: Normal pulses  Heart sounds: Normal heart sounds  Pulmonary:      Effort: Pulmonary effort is normal       Breath sounds: Normal breath sounds  Musculoskeletal:      Right lower leg: No edema  Left lower leg: No edema  Skin:     Coloration: Skin is pale  Neurological:      Mental Status: He is alert     Psychiatric:         Mood and Affect: Mood normal           Reginaldo Mckinney MD  "

## 2023-06-19 ENCOUNTER — TELEPHONE (OUTPATIENT)
Dept: GASTROENTEROLOGY | Facility: CLINIC | Age: 78
End: 2023-06-19

## 2023-06-19 NOTE — TELEPHONE ENCOUNTER
Patients GI provider:  Dr Becca Archer    Number to return call: [phone]    Reason for call: Pt calling requesting ASAP appointment due to REJI due to chronic blood loss  Please contact patient with sooner available appt      Scheduled procedure/appointment date if applicable: 3/02/6139

## 2023-06-22 ENCOUNTER — TELEPHONE (OUTPATIENT)
Dept: GASTROENTEROLOGY | Facility: CLINIC | Age: 78
End: 2023-06-22

## 2023-06-22 ENCOUNTER — OFFICE VISIT (OUTPATIENT)
Dept: GASTROENTEROLOGY | Facility: CLINIC | Age: 78
End: 2023-06-22
Payer: MEDICARE

## 2023-06-22 VITALS
DIASTOLIC BLOOD PRESSURE: 60 MMHG | BODY MASS INDEX: 25.62 KG/M2 | HEIGHT: 71 IN | WEIGHT: 183 LBS | SYSTOLIC BLOOD PRESSURE: 124 MMHG

## 2023-06-22 DIAGNOSIS — D50.0 IRON DEFICIENCY ANEMIA DUE TO CHRONIC BLOOD LOSS: Primary | ICD-10-CM

## 2023-06-22 DIAGNOSIS — K59.04 CHRONIC IDIOPATHIC CONSTIPATION: ICD-10-CM

## 2023-06-22 DIAGNOSIS — K22.70 BARRETT'S ESOPHAGUS WITHOUT DYSPLASIA: ICD-10-CM

## 2023-06-22 DIAGNOSIS — K21.00 GASTROESOPHAGEAL REFLUX DISEASE WITH ESOPHAGITIS, UNSPECIFIED WHETHER HEMORRHAGE: ICD-10-CM

## 2023-06-22 PROCEDURE — 99214 OFFICE O/P EST MOD 30 MIN: CPT | Performed by: INTERNAL MEDICINE

## 2023-06-22 RX ORDER — POLYETHYLENE GLYCOL 3350, SODIUM SULFATE ANHYDROUS, SODIUM BICARBONATE, SODIUM CHLORIDE, POTASSIUM CHLORIDE 236; 22.74; 6.74; 5.86; 2.97 G/4L; G/4L; G/4L; G/4L; G/4L
4000 POWDER, FOR SOLUTION ORAL ONCE
Qty: 4000 ML | Refills: 0 | Status: SHIPPED | OUTPATIENT
Start: 2023-06-22 | End: 2023-06-22

## 2023-06-22 NOTE — H&P (VIEW-ONLY)
[facility] - Outpatient Follow-up Note  Isabella Pearson 69 y.o. male MRN: [id number]  Encounter: [id number]    ASSESSMENT AND PLAN:      1. Iron deficiency anemia due to chronic blood loss  New onset iron deficiency anemia. Patient with some complaints of black stools in March and April. Some ENT symptoms which may be atypical reflux. Chronic constipation at baseline. Known radiation proctitis but no bright red blood per rectum last EGD 2006. Last colonoscopy 2017  - Colonoscopy; Future  - EGD; Future  - polyethylene glycol (Golytely) 4000 mL solution; Take 4,000 mL by mouth once for 1 dose Take 4000 mL by mouth once for 1 dose. Use as directed  Dispense: 4000 mL; Refill: 0    2. Gastroesophageal reflux disease with esophagitis, unspecified whether hemorrhage  3. Posadas's esophagus without dysplasia  Previous history of GERD and questionable Posadas's. On no medications currently last EGD 2006    4. Chronic idiopathic constipation  Chronic constipation probably medicine and functional related. On Linzess with as needed MiraLAX      Followup Appointment: After EGD and colonoscopy  ______________________________________________________________________    Chief Complaint   Patient presents with   • Anemia     Pt states he is constipated. Pt was diagnosed with iron deficiency and was referred by Dr. Laura Uribe. HPI: Patient is seen in the office today for an iron deficiency anemia. He has a distant history of GERD and questionable Posadas's although biopsies presumably were negative because surveillance was stopped in 2006. He has a history of chronic constipation which is probably medicine related as well as idiopathic constipation. He is doing relatively well taking Linzess every day and MiraLAX as needed. He had routine blood work done and was found to have a microcytic anemia with iron deficiency indices.   He reports in March and April having some black stools but denies any hematemesis or coffee-ground emesis. He denies any bright red blood per rectum. He denies any chest or abdominal pain. He denies any heartburn or indigestion. He denies any dysphagia, odynophagia, early satiety. He does report a chronic cough and a sore throat. His weight is stable. He denies any NSAID use. Historical Information   Past Medical History:   Diagnosis Date   • Anxiety    • Cancer [facility])    • Depression    • ED (erectile dysfunction)    • GERD (gastroesophageal reflux disease)    • OCD (obsessive compulsive disorder)      Past Surgical History:   Procedure Laterality Date   • COLONOSCOPY  10/12/2017     unremarkable. A 10 year recall advised. • HERNIA REPAIR     • TONSILECTOMY AND ADNOIDECTOMY       Social History     Substance and Sexual Activity   Alcohol Use Yes    Comment: Rarely     Social History     Substance and Sexual Activity   Drug Use No     Social History     Tobacco Use   Smoking Status Former   Smokeless Tobacco Never     Family History   Problem Relation Age of Onset   • Depression Father    • Hypertension Mother    • Colon cancer Neg Hx    • Colon polyps Neg Hx          Current Outpatient Medications:   •  amLODIPine (NORVASC) 5 mg tablet  •  carbidopa-levodopa (SINEMET CR)  mg TBCR per ER tablet  •  FLUoxetine (PROzac) 20 mg capsule  •  imipramine (TOFRANIL) 50 mg tablet  •  linaCLOtide (Linzess) 290 MCG CAPS  •  metoprolol succinate (TOPROL-XL) 50 mg 24 hr tablet  •  perphenazine 4 mg tablet  •  polyethylene glycol (Golytely) 4000 mL solution  •  rosuvastatin (CRESTOR) 10 MG tablet  •  traZODone (DESYREL) 150 mg tablet  •  zolpidem (AMBIEN) 10 mg tablet  No Known Allergies  Reviewed medications and allergies and updated as indicated    PHYSICAL EXAM:    Blood pressure 124/60, height 5' 11" (1.803 m), weight 83 kg (183 lb). Body mass index is 25.52 kg/m².   General Appearance: NAD, cooperative, alert  Eyes: Anicteric, PERRLA, EOMI  ENT:  Normocephalic, atraumatic, normal mucosa. Neck:  Supple, symmetrical, trachea midline  Resp:  Clear to auscultation bilaterally; no rales, rhonchi or wheezing; respirations unlabored   CV:  S1 S2, Regular rate and rhythm; no murmur, rub, or gallop. GI:  Soft, non-tender, non-distended; normal bowel sounds; no masses, no organomegaly   Rectal: Deferred  Musculoskeletal: No cyanosis, clubbing or edema. Normal ROM. Skin:  No jaundice, rashes, or lesions   Heme/Lymph: No palpable cervical lymphadenopathy  Psych: Normal affect, good eye contact  Neuro: No gross deficits, AAOx3    Lab Results:   Lab Results   Component Value Date    WBC 9.15 04/24/2023    HGB 10.0 (L) 04/24/2023    HCT 33.1 (L) 04/24/2023    MCV 78 (L) 04/24/2023     04/24/2023     Lab Results   Component Value Date     11/24/2015    K 3.5 04/24/2023     (H) 04/24/2023    CO2 23 04/24/2023    ANIONGAP 1 (L) 11/24/2015    BUN 18 04/24/2023    CREATININE 1.38 (H) 04/24/2023    GLUCOSE 81 11/24/2015    GLUF 94 04/24/2023    CALCIUM 8.9 04/24/2023    CORRECTEDCA 9.4 11/21/2022    AST 18 04/24/2023    ALT 28 04/24/2023    ALKPHOS 102 04/24/2023    PROT 7.2 11/24/2015    BILITOT 0.67 11/24/2015    EGFR 49 04/24/2023     Lab Results   Component Value Date    IRON 26 (L) 04/24/2023    TIBC 446 04/24/2023    FERRITIN 5 (L) 12/06/2022       Radiology Results:   No results found.

## 2023-06-22 NOTE — TELEPHONE ENCOUNTER
Scheduled date of colonoscopy (as of today):7/20/23  Physician performing colonoscopy:JULIO  Location of colonoscopy:[facility]  Bowel prep reviewed with patient:Yun  Instructions reviewed with patient by:YAYA  Clearances: N

## 2023-07-01 DIAGNOSIS — G21.19 PARKINSONISM DUE TO DRUGS (HCC): ICD-10-CM

## 2023-07-01 RX ORDER — CARBIDOPA AND LEVODOPA 25; 100 MG/1; MG/1
TABLET, EXTENDED RELEASE ORAL
Qty: 180 TABLET | Refills: 2 | Status: SHIPPED | OUTPATIENT
Start: 2023-07-01

## 2023-07-05 ENCOUNTER — TELEPHONE (OUTPATIENT)
Dept: GASTROENTEROLOGY | Facility: CLINIC | Age: 78
End: 2023-07-05

## 2023-07-20 ENCOUNTER — HOSPITAL ENCOUNTER (OUTPATIENT)
Dept: GASTROENTEROLOGY | Facility: AMBULATORY SURGERY CENTER | Age: 78
Discharge: HOME | End: 2023-07-20
Payer: MEDICARE

## 2023-07-20 ENCOUNTER — ANESTHESIA (OUTPATIENT)
Dept: GASTROENTEROLOGY | Facility: AMBULATORY SURGERY CENTER | Age: 78
End: 2023-07-20

## 2023-07-20 ENCOUNTER — ANESTHESIA EVENT (OUTPATIENT)
Dept: GASTROENTEROLOGY | Facility: AMBULATORY SURGERY CENTER | Age: 78
End: 2023-07-20

## 2023-07-20 VITALS
DIASTOLIC BLOOD PRESSURE: 69 MMHG | HEART RATE: 61 BPM | RESPIRATION RATE: 16 BRPM | HEIGHT: 71 IN | TEMPERATURE: 97.6 F | OXYGEN SATURATION: 97 % | BODY MASS INDEX: 25.76 KG/M2 | WEIGHT: 184 LBS | SYSTOLIC BLOOD PRESSURE: 157 MMHG

## 2023-07-20 DIAGNOSIS — K21.00 GASTROESOPHAGEAL REFLUX DISEASE WITH ESOPHAGITIS WITHOUT HEMORRHAGE: Primary | ICD-10-CM

## 2023-07-20 DIAGNOSIS — D50.0 IRON DEFICIENCY ANEMIA DUE TO CHRONIC BLOOD LOSS: ICD-10-CM

## 2023-07-20 PROBLEM — G20 PARKINSON DISEASE (HCC): Status: ACTIVE | Noted: 2023-07-20

## 2023-07-20 PROBLEM — H60.552: Status: RESOLVED | Noted: 2018-12-04 | Resolved: 2023-07-20

## 2023-07-20 PROBLEM — G20.A1 PARKINSON DISEASE: Status: ACTIVE | Noted: 2023-07-20

## 2023-07-20 PROBLEM — H60.329: Status: RESOLVED | Noted: 2018-11-02 | Resolved: 2023-07-20

## 2023-07-20 PROCEDURE — 45380 COLONOSCOPY AND BIOPSY: CPT | Performed by: INTERNAL MEDICINE

## 2023-07-20 PROCEDURE — 43239 EGD BIOPSY SINGLE/MULTIPLE: CPT | Performed by: INTERNAL MEDICINE

## 2023-07-20 PROCEDURE — 88305 TISSUE EXAM BY PATHOLOGIST: CPT | Performed by: PATHOLOGY

## 2023-07-20 RX ORDER — OMEPRAZOLE 40 MG/1
40 CAPSULE, DELAYED RELEASE ORAL DAILY
Qty: 30 CAPSULE | Refills: 6 | Status: SHIPPED | OUTPATIENT
Start: 2023-07-20

## 2023-07-20 RX ORDER — PROPOFOL 10 MG/ML
INJECTION, EMULSION INTRAVENOUS AS NEEDED
Status: DISCONTINUED | OUTPATIENT
Start: 2023-07-20 | End: 2023-07-20

## 2023-07-20 RX ORDER — SODIUM CHLORIDE, SODIUM LACTATE, POTASSIUM CHLORIDE, CALCIUM CHLORIDE 600; 310; 30; 20 MG/100ML; MG/100ML; MG/100ML; MG/100ML
50 INJECTION, SOLUTION INTRAVENOUS CONTINUOUS
Status: DISCONTINUED | OUTPATIENT
Start: 2023-07-20 | End: 2023-07-24 | Stop reason: HOSPADM

## 2023-07-20 RX ADMIN — PROPOFOL 100 MG: 10 INJECTION, EMULSION INTRAVENOUS at 13:08

## 2023-07-20 RX ADMIN — PROPOFOL 100 MG: 10 INJECTION, EMULSION INTRAVENOUS at 13:24

## 2023-07-20 RX ADMIN — SODIUM CHLORIDE, SODIUM LACTATE, POTASSIUM CHLORIDE, CALCIUM CHLORIDE: 600; 310; 30; 20 INJECTION, SOLUTION INTRAVENOUS at 13:35

## 2023-07-20 RX ADMIN — SODIUM CHLORIDE, SODIUM LACTATE, POTASSIUM CHLORIDE, CALCIUM CHLORIDE 50 ML/HR: 600; 310; 30; 20 INJECTION, SOLUTION INTRAVENOUS at 12:59

## 2023-07-20 RX ADMIN — PROPOFOL 100 MG: 10 INJECTION, EMULSION INTRAVENOUS at 13:16

## 2023-07-20 RX ADMIN — SODIUM CHLORIDE, SODIUM LACTATE, POTASSIUM CHLORIDE, CALCIUM CHLORIDE: 600; 310; 30; 20 INJECTION, SOLUTION INTRAVENOUS at 12:48

## 2023-07-20 NOTE — ANESTHESIA PREPROCEDURE EVALUATION
Procedure:  COLONOSCOPY  EGD    Relevant Problems   ANESTHESIA (within normal limits)   (-) History of anesthesia complications      CARDIO   (+) Essential hypertension   (+) Mixed hyperlipidemia   (-) Chest pain   (-) GE (dyspnea on exertion)      /RENAL   (+) Prostate cancer (HCC)   (+) Stage 3a chronic kidney disease (HCC)      HEMATOLOGY   (+) Iron deficiency anemia due to chronic blood loss      MUSCULOSKELETAL   (+) 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HCC)      PULMONARY   (-) Shortness of breath   (-) URI (upper respiratory infection)      Nervous and Auditory   (+) Parkinson disease (HCC)      Other   (+) 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HCC)        Physical Exam    Airway    Mallampati score: II  TM Distance: >3 FB  Neck ROM: full     Dental       Cardiovascular      Pulmonary      Other Findings        Anesthesia Plan  ASA Score- 2     Anesthesia Type- IV sedation with anesthesia with ASA Monitors. Additional Monitors:   Airway Plan:           Plan Factors-Exercise tolerance (METS): >4 METS. Chart reviewed. EKG reviewed. Existing labs reviewed. Patient summary reviewed. Induction- intravenous. Postoperative Plan-     Informed Consent- Anesthetic plan and risks discussed with patient. I personally reviewed this patient with the CRNA. Discussed and agreed on the Anesthesia Plan with the CRNA. Verena Babcock

## 2023-07-20 NOTE — ANESTHESIA POSTPROCEDURE EVALUATION
Post-Op Assessment Note    CV Status:  Stable  Pain Score: 0    Pain management: adequate     Mental Status:  Alert and awake   Hydration Status:  Euvolemic   PONV Controlled:  Controlled   Airway Patency:  Patent      Post Op Vitals Reviewed: Yes      Staff: CRNA         No notable events documented.     BP   115/56   Temp   98   Pulse  70   Resp  16   SpO2   100

## 2023-07-24 DIAGNOSIS — I10 ESSENTIAL HYPERTENSION: ICD-10-CM

## 2023-07-24 RX ORDER — AMLODIPINE BESYLATE 5 MG/1
TABLET ORAL
Qty: 180 TABLET | Refills: 1 | Status: SHIPPED | OUTPATIENT
Start: 2023-07-24

## 2023-07-26 PROCEDURE — 88305 TISSUE EXAM BY PATHOLOGIST: CPT | Performed by: PATHOLOGY

## 2023-08-08 PROBLEM — Z00.00 MEDICARE ANNUAL WELLNESS VISIT, SUBSEQUENT: Status: RESOLVED | Noted: 2018-06-04 | Resolved: 2023-08-08

## 2023-08-11 DIAGNOSIS — K21.00 GASTROESOPHAGEAL REFLUX DISEASE WITH ESOPHAGITIS WITHOUT HEMORRHAGE: ICD-10-CM

## 2023-08-11 RX ORDER — OMEPRAZOLE 40 MG/1
40 CAPSULE, DELAYED RELEASE ORAL DAILY
Qty: 90 CAPSULE | Refills: 0 | Status: SHIPPED | OUTPATIENT
Start: 2023-08-11 | End: 2023-09-26

## 2023-08-16 ENCOUNTER — OFFICE VISIT (OUTPATIENT)
Dept: PSYCHIATRY | Facility: CLINIC | Age: 78
End: 2023-08-16
Payer: MEDICARE

## 2023-08-16 DIAGNOSIS — F42.2 MIXED OBSESSIONAL THOUGHTS AND ACTS: ICD-10-CM

## 2023-08-16 DIAGNOSIS — F34.1 DYSTHYMIC DISORDER: ICD-10-CM

## 2023-08-16 PROCEDURE — 99214 OFFICE O/P EST MOD 30 MIN: CPT

## 2023-08-16 RX ORDER — PERPHENAZINE 4 MG/1
4 TABLET ORAL
Qty: 90 TABLET | Refills: 0 | Status: SHIPPED | OUTPATIENT
Start: 2023-08-16

## 2023-08-16 RX ORDER — FLUOXETINE HYDROCHLORIDE 20 MG/1
20 CAPSULE ORAL DAILY
Qty: 90 CAPSULE | Refills: 0 | Status: SHIPPED | OUTPATIENT
Start: 2023-08-16

## 2023-08-16 RX ORDER — TRAZODONE HYDROCHLORIDE 150 MG/1
150 TABLET ORAL
Qty: 90 TABLET | Refills: 0 | Status: SHIPPED | OUTPATIENT
Start: 2023-08-16

## 2023-08-16 RX ORDER — IMIPRAMINE HCL 50 MG
50 TABLET ORAL
Qty: 90 TABLET | Refills: 0 | Status: SHIPPED | OUTPATIENT
Start: 2023-08-16

## 2023-08-16 RX ORDER — ZOLPIDEM TARTRATE 10 MG/1
TABLET ORAL
Qty: 30 TABLET | Refills: 2 | Status: SHIPPED | OUTPATIENT
Start: 2023-08-16

## 2023-08-16 NOTE — PSYCH
Psychiatric Medication Management - [address] 69 y.o. male MRN: [id number]        This note was not shared with the patient due to reasonable likelihood of causing patient harm    Reason for Visit: medication management    Subjective: Former patient of Eri Forman is being treated for OCD and dysthymia. Patient was prescribed Sinemet on 6/9/23 due to L sided tremors, which has been affecting his writing. Patient recently had an endoscopy and colonoscopy done on July 20th and was diagnosed with a hiatal hernia, Christian's esophagus, and GERD. Patient is observed on Ambien 10 mg hs prn, Trazodone 150 mg hs prn, Perphenazine 4 mg hs, Imipramine 50 mg hs, Prozac 20 mg am. OCD symptoms since 16 yo. Patient has been compliant with his medications and denies any side effects including EPS/TD. Patient reports stable mood. Patient reports less frequent handwashing and less intense obsessive thoughts. Patient socializes with neighbors in his apartment complex. Patient denies paranoia. Sleep has been stable with ambien. Appetite has been normal. Patient endorses fluctuating levels of energy and motivation, "I eventually get it done but I keep putting it off." Patient enjoys listening to music. Patient denies AH/VH, and SI/HI.         Review Of Systems:     Constitutional Negative   ENT Negative   Cardiovascular HTN, Hyperlipidemia   Respiratory Negative   Gastrointestinal Negative   Genitourinary Hx of prostate cancer- in remission since 2017   Musculoskeletal Arthritis    Integumentary Negative   Neurological R- hand tremor- PCP aware   Endocrine Negative     Past Medical History:   Patient Active Problem List   Diagnosis   • Posadas esophagus   • Constipation   • Degeneration, intervertebral disc, thoracic   • Erectile dysfunction of non-organic origin   • Esophagitis, reflux   • Mixed hyperlipidemia   • Lumbar radiculopathy   • 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[address])   • Osteopenia   • Hemangioma of skin and subcutaneous tissue   • Prostate cancer ([address])   • Increased frequency of urination   • Venous stasis dermatitis of left lower extremity   • Essential hypertension   • Herpes simplex   • 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HCC)   • Iron deficiency anemia due to chronic blood loss   • 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 A 10 year recall advised. • HERNIA REPAIR     • TONSILECTOMY AND ADNOIDECTOMY         Past Psychiatric History: JULIA Cortez and JULIA Mckeon at [facility] Psychiatric History: pt denies     Social History: single, no children, lives by self    Substance Abuse History: pt denies     Traumatic History: hx of alcohol abuse, now drinks wine every few months    The following portions of the patient's history were reviewed and updated as appropriate: past family history, past medical history, past social history, past surgical history and problem list.    Objective: There were no vitals filed for this visit.       Weight (last 2 days)     None          Mental status:  Appearance Casually dressed, thin   Mood Mildly anxious   Affect Mood congruent   Speech Soft, fluent, coherent    Thought Processes Linear and goal directed   Associations intact associations   Hallucinations Denies any auditory or visual hallucinations   Thought Content No passive or active suicidal or homicidal ideation, intent, or plan   Orientation Oriented to person, place, time, and situation   Recent and Remote Memory Grossly intact   Attention Span and Concentration Concentration intact   Intellect Appears to be of Average Intelligence   Insight Insight intact   Judgement judgment was intact   Muscle Strength Not tested, R hand tremor   Language Within normal limits   Fund of Knowledge Age appropriate   Pain NA     PHQ-A Depression Screening              Assessment/Plan:   1. Continue Ambien 10 mg hs prn  2. Continue Trazodone 150 mg hs prn  3. Continue Perphenazine 4 mg hs  4. Continue Imipramine 50 mg hs  5. Continue Prozac 20 mg am  6. Call if any adverse medication side effects  7. Follow up in 3 months     Diagnosis: Dysthymia, OCD, Delusional Sung in remission    Risks, Benefits And Possible Side Effects Of Medications:  Risks, benefits, and possible side effects of medications explained to patient and family, they verbalize understanding    Controlled Medication Discussion: Discussed with patient Black Box warning on concurrent use of benzodiazepines and opioid medications including sedation, respiratory depression, coma and death. Patient understands the risk of treatment with benzodiazepines in addition to opioids and wants to continue taking those medications. Psychotherapy Provided: Supportive psychotherapy provided. Yes  Medication education provided to Geno Curry.       Visit Time    Visit Start Time: 10:30 AM  Visit Stop Time: 11:00 AM  Total Visit Duration: 30 minutes

## 2023-08-17 ENCOUNTER — RA CDI HCC (OUTPATIENT)
Dept: OTHER | Facility: HOSPITAL | Age: 78
End: 2023-08-17

## 2023-08-17 NOTE — PROGRESS NOTES
[address] [facility] coding opportunities       Chart reviewed, no opportunity found: CHART REVIEWED, NO OPPORTUNITY FOUND        Patients Insurance     Medicare Insurance: Medicare

## 2023-08-23 ENCOUNTER — OFFICE VISIT (OUTPATIENT)
Dept: FAMILY MEDICINE CLINIC | Facility: HOSPITAL | Age: 78
End: 2023-08-23
Payer: MEDICARE

## 2023-08-23 ENCOUNTER — APPOINTMENT (OUTPATIENT)
Dept: LAB | Facility: HOSPITAL | Age: 78
End: 2023-08-23
Payer: MEDICARE

## 2023-08-23 VITALS
DIASTOLIC BLOOD PRESSURE: 86 MMHG | SYSTOLIC BLOOD PRESSURE: 146 MMHG | TEMPERATURE: 97 F | OXYGEN SATURATION: 98 % | WEIGHT: 181.4 LBS | BODY MASS INDEX: 25.4 KG/M2 | HEART RATE: 70 BPM | HEIGHT: 71 IN

## 2023-08-23 DIAGNOSIS — C61 PROSTATE CANCER (HCC): ICD-10-CM

## 2023-08-23 DIAGNOSIS — N18.31 STAGE 3A CHRONIC KIDNEY DISEASE (HCC): ICD-10-CM

## 2023-08-23 DIAGNOSIS — L97.329 STASIS ULCER OF ANKLE, LEFT (HCC): ICD-10-CM

## 2023-08-23 DIAGNOSIS — D50.0 IRON DEFICIENCY ANEMIA DUE TO CHRONIC BLOOD LOSS: Primary | ICD-10-CM

## 2023-08-23 DIAGNOSIS — I10 ESSENTIAL HYPERTENSION: ICD-10-CM

## 2023-08-23 DIAGNOSIS — I83.023 STASIS ULCER OF ANKLE, LEFT (HCC): ICD-10-CM

## 2023-08-23 DIAGNOSIS — D50.0 IRON DEFICIENCY ANEMIA DUE TO CHRONIC BLOOD LOSS: ICD-10-CM

## 2023-08-23 DIAGNOSIS — K22.70 BARRETT'S ESOPHAGUS WITHOUT DYSPLASIA: ICD-10-CM

## 2023-08-23 LAB
ALBUMIN SERPL BCP-MCNC: 4.2 G/DL (ref 3.5–5)
ALP SERPL-CCNC: 111 U/L (ref 34–104)
ALT SERPL W P-5'-P-CCNC: 6 U/L (ref 7–52)
ANION GAP SERPL CALCULATED.3IONS-SCNC: 9 MMOL/L
AST SERPL W P-5'-P-CCNC: 14 U/L (ref 13–39)
BASOPHILS # BLD AUTO: 0.05 THOUSANDS/ÂΜL (ref 0–0.1)
BASOPHILS NFR BLD AUTO: 1 % (ref 0–1)
BILIRUB SERPL-MCNC: 0.28 MG/DL (ref 0.2–1)
BUN SERPL-MCNC: 19 MG/DL (ref 5–25)
CALCIUM SERPL-MCNC: 8.8 MG/DL (ref 8.4–10.2)
CHLORIDE SERPL-SCNC: 104 MMOL/L (ref 96–108)
CO2 SERPL-SCNC: 26 MMOL/L (ref 21–32)
CREAT SERPL-MCNC: 1.38 MG/DL (ref 0.6–1.3)
EOSINOPHIL # BLD AUTO: 0.53 THOUSAND/ÂΜL (ref 0–0.61)
EOSINOPHIL NFR BLD AUTO: 6 % (ref 0–6)
ERYTHROCYTE [DISTWIDTH] IN BLOOD BY AUTOMATED COUNT: 15.6 % (ref 11.6–15.1)
FERRITIN SERPL-MCNC: 4 NG/ML (ref 24–336)
GFR SERPL CREATININE-BSD FRML MDRD: 49 ML/MIN/1.73SQ M
GLUCOSE SERPL-MCNC: 105 MG/DL (ref 65–140)
HCT VFR BLD AUTO: 33.1 % (ref 36.5–49.3)
HGB BLD-MCNC: 9.7 G/DL (ref 12–17)
IMM GRANULOCYTES # BLD AUTO: 0.04 THOUSAND/UL (ref 0–0.2)
IMM GRANULOCYTES NFR BLD AUTO: 0 % (ref 0–2)
IRON SATN MFR SERPL: 3 % (ref 15–50)
IRON SERPL-MCNC: 22 UG/DL (ref 50–212)
LYMPHOCYTES # BLD AUTO: 1.42 THOUSANDS/ÂΜL (ref 0.6–4.47)
LYMPHOCYTES NFR BLD AUTO: 15 % (ref 14–44)
MCH RBC QN AUTO: 22.2 PG (ref 26.8–34.3)
MCHC RBC AUTO-ENTMCNC: 29.3 G/DL (ref 31.4–37.4)
MCV RBC AUTO: 76 FL (ref 82–98)
MONOCYTES # BLD AUTO: 0.79 THOUSAND/ÂΜL (ref 0.17–1.22)
MONOCYTES NFR BLD AUTO: 8 % (ref 4–12)
NEUTROPHILS # BLD AUTO: 6.52 THOUSANDS/ÂΜL (ref 1.85–7.62)
NEUTS SEG NFR BLD AUTO: 70 % (ref 43–75)
NRBC BLD AUTO-RTO: 0 /100 WBCS
PLATELET # BLD AUTO: 232 THOUSANDS/UL (ref 149–390)
PMV BLD AUTO: 11.6 FL (ref 8.9–12.7)
POTASSIUM SERPL-SCNC: 3.9 MMOL/L (ref 3.5–5.3)
PROT SERPL-MCNC: 7.6 G/DL (ref 6.4–8.4)
RBC # BLD AUTO: 4.36 MILLION/UL (ref 3.88–5.62)
SODIUM SERPL-SCNC: 139 MMOL/L (ref 135–147)
TIBC SERPL-MCNC: 672 UG/DL (ref 250–450)
UIBC SERPL-MCNC: 650 UG/DL (ref 155–355)
WBC # BLD AUTO: 9.35 THOUSAND/UL (ref 4.31–10.16)

## 2023-08-23 PROCEDURE — 83550 IRON BINDING TEST: CPT

## 2023-08-23 PROCEDURE — 80053 COMPREHEN METABOLIC PANEL: CPT

## 2023-08-23 PROCEDURE — 85025 COMPLETE CBC W/AUTO DIFF WBC: CPT

## 2023-08-23 PROCEDURE — 82728 ASSAY OF FERRITIN: CPT

## 2023-08-23 PROCEDURE — 36415 COLL VENOUS BLD VENIPUNCTURE: CPT

## 2023-08-23 PROCEDURE — 83540 ASSAY OF IRON: CPT

## 2023-08-23 PROCEDURE — 99214 OFFICE O/P EST MOD 30 MIN: CPT | Performed by: FAMILY MEDICINE

## 2023-08-23 NOTE — PROGRESS NOTES
Name: Tomy Noel      : 1945      MRN: [id number]  Encounter Provider: Luz Maria Thomas MD  Encounter Date: 2023   Encounter department: [address]     1. Iron deficiency anemia due to chronic blood loss  -     CBC and differential; Future  -     Iron Panel (Includes Ferritin, Iron Sat%, Iron, and TIBC); Future    2. Stage 3a chronic kidney disease (HCC)  -     Comprehensive metabolic panel; Future    3. Stasis ulcer of ankle, left ([address])  Assessment & Plan:  Ulcer resolved      4. Prostate cancer [facility])  Assessment & Plan:  ELIOT      5. Essential hypertension  Assessment & Plan:  Adequate BP control      6. Posadas's esophagus without dysplasia         Subjective     2 month follow up. Had EGD and colonoscopy with Dr Reuben Jessica for anemia/Barretts  Has hiatal henia    No polyps on scoping    Lost some weight. Parkinsons doing better, in L arm    Review of Systems    Past Medical History:   Diagnosis Date   • Anxiety    • Cancer [facility])    • Depression    • ED (erectile dysfunction)    • GERD (gastroesophageal reflux disease)    • OCD (obsessive compulsive disorder)    • Parkinson disease ([address])      Past Surgical History:   Procedure Laterality Date   • COLONOSCOPY  10/12/2017     unremarkable. A 10 year recall advised.    • HERNIA REPAIR     • TONSILECTOMY AND ADNOIDECTOMY       Family History   Problem Relation Age of Onset   • Depression Father    • Hypertension Mother    • Colon cancer Neg Hx    • Colon polyps Neg Hx      Social History     Socioeconomic History   • Marital status: Single     Spouse name: None   • Number of children: None   • Years of education: None   • Highest education level: None   Occupational History   • None   Tobacco Use   • Smoking status: Former   • Smokeless tobacco: Never   Vaping Use   • Vaping Use: Never used   Substance and Sexual Activity   • Alcohol use: Yes     Comment: Rarely   • Drug use: No   • Sexual activity: None   Other Topics Concern   • None   Social History Narrative    Always uses seat belt - Denied     Social Determinants of Health     Financial Resource Strain: Low Risk  (6/9/2023)    Overall Financial Resource Strain (CARDIA)    • Difficulty of Paying Living Expenses: Not very hard   Food Insecurity: Not on file   Transportation Needs: No Transportation Needs (6/9/2023)    PRAPARE - Transportation    • Lack of Transportation (Medical): No    • Lack of Transportation (Non-Medical): No   Physical Activity: Not on file   Stress: Not on file   Social Connections: Not on file   Intimate Partner Violence: Not on file   Housing Stability: Not on file     Current Outpatient Medications on File Prior to Visit   Medication Sig   • amLODIPine (NORVASC) 5 mg tablet TAKE 1 TABLET BY MOUTH TWICE A DAY   • carbidopa-levodopa (SINEMET CR)  mg TBCR per ER tablet TAKE 1 TABLET BY MOUTH TWICE A DAY   • FLUoxetine (PROzac) 20 mg capsule Take 1 capsule (20 mg total) by mouth daily   • imipramine (TOFRANIL) 50 mg tablet Take 1 tablet (50 mg total) by mouth daily at bedtime   • linaCLOtide (Linzess) 290 MCG CAPS Take 1 capsule by mouth daily   • metoprolol succinate (TOPROL-XL) 50 mg 24 hr tablet Take 1 tablet (50 mg total) by mouth daily   • omeprazole (PriLOSEC) 40 MG capsule TAKE 1 CAPSULE (40 MG TOTAL) BY MOUTH DAILY.    • perphenazine 4 mg tablet Take 1 tablet (4 mg total) by mouth daily at bedtime   • rosuvastatin (CRESTOR) 10 MG tablet Take 1 tablet (10 mg total) by mouth daily   • traZODone (DESYREL) 150 mg tablet Take 1 tablet (150 mg total) by mouth daily at bedtime as needed for sleep   • zolpidem (AMBIEN) 10 mg tablet Take one tablet at bedtime as needed for sleep     No Known Allergies  Immunization History   Administered Date(s) Administered   • COVID-19 PFIZER VACCINE 0.3 ML IM 05/27/2021, 06/17/2021, 05/11/2022   • INFLUENZA 12/04/2017, 12/06/2022   • Influenza Split High Dose Preservative Free IM 12/02/2013, 12/01/2014, 11/30/2015, 09/07/2016, 12/04/2017   • Influenza, high dose seasonal 0.7 mL 11/02/2018, 12/03/2019, 12/01/2020, 12/07/2021, 12/06/2022   • Pneumococcal Conjugate 13-Valent 11/30/2015   • Pneumococcal Polysaccharide PPV23 06/04/2014   • Zoster 05/29/2013       Objective     /86 (BP Location: Left arm, Patient Position: Sitting, Cuff Size: Standard)   Pulse 70   Temp (!) 97 °F (36.1 °C) (Tympanic)   Ht 5' 11" (1.803 m)   Wt 82.3 kg (181 lb 6.4 oz)   SpO2 98%   BMI 25.30 kg/m²     Physical Exam  Kieran Hopper MD

## 2023-08-31 ENCOUNTER — OFFICE VISIT (OUTPATIENT)
Dept: GASTROENTEROLOGY | Facility: CLINIC | Age: 78
End: 2023-08-31
Payer: MEDICARE

## 2023-08-31 VITALS
WEIGHT: 178 LBS | SYSTOLIC BLOOD PRESSURE: 135 MMHG | DIASTOLIC BLOOD PRESSURE: 63 MMHG | BODY MASS INDEX: 24.92 KG/M2 | HEIGHT: 71 IN

## 2023-08-31 DIAGNOSIS — K59.00 CONSTIPATION, UNSPECIFIED CONSTIPATION TYPE: ICD-10-CM

## 2023-08-31 DIAGNOSIS — K21.9 GASTROESOPHAGEAL REFLUX DISEASE, UNSPECIFIED WHETHER ESOPHAGITIS PRESENT: Primary | ICD-10-CM

## 2023-08-31 DIAGNOSIS — D50.0 IRON DEFICIENCY ANEMIA DUE TO CHRONIC BLOOD LOSS: ICD-10-CM

## 2023-08-31 DIAGNOSIS — K22.70 BARRETT'S ESOPHAGUS WITHOUT DYSPLASIA: ICD-10-CM

## 2023-08-31 PROCEDURE — 99214 OFFICE O/P EST MOD 30 MIN: CPT | Performed by: INTERNAL MEDICINE

## 2023-08-31 RX ORDER — FERROUS SULFATE TAB EC 324 MG (65 MG FE EQUIVALENT) 324 (65 FE) MG
324 TABLET DELAYED RESPONSE ORAL
Qty: 45 TABLET | Refills: 0 | Status: SHIPPED | OUTPATIENT
Start: 2023-08-31 | End: 2023-11-29

## 2023-08-31 NOTE — PROGRESS NOTES
[facility] - Outpatient Follow-up Note  Hamlet Lau 69 y.o. male MRN: [id number]  Encounter: [id number]    ASSESSMENT AND PLAN:      1. Iron deficiency anemia due to chronic blood loss  Patient with new onset iron deficiency anemia. Most recent blood work with hemoglobin 9.7 MCV of 76 iron saturation of 3%. No overt signs of GI bleeding. EGD and colonoscopy with erosive esophagitis, Posadas's esophagus, subcentimeter tubular adenoma. We will plan to start on ferrous sulfate 324 mg every other day. Plan for repeat CBC and iron panel in 1 month    Plan for capsule endoscopy to further evaluate for other causes of GI blood loss. - Ambulatory Referral to Gastroenterology  - ferrous sulfate 324 (65 Fe) mg; Take 1 tablet (324 mg total) by mouth every other day with breakfast  Dispense: 45 tablet; Refill: 0  - Capsule endoscopy; Future  - CBC and differential; Future  - Iron, TIBC and Ferritin Panel; Future    2. Posadas's esophagus without dysplasia  Patient with history of esophagitis. We will need to plan for repeat EGD and evaluation of esophagitis and Posadas's esophagus after healing on omeprazole 40 mg once daily. Patient plans to get capsule endoscopy first.  Can try to evaluate GEJ with pill camera but will likely need direct visualization with EGD.  - Ambulatory Referral to Gastroenterology    3. Gastroesophageal reflux disease, unspecified whether esophagitis present  Counseled diet and exercise. Continue omeprazole 40 mg once daily at this time. 4. Constipation, unspecified constipation type  Controlled on Linzess to 90. Continue at this time.       Follow up appointment: Based on pill camera test.    I have spent 31 minutes which was spent on one or more of the following: obtaining and reviewing separately obtained history, performing a medically appropriate examination and evaluation, counseling and educating the patient, ordering medications, tests, and procedures, documenting clinical information in the electronic or other health record, and care coordination. ______________________________________________________________________    Chief Complaint   Patient presents with   • f/u procedures     HPI:   Patient is a 52-year-old male past medical history of iron deficiency anemia. Last had a office visit in June 2023. Evaluation for new onset iron deficiency anemia prompted a EGD and colonoscopy in July 2023. EGD with Posadas's esophagus and erosive esophagitis. Colonoscopy with subcentimeter polyp that was a tubular adenoma. No recall due to age. Repeat iron panel in August 2023 with iron saturation of 3% and hemoglobin of 9.7 MCV of 76. Patient currently not on iron supplementation. Currently denies any upper GI complaints of nausea vomiting heartburn dysphagia. Denies any abdominal pain or change of bowel habits. Currently his constipation controlled on his Linzess. No GI bleeding noted. Historical Information   Past Medical History:   Diagnosis Date   • Anxiety    • Cancer [facility])    • Depression    • ED (erectile dysfunction)    • GERD (gastroesophageal reflux disease)    • OCD (obsessive compulsive disorder)    • Parkinson disease ([address])      Past Surgical History:   Procedure Laterality Date   • COLONOSCOPY  10/12/2017     unremarkable. A 10 year recall advised.    • HERNIA REPAIR     • TONSILECTOMY AND ADNOIDECTOMY       Social History     Substance and Sexual Activity   Alcohol Use Yes    Comment: Rarely     Social History     Substance and Sexual Activity   Drug Use No     Social History     Tobacco Use   Smoking Status Former   Smokeless Tobacco Never     Family History   Problem Relation Age of Onset   • Depression Father    • Hypertension Mother    • Colon cancer Neg Hx    • Colon polyps Neg Hx          Current Outpatient Medications:   •  amLODIPine (NORVASC) 5 mg tablet  •  carbidopa-levodopa (SINEMET CR)  mg TBCR per ER tablet  •  ferrous sulfate 324 (65 Fe) mg  •  FLUoxetine (PROzac) 20 mg capsule  •  imipramine (TOFRANIL) 50 mg tablet  •  linaCLOtide (Linzess) 290 MCG CAPS  •  metoprolol succinate (TOPROL-XL) 50 mg 24 hr tablet  •  omeprazole (PriLOSEC) 40 MG capsule  •  perphenazine 4 mg tablet  •  rosuvastatin (CRESTOR) 10 MG tablet  •  traZODone (DESYREL) 150 mg tablet  •  zolpidem (AMBIEN) 10 mg tablet  No Known Allergies  Reviewed medications and allergies and updated as indicated    PHYSICAL EXAM:    Blood pressure 135/63, height 5' 11" (1.803 m), weight 80.7 kg (178 lb). Body mass index is 24.83 kg/m². General Appearance: NAD, cooperative, alert  Eyes: Anicteric  GI:  Soft, non-tender, non-distended; normal bowel sounds; no masses, no organomegaly   Rectal: Deferred  Musculoskeletal: No edema.   Skin:  No jaundice    Lab Results:   Lab Results   Component Value Date    WBC 9.35 08/23/2023    WBC 9.15 04/24/2023    WBC 8.21 11/21/2022    HGB 9.7 (L) 08/23/2023    HGB 10.0 (L) 04/24/2023    HGB 10.9 (L) 11/21/2022    MCV 76 (L) 08/23/2023     08/23/2023     04/24/2023     11/21/2022     Lab Results   Component Value Date     11/24/2015    K 3.9 08/23/2023     08/23/2023    CO2 26 08/23/2023    ANIONGAP 1 (L) 11/24/2015    BUN 19 08/23/2023    CREATININE 1.38 (H) 08/23/2023    GLUCOSE 81 11/24/2015    GLUF 94 04/24/2023    CALCIUM 8.8 08/23/2023    CORRECTEDCA 9.4 11/21/2022    AST 14 08/23/2023    AST 18 04/24/2023    AST 15 11/21/2022    ALT 6 (L) 08/23/2023    ALT 28 04/24/2023    ALT 22 11/21/2022    ALKPHOS 111 (H) 08/23/2023    ALKPHOS 102 04/24/2023    ALKPHOS 113 11/21/2022    PROT 7.2 11/24/2015    BILITOT 0.67 11/24/2015    BILITOT 0.5 11/25/2014    BILITOT 0.5 11/26/2013    EGFR 49 08/23/2023     Lab Results   Component Value Date    IRON 22 (L) 08/23/2023    TIBC 672 (H) 08/23/2023    FERRITIN 4 (L) 08/23/2023     No results found for: "LIPASE"    Radiology Results:   No results found.

## 2023-09-07 ENCOUNTER — PROCEDURE VISIT (OUTPATIENT)
Dept: GASTROENTEROLOGY | Facility: CLINIC | Age: 78
End: 2023-09-07
Payer: MEDICARE

## 2023-09-07 DIAGNOSIS — D50.0 IRON DEFICIENCY ANEMIA DUE TO CHRONIC BLOOD LOSS: Primary | ICD-10-CM

## 2023-09-07 PROCEDURE — 91110 GI TRC IMG INTRAL ESOPH-ILE: CPT | Performed by: INTERNAL MEDICINE

## 2023-09-07 NOTE — PROGRESS NOTES
Patient here for capsule endoscopy. Patient tolerated bowel prep. Remained NPO after midnight. Reviewed capsule and instructions. Patient verbalized understanding. Patient to return at 4:15.

## 2023-09-25 ENCOUNTER — TELEPHONE (OUTPATIENT)
Dept: GASTROENTEROLOGY | Facility: CLINIC | Age: 78
End: 2023-09-25

## 2023-09-25 NOTE — TELEPHONE ENCOUNTER
Reviewed capsule endoscopy. No evidence of bleeding in small bowel. Discussed with patient. Recommendation: Continue omeprazole 40 mg daily. Continue iron every other day. Recheck CBC and iron panel middle of October. Follow-up office visit 2 months.   Will need follow-up EGD

## 2023-09-26 DIAGNOSIS — F42.2 MIXED OBSESSIONAL THOUGHTS AND ACTS: ICD-10-CM

## 2023-09-26 DIAGNOSIS — K58.1 IRRITABLE BOWEL SYNDROME WITH CONSTIPATION: ICD-10-CM

## 2023-09-26 DIAGNOSIS — D50.0 IRON DEFICIENCY ANEMIA DUE TO CHRONIC BLOOD LOSS: ICD-10-CM

## 2023-09-26 DIAGNOSIS — K21.00 GASTROESOPHAGEAL REFLUX DISEASE WITH ESOPHAGITIS WITHOUT HEMORRHAGE: ICD-10-CM

## 2023-09-26 RX ORDER — OMEPRAZOLE 40 MG/1
40 CAPSULE, DELAYED RELEASE ORAL DAILY
Qty: 90 CAPSULE | Refills: 0 | Status: SHIPPED | OUTPATIENT
Start: 2023-09-26

## 2023-09-26 RX ORDER — LINACLOTIDE 290 UG/1
CAPSULE, GELATIN COATED ORAL DAILY
Qty: 30 CAPSULE | Refills: 3 | Status: SHIPPED | OUTPATIENT
Start: 2023-09-26

## 2023-09-26 RX ORDER — FERROUS SULFATE TAB EC 324 MG (65 MG FE EQUIVALENT) 324 (65 FE) MG
324 TABLET DELAYED RESPONSE ORAL
Qty: 45 TABLET | Refills: 0 | Status: SHIPPED | OUTPATIENT
Start: 2023-09-26 | End: 2023-12-25

## 2023-09-28 RX ORDER — IMIPRAMINE HCL 50 MG
50 TABLET ORAL
Qty: 90 TABLET | Refills: 0 | Status: SHIPPED | OUTPATIENT
Start: 2023-09-28

## 2023-10-04 DIAGNOSIS — F42.2 MIXED OBSESSIONAL THOUGHTS AND ACTS: ICD-10-CM

## 2023-10-10 RX ORDER — FLUOXETINE HYDROCHLORIDE 20 MG/1
20 CAPSULE ORAL DAILY
Qty: 90 CAPSULE | Refills: 0 | Status: SHIPPED | OUTPATIENT
Start: 2023-10-10

## 2023-11-03 DIAGNOSIS — I49.1 PAC (PREMATURE ATRIAL CONTRACTION): ICD-10-CM

## 2023-11-03 DIAGNOSIS — E78.2 MIXED HYPERLIPIDEMIA: ICD-10-CM

## 2023-11-03 RX ORDER — ROSUVASTATIN CALCIUM 10 MG/1
10 TABLET, COATED ORAL DAILY
Qty: 90 TABLET | Refills: 3 | Status: SHIPPED | OUTPATIENT
Start: 2023-11-03

## 2023-11-03 RX ORDER — METOPROLOL SUCCINATE 50 MG/1
50 TABLET, EXTENDED RELEASE ORAL DAILY
Qty: 90 TABLET | Refills: 3 | Status: SHIPPED | OUTPATIENT
Start: 2023-11-03

## 2023-11-17 ENCOUNTER — OFFICE VISIT (OUTPATIENT)
Dept: PSYCHIATRY | Facility: CLINIC | Age: 78
End: 2023-11-17
Payer: MEDICARE

## 2023-11-17 DIAGNOSIS — F34.1 DYSTHYMIC DISORDER: ICD-10-CM

## 2023-11-17 DIAGNOSIS — F42.2 MIXED OBSESSIONAL THOUGHTS AND ACTS: ICD-10-CM

## 2023-11-17 PROCEDURE — 99213 OFFICE O/P EST LOW 20 MIN: CPT

## 2023-11-17 RX ORDER — FLUOXETINE HYDROCHLORIDE 20 MG/1
20 CAPSULE ORAL DAILY
Qty: 90 CAPSULE | Refills: 0 | Status: SHIPPED | OUTPATIENT
Start: 2023-11-17

## 2023-11-17 RX ORDER — ZOLPIDEM TARTRATE 10 MG/1
TABLET ORAL
Qty: 30 TABLET | Refills: 2 | Status: SHIPPED | OUTPATIENT
Start: 2023-11-17

## 2023-11-17 RX ORDER — PERPHENAZINE 4 MG/1
4 TABLET ORAL
Qty: 90 TABLET | Refills: 0 | Status: SHIPPED | OUTPATIENT
Start: 2023-11-17

## 2023-11-17 RX ORDER — IMIPRAMINE HCL 50 MG
50 TABLET ORAL
Qty: 90 TABLET | Refills: 0 | Status: SHIPPED | OUTPATIENT
Start: 2023-11-17

## 2023-11-17 RX ORDER — TRAZODONE HYDROCHLORIDE 150 MG/1
150 TABLET ORAL
Qty: 90 TABLET | Refills: 0 | Status: SHIPPED | OUTPATIENT
Start: 2023-11-17

## 2023-11-17 NOTE — PSYCH
Psychiatric Medication Management - [address] 69 y.o. male MRN: [id number]        This note was not shared with the patient due to reasonable likelihood of causing patient harm    Reason for Visit: medication management    Subjective:   Patient is being treated for OCD and dysthymia. Patient was prescribed Sinemet on 6/9/23 due to L sided tremors, which has been affecting his writing. Patient recently had an endoscopy and colonoscopy done on July 20th and was diagnosed with a hiatal hernia, Christian's esophagus, and GERD. Patient is observed on Ambien 10 mg hs prn, Trazodone 150 mg hs prn, Perphenazine 4 mg hs, Imipramine 50 mg hs, Prozac 20 mg am. OCD symptoms since 16 yo. Patient tolerates the medications well, has been compliant and denies any side effects. Patient reports he has been "doing okay" with stable mood. Patient rates both his depression and anxiety 5/10. OCD symptoms remain manageable but does admit to washing his hands every 2 hours. In addition, patient reports less frequent obsessive thoughts. Patient enjoys listening to music and socializing with neighbors. Sleep has been stable with ambien. Appetite is adequate. Good energy and motivation. Patient denies panic attacks, paranoia, ah/vh, elevated moods and si/hi.       Review Of Systems:     Constitutional Negative   ENT Negative   Cardiovascular HTN, Hyperlipidemia   Respiratory Negative   Gastrointestinal Negative   Genitourinary Hx of prostate cancer- in remission since 2017   Musculoskeletal Arthritis    Integumentary Negative   Neurological R- hand tremor- PCP aware   Endocrine Negative     Past Medical History:   Patient Active Problem List   Diagnosis    Posadas esophagus    Constipation    Degeneration, intervertebral disc, thoracic    Erectile dysfunction of non-organic origin    Esophagitis, reflux    Mixed hyperlipidemia    Lumbar radiculopathy    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HCC)    Osteopenia Hemangioma of skin and subcutaneous tissue    Prostate cancer (HCC)    Increased frequency of urination    Venous stasis dermatitis of left lower extremity    Essential hypertension    Herpes simplex    SAPHO syndrome     Iron deficiency anemia due to chronic blood loss    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 A 10 year recall advised. HERNIA REPAIR      TONSILECTOMY AND ADNOIDECTOMY         Past Psychiatric History: JULIA Carrillo and JULIA Brooks at [facility] Psychiatric History: pt denies     Social History: single, no children, lives by self    Substance Abuse History: pt denies     Traumatic History: hx of alcohol abuse, now drinks wine every few months    The following portions of the patient's history were reviewed and updated as appropriate: past family history, past medical history, past social history, past surgical history and problem list.    Objective: There were no vitals filed for this visit.       Weight (last 2 days)       None            Mental status:  Appearance Casually dressed, thin   Mood Euthymic   Affect Mood congruent   Speech Soft, fluent, coherent    Thought Processes Linear and goal directed   Associations intact associations   Hallucinations Denies any auditory or visual hallucinations   Thought Content No passive or active suicidal or homicidal ideation, intent, or plan   Orientation Oriented to person, place, time, and situation   Recent and Remote Memory Grossly intact   Attention Span and Concentration Concentration intact   Intellect Appears to be of Average Intelligence   Insight Insight intact   Judgement judgment was intact   Muscle Strength Not tested, R hand tremor   Language Within normal limits   Fund of Knowledge Age appropriate   Pain NA     PHQ-A Depression Screening                Assessment/Plan:   1. Continue Ambien 10 mg hs prn  2. Continue Trazodone 150 mg hs prn  3. Continue Perphenazine 4 mg hs  4. Continue Imipramine 50 mg hs  5. Continue Prozac 20 mg am  6. Call if any adverse medication side effects  7. Follow up in 3 months     Diagnosis: Dysthymia, OCD, Delusional Sung in remission    Risks, Benefits And Possible Side Effects Of Medications:  Risks, benefits, and possible side effects of medications explained to patient and family, they verbalize understanding    Controlled Medication Discussion: Discussed with patient Black Box warning on concurrent use of benzodiazepines and opioid medications including sedation, respiratory depression, coma and death. Patient understands the risk of treatment with benzodiazepines in addition to opioids and wants to continue taking those medications. Psychotherapy Provided: Supportive psychotherapy provided. Yes  Medication education provided to Ellered Little.       Visit Time    Visit Start Time: 1:00 PM  Visit Stop Time: 1:20 PM  Total Visit Duration:  20 minutes

## 2023-12-02 DIAGNOSIS — I10 ESSENTIAL HYPERTENSION: ICD-10-CM

## 2023-12-02 RX ORDER — AMLODIPINE BESYLATE 5 MG/1
TABLET ORAL
Qty: 180 TABLET | Refills: 2 | Status: SHIPPED | OUTPATIENT
Start: 2023-12-02

## 2023-12-04 ENCOUNTER — RA CDI HCC (OUTPATIENT)
Dept: OTHER | Facility: HOSPITAL | Age: 78
End: 2023-12-04

## 2023-12-07 ENCOUNTER — APPOINTMENT (OUTPATIENT)
Dept: LAB | Facility: HOSPITAL | Age: 78
End: 2023-12-07
Payer: MEDICARE

## 2023-12-07 DIAGNOSIS — D50.0 IRON DEFICIENCY ANEMIA DUE TO CHRONIC BLOOD LOSS: ICD-10-CM

## 2023-12-07 LAB
BASOPHILS # BLD AUTO: 0.06 THOUSANDS/ÂΜL (ref 0–0.1)
BASOPHILS NFR BLD AUTO: 1 % (ref 0–1)
EOSINOPHIL # BLD AUTO: 0.44 THOUSAND/ÂΜL (ref 0–0.61)
EOSINOPHIL NFR BLD AUTO: 5 % (ref 0–6)
ERYTHROCYTE [DISTWIDTH] IN BLOOD BY AUTOMATED COUNT: 15.9 % (ref 11.6–15.1)
FERRITIN SERPL-MCNC: 6 NG/ML (ref 24–336)
HCT VFR BLD AUTO: 39.9 % (ref 36.5–49.3)
HGB BLD-MCNC: 11.8 G/DL (ref 12–17)
IMM GRANULOCYTES # BLD AUTO: 0.03 THOUSAND/UL (ref 0–0.2)
IMM GRANULOCYTES NFR BLD AUTO: 0 % (ref 0–2)
IRON SATN MFR SERPL: 10 % (ref 15–50)
IRON SERPL-MCNC: 49 UG/DL (ref 50–212)
LYMPHOCYTES # BLD AUTO: 1.4 THOUSANDS/ÂΜL (ref 0.6–4.47)
LYMPHOCYTES NFR BLD AUTO: 16 % (ref 14–44)
MCH RBC QN AUTO: 24.4 PG (ref 26.8–34.3)
MCHC RBC AUTO-ENTMCNC: 29.6 G/DL (ref 31.4–37.4)
MCV RBC AUTO: 82 FL (ref 82–98)
MONOCYTES # BLD AUTO: 0.61 THOUSAND/ÂΜL (ref 0.17–1.22)
MONOCYTES NFR BLD AUTO: 7 % (ref 4–12)
NEUTROPHILS # BLD AUTO: 6.47 THOUSANDS/ÂΜL (ref 1.85–7.62)
NEUTS SEG NFR BLD AUTO: 71 % (ref 43–75)
NRBC BLD AUTO-RTO: 0 /100 WBCS
PLATELET # BLD AUTO: 259 THOUSANDS/UL (ref 149–390)
PMV BLD AUTO: 11.5 FL (ref 8.9–12.7)
RBC # BLD AUTO: 4.84 MILLION/UL (ref 3.88–5.62)
TIBC SERPL-MCNC: 477 UG/DL (ref 250–450)
UIBC SERPL-MCNC: 428 UG/DL (ref 155–355)
WBC # BLD AUTO: 9.01 THOUSAND/UL (ref 4.31–10.16)

## 2023-12-07 PROCEDURE — 85025 COMPLETE CBC W/AUTO DIFF WBC: CPT

## 2023-12-07 PROCEDURE — 83550 IRON BINDING TEST: CPT

## 2023-12-07 PROCEDURE — 36415 COLL VENOUS BLD VENIPUNCTURE: CPT

## 2023-12-07 PROCEDURE — 82728 ASSAY OF FERRITIN: CPT

## 2023-12-07 PROCEDURE — 83540 ASSAY OF IRON: CPT

## 2023-12-08 ENCOUNTER — OFFICE VISIT (OUTPATIENT)
Dept: FAMILY MEDICINE CLINIC | Facility: HOSPITAL | Age: 78
End: 2023-12-08
Payer: MEDICARE

## 2023-12-08 ENCOUNTER — TELEPHONE (OUTPATIENT)
Dept: GASTROENTEROLOGY | Facility: CLINIC | Age: 78
End: 2023-12-08

## 2023-12-08 VITALS
HEIGHT: 71 IN | BODY MASS INDEX: 24.67 KG/M2 | HEART RATE: 75 BPM | OXYGEN SATURATION: 97 % | SYSTOLIC BLOOD PRESSURE: 138 MMHG | DIASTOLIC BLOOD PRESSURE: 80 MMHG | TEMPERATURE: 97.7 F | WEIGHT: 176.2 LBS

## 2023-12-08 DIAGNOSIS — E78.2 MIXED HYPERLIPIDEMIA: ICD-10-CM

## 2023-12-08 DIAGNOSIS — C61 PROSTATE CANCER (HCC): ICD-10-CM

## 2023-12-08 DIAGNOSIS — F60.5 OBSESSIVE COMPULSIVE PERSONALITY DISORDER (HCC): ICD-10-CM

## 2023-12-08 DIAGNOSIS — G21.19 PARKINSONISM DUE TO DRUGS (HCC): ICD-10-CM

## 2023-12-08 DIAGNOSIS — R79.9 ABNORMAL BLOOD CHEMISTRY: ICD-10-CM

## 2023-12-08 DIAGNOSIS — I10 ESSENTIAL HYPERTENSION: ICD-10-CM

## 2023-12-08 DIAGNOSIS — Z23 ENCOUNTER FOR IMMUNIZATION: ICD-10-CM

## 2023-12-08 DIAGNOSIS — K22.70 BARRETT'S ESOPHAGUS WITHOUT DYSPLASIA: Primary | ICD-10-CM

## 2023-12-08 DIAGNOSIS — D50.0 IRON DEFICIENCY ANEMIA DUE TO CHRONIC BLOOD LOSS: ICD-10-CM

## 2023-12-08 PROCEDURE — 99214 OFFICE O/P EST MOD 30 MIN: CPT | Performed by: FAMILY MEDICINE

## 2023-12-08 PROCEDURE — 90662 IIV NO PRSV INCREASED AG IM: CPT

## 2023-12-08 PROCEDURE — G0008 ADMIN INFLUENZA VIRUS VAC: HCPCS

## 2023-12-08 RX ORDER — FERROUS SULFATE 324(65)MG
324 TABLET, DELAYED RELEASE (ENTERIC COATED) ORAL
Qty: 45 TABLET | Refills: 0 | Status: SHIPPED | OUTPATIENT
Start: 2023-12-08 | End: 2023-12-15

## 2023-12-08 NOTE — TELEPHONE ENCOUNTER
----- Message from Mani Chris MD sent at 12/8/2023  8:28 AM EST -----  Ferritin mildly improved but still very low at 6. Iron saturation of 10 which is improved from 3. Hemoglobin of 11.8 which is improved from 9.7. Hello can we reach out to this patient regarding his blood test.  His hemoglobin level and his blood levels are improving. Also his iron stores are improving. They are still low so he can continue the iron supplementation. Would encourage him to make a follow-up appointment with us hopefully in the next month or 2 for discussions about a repeat endoscopy.

## 2023-12-08 NOTE — TELEPHONE ENCOUNTER
Pt was returning call. Please reach back out to pt regarding lab results.  Pt did say he was at the pcp today and that his pcp was pleased so he is not worried about waiting the weekend for a call back

## 2023-12-08 NOTE — PROGRESS NOTES
Name: Leelee Mehta      : 1945      MRN: [id number]  Encounter Provider: Jonathan Fernandez MD  Encounter Date: 2023   Encounter department: [address]     1. Posadas's esophagus without dysplasia    2. Essential hypertension  -     Comprehensive metabolic panel; Future  -     TSH, 3rd generation with Free T4 reflex; Future    3. 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 Prostate cancer ([address])  -     PSA Total, Diagnostic; Future    5. Iron deficiency anemia due to chronic blood loss  -     ferrous sulfate 324 (65 Fe) mg; Take 1 tablet (324 mg total) by mouth every other day with breakfast  -     CBC and Platelet; Future    6. 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[address])    7. Encounter for immunization  -     influenza vaccine, high-dose, PF 0.7 mL (FLUZONE HIGH-DOSE)    8. Mixed hyperlipidemia  -     Lipid Panel with Direct LDL reflex; Future    9. Abnormal blood chemistry  -     HEMOGLOBIN A1C W/ EAG ESTIMATION; Future           Subjective     6 month follow up. No recent illness or injury    Had laceration L eyebrow, glued    Sinemet doing well for L hand tremor    Lost 5 pounds from decreased intake      Review of Systems   Constitutional:  Positive for unexpected weight change. HENT: Negative. Respiratory: Negative. Cardiovascular: Negative. Gastrointestinal: Negative. Genitourinary: Negative. Musculoskeletal: Negative. Psychiatric/Behavioral: Negative. All other systems reviewed and are negative. Past Medical History:   Diagnosis Date    Anxiety     Cancer [facility])     Depression     ED (erectile dysfunction)     GERD (gastroesophageal reflux disease)     OCD (obsessive compulsive disorder)     Parkinson disease      Past Surgical History:   Procedure Laterality Date    COLONOSCOPY  10/12/2017     unremarkable. A 10 year recall advised.     HERNIA REPAIR      TONSILECTOMY AND ADNOIDECTOMY       Family History   Problem Relation Age of Onset    Depression Father     Hypertension Mother     Colon cancer Neg Hx     Colon polyps Neg Hx      Social History     Socioeconomic History    Marital status: Single     Spouse name: None    Number of children: None    Years of education: None    Highest education level: None   Occupational History    None   Tobacco Use    Smoking status: Former    Smokeless tobacco: Never   Vaping Use    Vaping Use: Never used   Substance and Sexual Activity    Alcohol use: Yes     Comment: Rarely    Drug use: No    Sexual activity: None   Other Topics Concern    None   Social History Narrative    Always uses seat belt - Denied     Social Determinants of Health     Financial Resource Strain: Low Risk  (6/9/2023)    Overall Financial Resource Strain (CARDIA)     Difficulty of Paying Living Expenses: Not very hard   Food Insecurity: Not on file   Transportation Needs: No Transportation Needs (6/9/2023)    PRAPARE - Transportation     Lack of Transportation (Medical): No     Lack of Transportation (Non-Medical): No   Physical Activity: Not on file   Stress: Not on file   Social Connections: Not on file   Intimate Partner Violence: Not on file   Housing Stability: Not on file     Current Outpatient Medications on File Prior to Visit   Medication Sig    amLODIPine (NORVASC) 5 mg tablet TAKE 1 TABLET BY MOUTH TWICE A DAY    carbidopa-levodopa (SINEMET CR)  mg TBCR per ER tablet TAKE 1 TABLET BY MOUTH TWICE A DAY    FLUoxetine (PROzac) 20 mg capsule Take 1 capsule (20 mg total) by mouth daily    imipramine (TOFRANIL) 50 mg tablet Take 1 tablet (50 mg total) by mouth daily at bedtime    Linzess 290 MCG CAPS TAKE 1 CAPSULE BY MOUTH EVERY DAY    metoprolol succinate (TOPROL-XL) 50 mg 24 hr tablet TAKE 1 TABLET BY MOUTH EVERY DAY    omeprazole (PriLOSEC) 40 MG capsule TAKE 1 CAPSULE (40 MG TOTAL) BY MOUTH DAILY.     perphenazine 4 mg tablet Take 1 tablet (4 mg total) by mouth daily at bedtime rosuvastatin (CRESTOR) 10 MG tablet TAKE 1 TABLET BY MOUTH EVERY DAY    traZODone (DESYREL) 150 mg tablet Take 1 tablet (150 mg total) by mouth daily at bedtime as needed for sleep    zolpidem (AMBIEN) 10 mg tablet Take one tablet at bedtime as needed for sleep    [DISCONTINUED] ferrous sulfate 324 (65 Fe) mg TAKE 1 TABLET (324 MG TOTAL) BY MOUTH EVERY OTHER DAY WITH BREAKFAST     No Known Allergies  Immunization History   Administered Date(s) Administered    COVID-19 PFIZER VACCINE 0.3 ML IM 05/27/2021, 06/17/2021, 05/11/2022    INFLUENZA 12/04/2017, 12/06/2022    Influenza Split High Dose Preservative Free IM 12/02/2013, 12/01/2014, 11/30/2015, 09/07/2016, 12/04/2017    Influenza, high dose seasonal 0.7 mL 11/02/2018, 12/03/2019, 12/01/2020, 12/07/2021, 12/06/2022, 12/08/2023    Pneumococcal Conjugate 13-Valent 11/30/2015    Pneumococcal Polysaccharide PPV23 06/04/2014    Zoster 05/29/2013       Objective     /80 (BP Location: Left arm, Patient Position: Sitting, Cuff Size: Standard)   Pulse 75   Temp 97.7 °F (36.5 °C) (Tympanic)   Ht 5' 11" (1.803 m)   Wt 79.9 kg (176 lb 3.2 oz)   SpO2 97%   BMI 24.57 kg/m²     Physical Exam  Vitals reviewed. Constitutional:       Appearance: Normal appearance. Neck:      Vascular: No carotid bruit. Cardiovascular:      Rate and Rhythm: Normal rate and regular rhythm. Pulses: Normal pulses. Heart sounds: Normal heart sounds. Pulmonary:      Effort: Pulmonary effort is normal.      Breath sounds: Normal breath sounds. Musculoskeletal:      Right lower leg: No edema. Left lower leg: No edema. Lymphadenopathy:      Cervical: No cervical adenopathy. Neurological:      Mental Status: He is oriented to person, place, and time.    Psychiatric:         Mood and Affect: Mood normal.       Isabelle Garcia MD

## 2023-12-15 DIAGNOSIS — D50.0 IRON DEFICIENCY ANEMIA DUE TO CHRONIC BLOOD LOSS: ICD-10-CM

## 2023-12-15 RX ORDER — FERROUS SULFATE 324(65)MG
324 TABLET, DELAYED RELEASE (ENTERIC COATED) ORAL
Qty: 45 TABLET | Refills: 5 | Status: SHIPPED | OUTPATIENT
Start: 2023-12-15 | End: 2024-03-14

## 2023-12-23 DIAGNOSIS — F42.2 MIXED OBSESSIONAL THOUGHTS AND ACTS: ICD-10-CM

## 2024-02-16 DIAGNOSIS — K21.00 GASTROESOPHAGEAL REFLUX DISEASE WITH ESOPHAGITIS WITHOUT HEMORRHAGE: ICD-10-CM

## 2024-02-16 RX ORDER — OMEPRAZOLE 40 MG/1
40 CAPSULE, DELAYED RELEASE ORAL DAILY
Qty: 90 CAPSULE | Refills: 0 | Status: SHIPPED | OUTPATIENT
Start: 2024-02-16 | End: 2024-02-23

## 2024-02-23 ENCOUNTER — OFFICE VISIT (OUTPATIENT)
Dept: PSYCHIATRY | Facility: CLINIC | Age: 79
End: 2024-02-23
Payer: MEDICARE

## 2024-02-23 DIAGNOSIS — G21.19 PARKINSONISM DUE TO DRUGS (HCC): ICD-10-CM

## 2024-02-23 DIAGNOSIS — F42.2 MIXED OBSESSIONAL THOUGHTS AND ACTS: ICD-10-CM

## 2024-02-23 DIAGNOSIS — K58.1 IRRITABLE BOWEL SYNDROME WITH CONSTIPATION: ICD-10-CM

## 2024-02-23 DIAGNOSIS — F34.1 DYSTHYMIC DISORDER: ICD-10-CM

## 2024-02-23 DIAGNOSIS — K21.00 GASTROESOPHAGEAL REFLUX DISEASE WITH ESOPHAGITIS WITHOUT HEMORRHAGE: ICD-10-CM

## 2024-02-23 PROCEDURE — 99212 OFFICE O/P EST SF 10 MIN: CPT

## 2024-02-23 RX ORDER — ESZOPICLONE 1 MG/1
1 TABLET, FILM COATED ORAL
Qty: 30 TABLET | Refills: 2 | Status: SHIPPED | OUTPATIENT
Start: 2024-02-23

## 2024-02-23 RX ORDER — PERPHENAZINE 4 MG/1
4 TABLET ORAL
Qty: 90 TABLET | Refills: 0 | Status: SHIPPED | OUTPATIENT
Start: 2024-02-23

## 2024-02-23 RX ORDER — OMEPRAZOLE 40 MG/1
40 CAPSULE, DELAYED RELEASE ORAL DAILY
Qty: 90 CAPSULE | Refills: 1 | Status: SHIPPED | OUTPATIENT
Start: 2024-02-23

## 2024-02-23 RX ORDER — CARBIDOPA AND LEVODOPA 25; 100 MG/1; MG/1
TABLET, EXTENDED RELEASE ORAL
Qty: 180 TABLET | Refills: 2 | Status: SHIPPED | OUTPATIENT
Start: 2024-02-23

## 2024-02-23 RX ORDER — TRAZODONE HYDROCHLORIDE 150 MG/1
150 TABLET ORAL
Qty: 90 TABLET | Refills: 0 | Status: SHIPPED | OUTPATIENT
Start: 2024-02-23

## 2024-02-23 RX ORDER — IMIPRAMINE HCL 50 MG
50 TABLET ORAL
Qty: 90 TABLET | Refills: 0 | Status: SHIPPED | OUTPATIENT
Start: 2024-02-23

## 2024-02-23 RX ORDER — FLUOXETINE HYDROCHLORIDE 20 MG/1
20 CAPSULE ORAL DAILY
Qty: 90 CAPSULE | Refills: 0 | Status: SHIPPED | OUTPATIENT
Start: 2024-02-23

## 2024-02-23 RX ORDER — LINACLOTIDE 290 UG/1
CAPSULE, GELATIN COATED ORAL DAILY
Qty: 30 CAPSULE | Refills: 5 | Status: SHIPPED | OUTPATIENT
Start: 2024-02-23

## 2024-02-23 RX ORDER — FLUOXETINE HYDROCHLORIDE 20 MG/1
20 CAPSULE ORAL DAILY
Qty: 90 CAPSULE | Refills: 0 | OUTPATIENT
Start: 2024-02-23

## 2024-02-23 NOTE — PSYCH
Psychiatric Medication Management - Behavioral Health   Javier Ojeda 78 y.o. male MRN: [id number]        This note was not shared with the patient due to reasonable likelihood of causing patient harm    Reason for Visit: medication management    Subjective:   Patient is being treated for OCD and dysthymia. Patient is observed on Ambien 10 mg hs prn, Trazodone 150 mg hs prn, Perphenazine 4 mg hs, Imipramine 50 mg hs, Prozac 20 mg am. OCD symptoms since 14 yo. Patient tolerates the medications well, has been compliant and denies any side effects. He rates both depression and anxiety 4/10. No change since last appointment other than difficulty falling asleep. Patient reports ambien hasn't been working and has been up the past couple nights. He reports reduced OCD symptoms of handwashing every 2 hours and only washes hands before a meal. Appetite has been stable. Low energy, adequate motivation. Patient denies panic attacks, paranoia, ah/vh, elevated moods and si/hi.       Review Of Systems:     Constitutional Negative   ENT Negative   Cardiovascular HTN, Hyperlipidemia   Respiratory Negative   Gastrointestinal Negative   Genitourinary Hx of prostate cancer- in remission since 2017   Musculoskeletal Arthritis    Integumentary Negative   Neurological R- hand tremor- PCP aware   Endocrine Negative     Past Medical History:   Patient Active Problem List   Diagnosis    Posadas esophagus    Constipation    Degeneration, intervertebral disc, thoracic    Erectile dysfunction of non-organic origin    Esophagitis, reflux    Mixed hyperlipidemia    Lumbar radiculopathy    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HCC)    Osteopenia    Hemangioma of skin and subcutaneous tissue    Prostate cancer (HCC)    Increased frequency of urination    Venous stasis dermatitis of left lower extremity    Essential hypertension    Herpes simplex    SAPHO syndrome     Iron deficiency anemia due to chronic blood loss    Parkinsonism due to  drugs (HCC)    Speech articulation disorder    Light-headed feeling    Stasis ulcer of ankle, left (HCC)    Stage 3a chronic kidney disease (HCC)    Delusional disorder in remission (HCC)    Diarrhea    Parkinson disease       Allergies: No Known Allergies    Past Surgical History:   Past Surgical History:   Procedure Laterality Date    COLONOSCOPY  10/12/2017     unremarkable.  A 10 year recall advised.    HERNIA REPAIR      TONSILECTOMY AND ADNOIDECTOMY         Past Psychiatric History: JULIA Schmidt and JULIA Thomas at [facility]    Family Psychiatric History: pt denies     Social History: single, no children, lives by self    Substance Abuse History: pt denies     Traumatic History: hx of alcohol abuse, now drinks wine every few months    The following portions of the patient's history were reviewed and updated as appropriate: past family history, past medical history, past social history, past surgical history and problem list.    Objective:  There were no vitals filed for this visit.      Weight (last 2 days)       None            Mental status:  Appearance Casually dressed, thin   Mood Euthymic   Affect Mood congruent   Speech Soft, fluent, coherent    Thought Processes Linear and goal directed   Associations intact associations   Hallucinations Denies any auditory or visual hallucinations   Thought Content No passive or active suicidal or homicidal ideation, intent, or plan   Orientation Oriented to person, place, time, and situation   Recent and Remote Memory Grossly intact   Attention Span and Concentration Concentration intact   Intellect Appears to be of Average Intelligence   Insight Insight intact   Judgement judgment was intact   Muscle Strength Not tested, R hand tremor   Language Within normal limits   Fund of Knowledge Age appropriate   Pain NA     PHQ-A Depression Screening                Assessment/Plan:   1. STOP Ambien 10 mg hs prn  2. START Lunesta 1mg po hs   3. Continue  Trazodone 150 mg hs prn  4. Continue Perphenazine 4 mg hs  5. Continue Imipramine 50 mg hs  6. Continue Prozac 20 mg am  7. Call if any adverse medication side effects  8. Follow up in 3 months     Diagnosis: Dysthymia, OCD, Delusional Sung in remission    Risks, Benefits And Possible Side Effects Of Medications:  Risks, benefits, and possible side effects of medications explained to patient and family, they verbalize understanding    Controlled Medication Discussion: Discussed with patient Black Box warning on concurrent use of benzodiazepines and opioid medications including sedation, respiratory depression, coma and death. Patient understands the risk of treatment with benzodiazepines in addition to opioids and wants to continue taking those medications.      Psychotherapy Provided: Supportive psychotherapy provided.     Yes  Medication education provided to Javier.      Visit Time    Visit Start Time: 1:15 PM  Visit Stop Time: 1:30 PM  Total Visit Duration:  15 minutes

## 2024-02-27 ENCOUNTER — TELEPHONE (OUTPATIENT)
Dept: PSYCHIATRY | Facility: CLINIC | Age: 79
End: 2024-02-27

## 2024-02-27 NOTE — TELEPHONE ENCOUNTER
Pt stated that his insurance isn't covering thegeneric lunesta and it needs a prior auth    Processed, waiting for response from insurance       BIN- [id number]  [facility]  [facility]#- [id number]

## 2024-03-08 ENCOUNTER — DOCUMENTATION (OUTPATIENT)
Dept: PSYCHIATRY | Facility: CLINIC | Age: 79
End: 2024-03-08

## 2024-03-30 DIAGNOSIS — F42.2 MIXED OBSESSIONAL THOUGHTS AND ACTS: ICD-10-CM

## 2024-04-02 ENCOUNTER — TELEPHONE (OUTPATIENT)
Dept: PSYCHIATRY | Facility: CLINIC | Age: 79
End: 2024-04-02

## 2024-04-02 NOTE — TELEPHONE ENCOUNTER
Pt called, said that the generic lunesta is not really helping him sleep and he's asking if you'd increase it

## 2024-04-04 DIAGNOSIS — F34.1 DYSTHYMIC DISORDER: ICD-10-CM

## 2024-04-04 DIAGNOSIS — F42.2 MIXED OBSESSIONAL THOUGHTS AND ACTS: ICD-10-CM

## 2024-04-04 RX ORDER — ESZOPICLONE 2 MG/1
2 TABLET, FILM COATED ORAL
Qty: 30 TABLET | Refills: 1 | Status: SHIPPED | OUTPATIENT
Start: 2024-04-04

## 2024-05-02 DIAGNOSIS — F42.2 MIXED OBSESSIONAL THOUGHTS AND ACTS: ICD-10-CM

## 2024-05-02 DIAGNOSIS — F34.1 DYSTHYMIC DISORDER: ICD-10-CM

## 2024-05-09 PROBLEM — L97.329 STASIS ULCER OF ANKLE, LEFT (HCC): Status: RESOLVED | Noted: 2022-10-10 | Resolved: 2024-05-09

## 2024-05-09 PROBLEM — I83.023 STASIS ULCER OF ANKLE, LEFT (HCC): Status: RESOLVED | Noted: 2022-10-10 | Resolved: 2024-05-09

## 2024-05-09 PROBLEM — Z85.46 HISTORY OF PROSTATE CANCER: Status: ACTIVE | Noted: 2024-05-09

## 2024-05-09 PROBLEM — R19.7 DIARRHEA: Status: RESOLVED | Noted: 2023-04-18 | Resolved: 2024-05-09

## 2024-05-09 PROBLEM — I12.9 HYPERTENSIVE KIDNEY DISEASE WITH CKD (CHRONIC KIDNEY DISEASE): Status: ACTIVE | Noted: 2024-05-09

## 2024-05-21 ENCOUNTER — OFFICE VISIT (OUTPATIENT)
Dept: PSYCHIATRY | Facility: CLINIC | Age: 79
End: 2024-05-21
Payer: MEDICARE

## 2024-05-21 DIAGNOSIS — F22: ICD-10-CM

## 2024-05-21 DIAGNOSIS — F34.1 DYSTHYMIC DISORDER: ICD-10-CM

## 2024-05-21 DIAGNOSIS — F42.2 MIXED OBSESSIONAL THOUGHTS AND ACTS: ICD-10-CM

## 2024-05-21 PROCEDURE — 99212 OFFICE O/P EST SF 10 MIN: CPT

## 2024-05-21 RX ORDER — ZOLPIDEM TARTRATE 10 MG/1
TABLET ORAL
Qty: 30 TABLET | Refills: 2 | Status: SHIPPED | OUTPATIENT
Start: 2024-05-21

## 2024-05-21 RX ORDER — IMIPRAMINE HCL 50 MG
50 TABLET ORAL
Qty: 90 TABLET | Refills: 0 | Status: SHIPPED | OUTPATIENT
Start: 2024-05-21

## 2024-05-21 RX ORDER — FLUOXETINE HYDROCHLORIDE 20 MG/1
20 CAPSULE ORAL DAILY
Qty: 90 CAPSULE | Refills: 0 | Status: SHIPPED | OUTPATIENT
Start: 2024-05-21

## 2024-05-21 RX ORDER — TRAZODONE HYDROCHLORIDE 150 MG/1
150 TABLET ORAL
Qty: 90 TABLET | Refills: 0 | Status: SHIPPED | OUTPATIENT
Start: 2024-05-21

## 2024-05-21 RX ORDER — PERPHENAZINE 4 MG/1
4 TABLET ORAL
Qty: 90 TABLET | Refills: 0 | Status: SHIPPED | OUTPATIENT
Start: 2024-05-21

## 2024-05-21 NOTE — PSYCH
Psychiatric Medication Management - Behavioral Health   Javier Ojeda 78 y.o. male MRN: [id number]        This note was not shared with the patient due to reasonable likelihood of causing patient harm    Reason for Visit: medication management    Subjective:   Patient is being treated for OCD and dysthymia. Patient is observed on lunesta 2mg po hs prn, Trazodone 150 mg hs prn, Perphenazine 4 mg hs, Imipramine 50 mg hs, Prozac 20 mg am. OCD symptoms since 12 yo. Patient tolerates the medications well, has been compliant and denies any side effects. Patient reports his insurance wouldn't pay for lunesta, so he has been without medications for the past 3 months. Patient reports his sleep has suffered and had some nights with little sleep and others with no sleep. Otherwise, patient reports stable mood with minimal depression and anxiety. OCD symptoms remain reduced and only washes hands before meals and after using the bathroom. Appetite has been stable. Fair energy and motivation. Patient denies panic attacks, paranoia, ah/vh, elevated moods and si/hi. Patient will restart Ambien      Review Of Systems:     Constitutional Negative   ENT Negative   Cardiovascular HTN, Hyperlipidemia   Respiratory Negative   Gastrointestinal Negative   Genitourinary Hx of prostate cancer- in remission since 2017   Musculoskeletal Arthritis    Integumentary Negative   Neurological R- hand tremor- PCP aware   Endocrine Negative     Past Medical History:   Patient Active Problem List   Diagnosis    Posadas esophagus    Degeneration, intervertebral disc, thoracic    Erectile dysfunction of non-organic origin    Esophagitis, reflux    Mixed hyperlipidemia    Lumbar radiculopathy    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HCC)    Osteopenia    Hemangioma of skin and subcutaneous tissue    Increased frequency of urination    Essential hypertension    Herpes simplex    SAPHO syndrome  (HCC)    Iron deficiency anemia due to chronic blood  loss    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  A 10 year recall advised.    HERNIA REPAIR      TONSILECTOMY AND ADNOIDECTOMY         Past Psychiatric History: JULIA Schmidt and JULIA Thomas at [facility]    Family Psychiatric History: pt denies     Social History: single, no children, lives by self    Substance Abuse History: pt denies     Traumatic History: hx of alcohol abuse, now drinks wine every few months    The following portions of the patient's history were reviewed and updated as appropriate: past family history, past medical history, past social history, past surgical history and problem list.    Objective:  There were no vitals filed for this visit.      Weight (last 2 days)       None            Mental status:  Appearance Casually dressed, thin   Mood Euthymic   Affect Mood congruent   Speech Soft, fluent, coherent    Thought Processes Linear and goal directed   Associations intact associations   Hallucinations Denies any auditory or visual hallucinations   Thought Content No passive or active suicidal or homicidal ideation, intent, or plan   Orientation Oriented to person, place, time, and situation   Recent and Remote Memory Grossly intact   Attention Span and Concentration Concentration intact   Intellect Appears to be of Average Intelligence   Insight Insight intact   Judgement judgment was intact   Muscle Strength Not tested, R hand tremor   Language Within normal limits   Fund of Knowledge Age appropriate   Pain NA     PHQ-A Depression Screening                Assessment/Plan:    1. RESTART Ambien 10 mg hs prn  2. Continue Trazodone 150 mg hs prn  3. Continue Perphenazine 4 mg hs  4. Continue Imipramine 50 mg hs  5. Continue Prozac 20 mg am  6. Call if any adverse medication side effects  7. Follow up in 3 months     Diagnosis: Dysthymia, OCD, Delusional Sung in remission    Risks, Benefits And Possible Side Effects Of Medications:  Risks, benefits, and possible side effects of medications explained to patient and family, they verbalize understanding    Controlled Medication Discussion: Discussed with patient Black Box warning on concurrent use of benzodiazepines and opioid medications including sedation, respiratory depression, coma and death. Patient understands the risk of treatment with benzodiazepines in addition to opioids and wants to continue taking those medications.      Psychotherapy Provided: Supportive psychotherapy provided.     Yes  Medication education provided to Javier.      Visit Time    Visit Start Time: 1:20 PM  Visit Stop Time: 1:38 PM  Total Visit Duration:  18 minutes

## 2024-06-05 ENCOUNTER — RA CDI HCC (OUTPATIENT)
Dept: OTHER | Facility: HOSPITAL | Age: 79
End: 2024-06-05

## 2024-06-07 ENCOUNTER — APPOINTMENT (OUTPATIENT)
Dept: LAB | Facility: HOSPITAL | Age: 79
End: 2024-06-07
Payer: MEDICARE

## 2024-06-07 DIAGNOSIS — C61 PROSTATE CANCER (HCC): ICD-10-CM

## 2024-06-07 DIAGNOSIS — D50.0 IRON DEFICIENCY ANEMIA DUE TO CHRONIC BLOOD LOSS: ICD-10-CM

## 2024-06-07 DIAGNOSIS — I10 ESSENTIAL HYPERTENSION: ICD-10-CM

## 2024-06-07 DIAGNOSIS — E78.2 MIXED HYPERLIPIDEMIA: ICD-10-CM

## 2024-06-07 DIAGNOSIS — R79.9 ABNORMAL BLOOD CHEMISTRY: ICD-10-CM

## 2024-06-07 LAB
ALBUMIN SERPL BCP-MCNC: 4.3 G/DL (ref 3.5–5)
ALP SERPL-CCNC: 131 U/L (ref 34–104)
ALT SERPL W P-5'-P-CCNC: 10 U/L (ref 7–52)
ANION GAP SERPL CALCULATED.3IONS-SCNC: 12 MMOL/L (ref 4–13)
AST SERPL W P-5'-P-CCNC: 15 U/L (ref 13–39)
BILIRUB SERPL-MCNC: 0.46 MG/DL (ref 0.2–1)
BUN SERPL-MCNC: 16 MG/DL (ref 5–25)
CALCIUM SERPL-MCNC: 8.7 MG/DL (ref 8.4–10.2)
CHLORIDE SERPL-SCNC: 104 MMOL/L (ref 96–108)
CHOLEST SERPL-MCNC: 137 MG/DL
CO2 SERPL-SCNC: 24 MMOL/L (ref 21–32)
CREAT SERPL-MCNC: 1.54 MG/DL (ref 0.6–1.3)
ERYTHROCYTE [DISTWIDTH] IN BLOOD BY AUTOMATED COUNT: 13.1 % (ref 11.6–15.1)
EST. AVERAGE GLUCOSE BLD GHB EST-MCNC: 120 MG/DL
GFR SERPL CREATININE-BSD FRML MDRD: 42 ML/MIN/1.73SQ M
GLUCOSE P FAST SERPL-MCNC: 97 MG/DL (ref 65–99)
HBA1C MFR BLD: 5.8 %
HCT VFR BLD AUTO: 41.6 % (ref 36.5–49.3)
HDLC SERPL-MCNC: 35 MG/DL
HGB BLD-MCNC: 13.5 G/DL (ref 12–17)
LDLC SERPL CALC-MCNC: 79 MG/DL (ref 0–100)
MCH RBC QN AUTO: 28.2 PG (ref 26.8–34.3)
MCHC RBC AUTO-ENTMCNC: 32.5 G/DL (ref 31.4–37.4)
MCV RBC AUTO: 87 FL (ref 82–98)
PLATELET # BLD AUTO: 260 THOUSANDS/UL (ref 149–390)
PMV BLD AUTO: 11.8 FL (ref 8.9–12.7)
POTASSIUM SERPL-SCNC: 4.1 MMOL/L (ref 3.5–5.3)
PROT SERPL-MCNC: 7.6 G/DL (ref 6.4–8.4)
PSA SERPL-MCNC: 0.16 NG/ML (ref 0–4)
RBC # BLD AUTO: 4.79 MILLION/UL (ref 3.88–5.62)
SODIUM SERPL-SCNC: 140 MMOL/L (ref 135–147)
TRIGL SERPL-MCNC: 115 MG/DL
TSH SERPL DL<=0.05 MIU/L-ACNC: 2.26 UIU/ML (ref 0.45–4.5)
WBC # BLD AUTO: 10.43 THOUSAND/UL (ref 4.31–10.16)

## 2024-06-07 PROCEDURE — 83036 HEMOGLOBIN GLYCOSYLATED A1C: CPT

## 2024-06-07 PROCEDURE — 80053 COMPREHEN METABOLIC PANEL: CPT

## 2024-06-07 PROCEDURE — 36415 COLL VENOUS BLD VENIPUNCTURE: CPT

## 2024-06-07 PROCEDURE — 84153 ASSAY OF PSA TOTAL: CPT

## 2024-06-07 PROCEDURE — 80061 LIPID PANEL: CPT

## 2024-06-07 PROCEDURE — 84443 ASSAY THYROID STIM HORMONE: CPT

## 2024-06-07 PROCEDURE — 85027 COMPLETE CBC AUTOMATED: CPT

## 2024-06-11 ENCOUNTER — OFFICE VISIT (OUTPATIENT)
Dept: FAMILY MEDICINE CLINIC | Facility: HOSPITAL | Age: 79
End: 2024-06-11
Payer: MEDICARE

## 2024-06-11 VITALS
BODY MASS INDEX: 24.95 KG/M2 | OXYGEN SATURATION: 97 % | HEIGHT: 71 IN | TEMPERATURE: 96.6 F | DIASTOLIC BLOOD PRESSURE: 79 MMHG | SYSTOLIC BLOOD PRESSURE: 142 MMHG | WEIGHT: 178.2 LBS | HEART RATE: 69 BPM

## 2024-06-11 DIAGNOSIS — E78.2 MIXED HYPERLIPIDEMIA: ICD-10-CM

## 2024-06-11 DIAGNOSIS — N18.31 STAGE 3A CHRONIC KIDNEY DISEASE (HCC): ICD-10-CM

## 2024-06-11 DIAGNOSIS — Z85.46 HISTORY OF PROSTATE CANCER: ICD-10-CM

## 2024-06-11 DIAGNOSIS — M85.80 SAPHO SYNDROME  (HCC): ICD-10-CM

## 2024-06-11 DIAGNOSIS — L70.9 SAPHO SYNDROME  (HCC): ICD-10-CM

## 2024-06-11 DIAGNOSIS — D50.0 IRON DEFICIENCY ANEMIA DUE TO CHRONIC BLOOD LOSS: ICD-10-CM

## 2024-06-11 DIAGNOSIS — G21.19 PARKINSONISM DUE TO DRUGS (HCC): ICD-10-CM

## 2024-06-11 DIAGNOSIS — M65.9 SAPHO SYNDROME  (HCC): ICD-10-CM

## 2024-06-11 DIAGNOSIS — M86.9 SAPHO SYNDROME  (HCC): ICD-10-CM

## 2024-06-11 DIAGNOSIS — G20.A1 PARKINSON'S DISEASE WITHOUT DYSKINESIA OR FLUCTUATING MANIFESTATIONS: ICD-10-CM

## 2024-06-11 DIAGNOSIS — F60.5 OBSESSIVE COMPULSIVE PERSONALITY DISORDER (HCC): ICD-10-CM

## 2024-06-11 DIAGNOSIS — Z00.00 MEDICARE ANNUAL WELLNESS VISIT, SUBSEQUENT: Primary | ICD-10-CM

## 2024-06-11 DIAGNOSIS — L40.3 SAPHO SYNDROME  (HCC): ICD-10-CM

## 2024-06-11 DIAGNOSIS — I10 ESSENTIAL HYPERTENSION: ICD-10-CM

## 2024-06-11 PROCEDURE — G0439 PPPS, SUBSEQ VISIT: HCPCS | Performed by: FAMILY MEDICINE

## 2024-06-11 PROCEDURE — 99214 OFFICE O/P EST MOD 30 MIN: CPT | Performed by: FAMILY MEDICINE

## 2024-06-11 NOTE — PATIENT INSTRUCTIONS
Medicare Preventive Visit Patient Instructions  Thank you for completing your Welcome to Medicare Visit or Medicare Annual Wellness Visit today. Your next wellness visit will be due in one year (6/12/2025).  The screening/preventive services that you may require over the next 5-10 years are detailed below. Some tests may not apply to you based off risk factors and/or age. Screening tests ordered at today's visit but not completed yet may show as past due. Also, please note that scanned in results may not display below.  Preventive Screenings:  Service Recommendations Previous Testing/Comments   Colorectal Cancer Screening  Colonoscopy    Fecal Occult Blood Test (FOBT)/Fecal Immunochemical Test (FIT)  Fecal DNA/Cologuard Test  Flexible Sigmoidoscopy Age: 45-75 years old   Colonoscopy: every 10 years (May be performed more frequently if at higher risk)  OR  FOBT/FIT: every 1 year  OR  Cologuard: every 3 years  OR  Sigmoidoscopy: every 5 years  Screening may be recommended earlier than age 45 if at higher risk for colorectal cancer. Also, an individualized decision between you and your healthcare provider will decide whether screening between the ages of 76-85 would be appropriate. Colonoscopy: 07/20/2023  FOBT/FIT: Not on file  Cologuard: Not on file  Sigmoidoscopy: Not on file          Prostate Cancer Screening Individualized decision between patient and health care provider in men between ages of 55-69   Medicare will cover every 12 months beginning on the day after your 50th birthday PSA: 0.16 ng/mL     History Prostate Cancer  Screening Not Indicated     Hepatitis C Screening Once for adults born between 1945 and 1965  More frequently in patients at high risk for Hepatitis C Hep C Antibody: 11/23/2020    Screening Current   Diabetes Screening 1-2 times per year if you're at risk for diabetes or have pre-diabetes Fasting glucose: 97 mg/dL (6/7/2024)  A1C: 5.8 % (6/7/2024)  Screening Current   Cholesterol Screening  Once every 5 years if you don't have a lipid disorder. May order more often based on risk factors. Lipid panel: 06/07/2024  Screening Not Indicated  History Lipid Disorder      Other Preventive Screenings Covered by Medicare:  Abdominal Aortic Aneurysm (AAA) Screening: covered once if your at risk. You're considered to be at risk if you have a family history of AAA or a male between the age of 65-75 who smoking at least 100 cigarettes in your lifetime.  Lung Cancer Screening: covers low dose CT scan once per year if you meet all of the following conditions: (1) Age 55-77; (2) No signs or symptoms of lung cancer; (3) Current smoker or have quit smoking within the last 15 years; (4) You have a tobacco smoking history of at least 20 pack years (packs per day x number of years you smoked); (5) You get a written order from a healthcare provider.  Glaucoma Screening: covered annually if you're considered high risk: (1) You have diabetes OR (2) Family history of glaucoma OR (3)  aged 50 and older OR (4)  American aged 65 and older  Osteoporosis Screening: covered every 2 years if you meet one of the following conditions: (1) Have a vertebral abnormality; (2) On glucocorticoid therapy for more than 3 months; (3) Have primary hyperparathyroidism; (4) On osteoporosis medications and need to assess response to drug therapy.  HIV Screening: covered annually if you're between the age of 15-65. Also covered annually if you are younger than 15 and older than 65 with risk factors for HIV infection. For pregnant patients, it is covered up to 3 times per pregnancy.    Immunizations:  Immunization Recommendations   Influenza Vaccine Annual influenza vaccination during flu season is recommended for all persons aged >= 6 months who do not have contraindications   Pneumococcal Vaccine   * Pneumococcal conjugate vaccine = PCV13 (Prevnar 13), PCV15 (Vaxneuvance), PCV20 (Prevnar 20)  * Pneumococcal polysaccharide  vaccine = PPSV23 (Pneumovax) Adults 19-65 yo with certain risk factors or if 65+ yo  If never received any pneumonia vaccine: recommend Prevnar 20 (PCV20)  Give PCV20 if previously received 1 dose of PCV13 or PPSV23   Hepatitis B Vaccine 3 dose series if at intermediate or high risk (ex: diabetes, end stage renal disease, liver disease)   Respiratory syncytial virus (RSV) Vaccine - COVERED BY MEDICARE PART D  * RSVPreF3 (Arexvy) CDC recommends that adults 60 years of age and older may receive a single dose of RSV vaccine using shared clinical decision-making (SCDM)   Tetanus (Td) Vaccine - COST NOT COVERED BY MEDICARE PART B Following completion of primary series, a booster dose should be given every 10 years to maintain immunity against tetanus. Td may also be given as tetanus wound prophylaxis.   Tdap Vaccine - COST NOT COVERED BY MEDICARE PART B Recommended at least once for all adults. For pregnant patients, recommended with each pregnancy.   Shingles Vaccine (Shingrix) - COST NOT COVERED BY MEDICARE PART B  2 shot series recommended in those 19 years and older who have or will have weakened immune systems or those 50 years and older     Health Maintenance Due:      Topic Date Due   • Hepatitis C Screening  Completed   • Colorectal Cancer Screening  Discontinued     Immunizations Due:      Topic Date Due   • COVID-19 Vaccine (4 - 2023-24 season) 09/01/2023     Advance Directives   What are advance directives?  Advance directives are legal documents that state your wishes and plans for medical care. These plans are made ahead of time in case you lose your ability to make decisions for yourself. Advance directives can apply to any medical decision, such as the treatments you want, and if you want to donate organs.   What are the types of advance directives?  There are many types of advance directives, and each state has rules about how to use them. You may choose a combination of any of the following:  Living  will:  This is a written record of the treatment you want. You can also choose which treatments you do not want, which to limit, and which to stop at a certain time. This includes surgery, medicine, IV fluid, and tube feedings.   Durable power of  for healthcare (DPAHC):  This is a written record that states who you want to make healthcare choices for you when you are unable to make them for yourself. This person, called a proxy, is usually a family member or a friend. You may choose more than 1 proxy.  Do not resuscitate (DNR) order:  A DNR order is used in case your heart stops beating or you stop breathing. It is a request not to have certain forms of treatment, such as CPR. A DNR order may be included in other types of advance directives.  Medical directive:  This covers the care that you want if you are in a coma, near death, or unable to make decisions for yourself. You can list the treatments you want for each condition. Treatment may include pain medicine, surgery, blood transfusions, dialysis, IV or tube feedings, and a ventilator (breathing machine).  Values history:  This document has questions about your views, beliefs, and how you feel and think about life. This information can help others choose the care that you would choose.  Why are advance directives important?  An advance directive helps you control your care. Although spoken wishes may be used, it is better to have your wishes written down. Spoken wishes can be misunderstood, or not followed. Treatments may be given even if you do not want them. An advance directive may make it easier for your family to make difficult choices about your care.   Fall Prevention    Fall prevention  includes ways to make your home and other areas safer. It also includes ways you can move more carefully to prevent a fall. Health conditions that cause changes in your blood pressure, vision, or muscle strength and coordination may increase your risk for falls.  Medicines may also increase your risk for falls if they make you dizzy, weak, or sleepy.   Fall prevention tips:   Stand or sit up slowly.    Use assistive devices as directed.    Wear shoes that fit well and have soles that .    Wear a personal alarm.    Stay active.    Manage your medical conditions.    Home Safety Tips:  Add items to prevent falls in the bathroom.    Keep paths clear.    Install bright lights in your home.    Keep items you use often on shelves within reach.    Paint or place reflective tape on the edges of your stairs.       © Copyright [employer] 2018 Information is for End User's use only and may not be sold, redistributed or otherwise used for commercial purposes. All illustrations and images included in CareNotes® are the copyrighted property of [employer]M., Inc. or [employer]

## 2024-06-11 NOTE — PROGRESS NOTES
Ambulatory Visit  Name: Javier Ojeda      : 1945      MRN: [id number]  Encounter Provider: Ricardo Jordan MD  Encounter Date: 2024   Encounter department: [facility] [address]     Assessment & Plan   1. Medicare annual wellness visit, subsequent  2. Mixed hyperlipidemia  Assessment & Plan:  Good lipid profile  3. History of prostate cancer  4. Iron deficiency anemia due to chronic blood loss  Assessment & Plan:  Hgb back in normal range  5. 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HCC)  Assessment & Plan:  Follow up with [facility]  6. 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 Essential hypertension  Assessment & Plan:  Adequate BP control  9. 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 They screened negative with a PHQ-2 score of 0.    Falls Plan of Care: balance, strength, and gait training instructions were provided. Recommended assistive device to help with gait and balance. Medications that increase falls were reviewed. Assessed feet and footwear.       Preventive health issues were discussed with patient, and age appropriate screening tests were ordered as noted in patient's After Visit Summary. Personalized health advice and appropriate referrals for health education or preventive services given if needed, as noted in patient's After Visit Summary.    History of Present Illness     AWV and 6 month follow up medical issues    Fell in his kitchen, somewhat off balance    Follows with [facility] every three months    Labs reviewed in  detail and copy given       Patient Care Team:  Ricardo Jordan MD as PCP - General  Ricardo Jordan MD    Review of Systems   Constitutional: Negative.    HENT: Negative.     Cardiovascular: Negative.    Gastrointestinal: Negative.    Genitourinary: Negative.    Musculoskeletal:  Positive for arthralgias, gait problem and joint swelling.   Hematological: Negative.    Psychiatric/Behavioral:  Positive for dysphoric mood. The patient is nervous/anxious.    All other systems reviewed and are negative.    Medical History Reviewed by provider this encounter:  Tobacco  Allergies  Meds  Problems  Med Hx  Surg Hx  Fam Hx       Annual Wellness Visit Questionnaire   Javier is here for his Subsequent Wellness visit. Last Medicare Wellness visit information reviewed, patient interviewed and updates made to the record today.      Health Risk Assessment:   Patient rates overall health as good. Patient feels that their physical health rating is same. Patient is dissatisfied with their life. Eyesight was rated as same. Hearing was rated as same. Patient feels that their emotional and mental health rating is same. Patients states they are never, rarely angry. Patient states they are sometimes unusually tired/fatigued. Pain experienced in the last 7 days has been none. Patient states that he has experienced no weight loss or gain in last 6 months.     Depression Screening:   PHQ-2 Score: 0      Fall Risk Screening:   In the past year, patient has experienced: history of falling in past year    Number of falls: 2 or more  Injured during fall?: No    Feels unsteady when standing or walking?: No    Worried about falling?: No      Home Safety:  Patient does not have trouble with stairs inside or outside of their home. Patient has working smoke alarms and has working carbon monoxide detector. Home safety hazards include: none.     Nutrition:   Current diet is Regular.     Medications:   Patient is not currently taking any  over-the-counter supplements. Patient is able to manage medications.     Activities of Daily Living (ADLs)/Instrumental Activities of Daily Living (IADLs):   Walk and transfer into and out of bed and chair?: Yes  Dress and groom yourself?: Yes    Bathe or shower yourself?: Yes    Feed yourself? Yes  Do your laundry/housekeeping?: Yes  Manage your money, pay your bills and track your expenses?: Yes  Make your own meals?: Yes    Do your own shopping?: Yes    Previous Hospitalizations:   Any hospitalizations or ED visits within the last 12 months?: No      Advance Care Planning:   Living will: No    Durable POA for healthcare: Yes    Advanced directive: No    Advanced directive counseling given: Yes    Five wishes given: No    Patient declined ACP directive: No    End of Life Decisions reviewed with patient: Yes    Provider agrees with end of life decisions: Yes      Cognitive Screening:   Provider or family/friend/caregiver concerned regarding cognition?: No    PREVENTIVE SCREENINGS      Cardiovascular Screening:    General: Screening Not Indicated and History Lipid Disorder      Diabetes Screening:     General: Screening Current      Colorectal Cancer Screening:     General: Screening Current      Prostate Cancer Screening:    General: History Prostate Cancer and Screening Not Indicated      Osteoporosis Screening:    General: Screening Not Indicated      Abdominal Aortic Aneurysm (AAA) Screening:    Risk factors include: tobacco use        General: Screening Not Indicated      Lung Cancer Screening:     General: Screening Not Indicated      Hepatitis C Screening:    General: Screening Current    Screening, Brief Intervention, and Referral to Treatment (SBIRT)    Screening  Typical number of drinks in a day: 0  Typical number of drinks in a week: 0  Interpretation: Low risk drinking behavior.    Single Item Drug Screening:  How often have you used an illegal drug (including marijuana) or a prescription medication for  "non-medical reasons in the past year? never    Single Item Drug Screen Score: 0  Interpretation: Negative screen for possible drug use disorder    Social Determinants of Health     Financial Resource Strain: Low Risk  (6/9/2023)    Overall Financial Resource Strain (CARDIA)     Difficulty of Paying Living Expenses: Not very hard   Food Insecurity: No Food Insecurity (6/11/2024)    Hunger Vital Sign     Worried About Running Out of Food in the Last Year: Never true     Ran Out of Food in the Last Year: Never true   Transportation Needs: No Transportation Needs (6/11/2024)    PRAPARE - Transportation     Lack of Transportation (Medical): No     Lack of Transportation (Non-Medical): No   Housing Stability: Low Risk  (6/11/2024)    Housing Stability Vital Sign     Unable to Pay for Housing in the Last Year: No     Number of Times Moved in the Last Year: 1     Homeless in the Last Year: No   Utilities: Not At Risk (6/11/2024)    [facility] Utilities     Threatened with loss of utilities: No     No results found.    Objective     /79 (BP Location: Left arm, Patient Position: Sitting, Cuff Size: Standard)   Pulse 69   Temp (!) 96.6 °F (35.9 °C) (Tympanic)   Ht 5' 11\" (1.803 m)   Wt 80.8 kg (178 lb 3.2 oz)   SpO2 97%   BMI 24.85 kg/m²     Physical Exam  Vitals and nursing note reviewed.   Neck:      Vascular: No carotid bruit.   Cardiovascular:      Rate and Rhythm: Normal rate and regular rhythm.      Heart sounds: Normal heart sounds.   Pulmonary:      Effort: Pulmonary effort is normal.      Breath sounds: Normal breath sounds.   Musculoskeletal:      Right lower leg: No edema.      Left lower leg: No edema.   Skin:     Findings: No rash.   Neurological:      Mental Status: He is alert and oriented to person, place, and time.   Psychiatric:         Mood and Affect: Mood normal.       Administrative Statements           "

## 2024-06-12 NOTE — ASSESSMENT & PLAN NOTE
Lab Results   Component Value Date    EGFR 42 06/07/2024    EGFR 49 08/23/2023    EGFR 49 04/24/2023    CREATININE 1.54 (H) 06/07/2024    CREATININE 1.38 (H) 08/23/2023    CREATININE 1.38 (H) 04/24/2023

## 2024-06-28 RX ORDER — FLUOXETINE HYDROCHLORIDE 20 MG/1
20 CAPSULE ORAL DAILY
Qty: 90 CAPSULE | Refills: 0 | OUTPATIENT
Start: 2024-06-28

## 2024-07-08 DIAGNOSIS — F42.2 MIXED OBSESSIONAL THOUGHTS AND ACTS: ICD-10-CM

## 2024-07-11 PROBLEM — Z00.00 MEDICARE ANNUAL WELLNESS VISIT, SUBSEQUENT: Status: RESOLVED | Noted: 2018-06-04 | Resolved: 2024-07-11

## 2024-07-11 RX ORDER — FLUOXETINE HYDROCHLORIDE 20 MG/1
20 CAPSULE ORAL DAILY
Qty: 90 CAPSULE | Refills: 0 | Status: SHIPPED | OUTPATIENT
Start: 2024-07-11

## 2024-07-24 DIAGNOSIS — I10 ESSENTIAL HYPERTENSION: ICD-10-CM

## 2024-07-24 RX ORDER — AMLODIPINE BESYLATE 5 MG/1
TABLET ORAL
Qty: 180 TABLET | Refills: 1 | Status: SHIPPED | OUTPATIENT
Start: 2024-07-24

## 2024-07-31 RX ORDER — TRAZODONE HYDROCHLORIDE 150 MG/1
150 TABLET ORAL
Qty: 90 TABLET | Refills: 0 | OUTPATIENT
Start: 2024-07-31

## 2024-07-31 RX ORDER — PERPHENAZINE 4 MG/1
4 TABLET ORAL
Qty: 90 TABLET | Refills: 0 | OUTPATIENT
Start: 2024-07-31

## 2024-07-31 RX ORDER — IMIPRAMINE HCL 50 MG
50 TABLET ORAL
Qty: 90 TABLET | Refills: 0 | OUTPATIENT
Start: 2024-07-31

## 2024-08-10 DIAGNOSIS — F42.2 MIXED OBSESSIONAL THOUGHTS AND ACTS: ICD-10-CM

## 2024-08-10 DIAGNOSIS — F34.1 DYSTHYMIC DISORDER: ICD-10-CM

## 2024-08-11 RX ORDER — TRAZODONE HYDROCHLORIDE 150 MG/1
150 TABLET ORAL
Qty: 90 TABLET | Refills: 0 | Status: SHIPPED | OUTPATIENT
Start: 2024-08-11

## 2024-08-11 RX ORDER — IMIPRAMINE HCL 50 MG
50 TABLET ORAL
Qty: 90 TABLET | Refills: 0 | Status: SHIPPED | OUTPATIENT
Start: 2024-08-11

## 2024-08-13 RX ORDER — PERPHENAZINE 4 MG/1
4 TABLET ORAL
Qty: 90 TABLET | Refills: 0 | Status: SHIPPED | OUTPATIENT
Start: 2024-08-13

## 2024-08-16 DIAGNOSIS — K21.00 GASTROESOPHAGEAL REFLUX DISEASE WITH ESOPHAGITIS WITHOUT HEMORRHAGE: ICD-10-CM

## 2024-08-16 RX ORDER — OMEPRAZOLE 40 MG/1
40 CAPSULE, DELAYED RELEASE ORAL DAILY
Qty: 30 CAPSULE | Refills: 0 | Status: SHIPPED | OUTPATIENT
Start: 2024-08-16

## 2024-10-09 NOTE — PROGRESS NOTES
Psychiatric Discharge Summary     Admit Date: Several years ago  Discharge Date: 10/9/24    Discharge Diagnosis: Obsessive-compulsive disorder, Dysthymia    Treating Physician: JULIA Caceres      Presenting Problems/Pertinent Findings: Patient is being treated for OCD and dysthymia. Patient is observed on lunesta 2mg po hs prn, Trazodone 150 mg hs prn, Perphenazine 4 mg hs, Imipramine 50 mg hs, Prozac 20 mg am. OCD symptoms since 12 yo. Patient tolerates the medications well, has been compliant and denies any side effects. Patient reports his insurance wouldn't pay for lunesta, so he has been without medications for the past 3 months. Patient reports his sleep has suffered and had some nights with little sleep and others with no sleep. Otherwise, patient reports stable mood with minimal depression and anxiety. OCD symptoms remain reduced and only washes hands before meals and after using the bathroom. Appetite has been stable. Fair energy and motivation. Patient denies panic attacks, paranoia, ah/vh, elevated moods and si/hi. [facility]          Course of Treatment:  Patient was in treatment with this provider 23-24.        Subsequently, the patient withdrew from treatment.    Criteria for Discharge:      Aftercare Recommendations:  N/A    Discharge Medications:   Current Outpatient Medications:     ferrous sulfate 324 (65 Fe) mg, TAKE 1 TABLET (324 MG TOTAL) BY MOUTH EVERY OTHER DAY WITH BREAKFAST, Disp: 45 tablet, Rfl: 5       Mental Status at Time of most recent visit on 24.     Mental status:  Appearance Casually dressed, thin   Mood Euthymic   Affect Mood congruent   Speech Soft, fluent, coherent    Thought Processes Linear and goal directed   Associations intact associations   Hallucinations Denies any auditory or visual hallucinations   Thought Content No passive or active suicidal or homicidal ideation, intent, or plan   Orientation Oriented to person, place, time,  and situation   Recent and Remote Memory Grossly intact   Attention Span and Concentration Concentration intact   Intellect Appears to be of Average Intelligence   Insight Insight intact   Judgement judgment was intact   Muscle Strength Not tested, R hand tremor   Language Within normal limits   Fund of Knowledge Age appropriate   Pain NA

## 2025-02-07 NOTE — PROGRESS NOTES
[id number] - Outpatient Follow-up Note  Marquis Loza 69 y o  male MRN: [id number]  Encounter: [id number]    ASSESSMENT AND PLAN:      1  Slow transit constipation  2  Irritable bowel syndrome,  constipation predominant   Chronic issues with constipation  Suspect functional   Side effect of antidepressant medication likely contributing  He is doing well on daily Linzess and MiraLax as needed  Continue to increase fluid and fiber  Daily Fiber supplementation  Linzess 290mg daily  Miralax as needed  LinaCLOtide (Linzess) 290 MCG CAPS; Take 1 capsule by mouth daily  Dispense: 30 capsule; Refill: 11    2  Encounter for screening colonoscopy    Up-to-date with screening colonoscopy  Last colonoscopy was in July of 2017 and showed the absence of adenomatous colon polyps  A 10 year recall advised  Followup Appointment: 2 years ______________________________________________________________________    Chief Complaint   Patient presents with    Medication Refill     Linzess 290 mcg daily     HPI:      Returns to the office for a refill of his medication Linzess that has been extremely helpful in alleviating longstanding issues with constipation  Having regular bowel movements  On daily Linzess  The kg will need to take MiraLax a few times a month  He is trying to increase fluid and fiber in his diet  Having a bowel movement at least every other day  Denies any abdominal pain, abdominal bloating, bright red blood per rectum, melena, nausea or vomiting  Appetite is good and weight has remained stable  Denies any upper GI complaints to include dysphagia, odynophagia, early satiety or heartburn      Historical Information   Past Medical History:   Diagnosis Date    Anxiety     Cancer ([facility] [address] )     Depression     ED (erectile dysfunction)     GERD (gastroesophageal reflux disease)     OCD (obsessive compulsive disorder)      Past Surgical History:   Procedure Laterality Pt is scheduled with PCP for 2/11/25.   Date    COLONOSCOPY  10/12/2017     unremarkable  A 10 year recall advised   HERNIA REPAIR      TONSILECTOMY AND ADNOIDECTOMY       Social History     Substance and Sexual Activity   Alcohol Use Yes    Comment: Rarely     Social History     Substance and Sexual Activity   Drug Use No     Social History     Tobacco Use   Smoking Status Former Smoker   Smokeless Tobacco Never Used     Family History   Problem Relation Age of Onset    Depression Father     Hypertension Mother     Colon cancer Neg Hx     Colon polyps Neg Hx          Current Outpatient Medications:     amLODIPine (NORVASC) 5 mg tablet    bicalutamide (CASODEX) 50 mg tablet    famotidine (PEPCID) 20 mg tablet    ferrous sulfate 324 (65 Fe) mg    FLUoxetine (PROzac) 20 mg capsule    imipramine (TOFRANIL) 50 mg tablet    linaCLOtide (Linzess) 290 MCG CAPS    neomycin-polymyxin-hydrocortisone (CORTISPORIN) otic solution    perphenazine 4 mg tablet    sildenafil (VIAGRA) 100 mg tablet    traZODone (DESYREL) 150 mg tablet    valACYclovir (VALTREX) 500 mg tablet    zolpidem (AMBIEN) 10 mg tablet  No Known Allergies  Reviewed medications and allergies and updated as indicated    PHYSICAL EXAM:    Blood pressure 144/68, pulse 89, temperature 97 5 °F (36 4 °C), height 5' 9 25" (1 759 m), weight 73 5 kg (162 lb)  Body mass index is 23 75 kg/m²  General Appearance: NAD, cooperative, alert  Eyes: Anicteric  ENT:  Normocephalic  Neck: Appears normal  Resp:  Clear to auscultation bilaterally; no rales, rhonchi or wheezing; respirations unlabored   CV:  S1 S2, Regular rate and rhythm; no murmur, rub, or gallop    GI:  Soft, non-tender, non-distended; normal bowel sounds; no masses, no organomegaly   Rectal: Deferred  Skin:  No jaundice, rashes, or lesions   Psych: Normal affect, good eye contact  Neuro: No gross deficits, AAOx3    Lab Results:   Lab Results   Component Value Date    WBC 7 42 08/03/2020    HGB 12 6 08/03/2020    HCT 39 8 08/03/2020    MCV 88 08/03/2020     08/03/2020     Lab Results   Component Value Date     11/24/2015    K 4 0 05/26/2020     05/26/2020    CO2 27 05/26/2020    ANIONGAP 1 (L) 11/24/2015    BUN 16 05/26/2020    CREATININE 1 23 05/26/2020    GLUCOSE 81 11/24/2015    GLUF 89 05/26/2020    CALCIUM 8 5 05/26/2020    AST 14 05/26/2020    ALT 23 05/26/2020    ALKPHOS 114 05/26/2020    PROT 7 2 11/24/2015    BILITOT 0 67 11/24/2015    EGFR 58 05/26/2020     Lab Results   Component Value Date    FERRITIN 13 08/03/2020     No results found for: LIPASE    Radiology Results:   No results found